# Patient Record
Sex: FEMALE | Race: WHITE | NOT HISPANIC OR LATINO | Employment: FULL TIME | URBAN - METROPOLITAN AREA
[De-identification: names, ages, dates, MRNs, and addresses within clinical notes are randomized per-mention and may not be internally consistent; named-entity substitution may affect disease eponyms.]

---

## 2020-11-03 ASSESSMENT — ENCOUNTER SYMPTOMS
ARTHRALGIAS: 0
PARESTHESIAS: 0
HEARTBURN: 0
CONSTIPATION: 0
JOINT SWELLING: 0
COUGH: 0
SORE THROAT: 0
NAUSEA: 0
SHORTNESS OF BREATH: 0
PALPITATIONS: 0
DIARRHEA: 0
HEMATOCHEZIA: 0
WEAKNESS: 0
BREAST MASS: 0
NERVOUS/ANXIOUS: 1
DYSURIA: 0
HEADACHES: 1
DIZZINESS: 0
HEMATURIA: 0
CHILLS: 0
MYALGIAS: 0
FEVER: 0
ABDOMINAL PAIN: 0
EYE PAIN: 0
FREQUENCY: 0

## 2020-11-03 NOTE — PATIENT INSTRUCTIONS
Labs today  Follow up with dermatology   Call OB-GYN to schedule   Follow up with GI    6 weeks of Whole 30  DGL twice daily  Aloe vera once daily  Add back gluten first and see if you have symptoms  If you do, I would advise strictly staying away from it  If confusing, see GI.  Return to clinic for any new or worsening symptoms or go to ER Urgent care in off hours    Preventive Health Recommendations  Female Ages 21 to 25     Yearly exam:     See your health care provider every year in order to  o Review health changes.   o Discuss preventive care.    o Review your medicines if your doctor has prescribed any.      You should be tested each year for STDs (sexually transmitted diseases).       Talk to your provider about how often you should have cholesterol testing.      Get a Pap test every three years. If you have an abnormal result, your doctor may have you test more often.      If you are at risk for diabetes, you should have a diabetes test (fasting glucose).     Shots:     Get a flu shot each year.     Get a tetanus shot every 10 years.     Consider getting the shot (vaccine) that prevents cervical cancer (Gardasil).    Nutrition:     Eat at least 5 servings of fruits and vegetables each day.    Eat whole-grain bread, whole-wheat pasta and brown rice instead of white grains and rice.    Get adequate Calcium and Vitamin D.     Lifestyle    Exercise at least 150 minutes a week each week (30 minutes a day, 5 days a week). This will help you control your weight and prevent disease.    Limit alcohol to one drink per day.    No smoking.     Wear sunscreen to prevent skin cancer.    See your dentist every six months for an exam and cleaning.

## 2020-11-03 NOTE — PROGRESS NOTES
"   SUBJECTIVE:   CC: Litzy Hatch is an 20 year old woman who presents for preventive health visit.       Patient has been advised of split billing requirements and indicates understanding: Yes  Healthy Habits:     Getting at least 3 servings of Calcium per day:  Yes    Bi-annual eye exam:  Yes    Dental care twice a year:  Yes    Sleep apnea or symptoms of sleep apnea:  None    Diet:  Gluten-free/reduced    Frequency of exercise:  2-3 days/week    Duration of exercise:  15-30 minutes    Taking medications regularly:  Yes    Medication side effects:  None    PHQ-2 Total Score: 0    Additional concerns today:  Yes    Needs referral to derm for acne  Currently on tretinoin and clindamycin cream    Also wonders about if she has a septate hymen   When she got her period, she realized she had 2 holes    Wondering about gluten intolerance  All of her siblings have had allergy testing lately, and they have come back with some \"Weird\" allergies  She lived in [place] 6 months and she had some difficulty adjusting back to medications. She eventually was able to get back on dairy but gluten has been causing rashes.   Has had some unintentional weight loss up to 10 pounds intermittently    Taking a high dose iron supplement, ferix 120 every other day      Today's PHQ-2 Score:   PHQ-2 ( 1999 Pfizer) 11/4/2020   Q1: Little interest or pleasure in doing things 0   Q2: Feeling down, depressed or hopeless 0   PHQ-2 Score 0   Q1: Little interest or pleasure in doing things -   Q2: Feeling down, depressed or hopeless -   PHQ-2 Score -       Abuse: Current or Past (Physical, Sexual or Emotional) - No  Do you feel safe in your environment? Yes        Social History     Tobacco Use     Smoking status: Never Smoker     Smokeless tobacco: Never Used   Substance Use Topics     Alcohol use: Yes     Comment: occassionally      If you drink alcohol do you typically have >3 drinks per day or >7 drinks per week? No    No " flowsheet data found.    Reviewed orders with patient.  Reviewed health maintenance and updated orders accordingly - Yes  Lab work is in process  Labs reviewed in EPIC  BP Readings from Last 3 Encounters:   11/04/20 116/58    Wt Readings from Last 3 Encounters:   11/04/20 60.1 kg (132 lb 8 oz)                  There is no problem list on file for this patient.    History reviewed. No pertinent surgical history.    Social History     Tobacco Use     Smoking status: Never Smoker     Smokeless tobacco: Never Used   Substance Use Topics     Alcohol use: Yes     Comment: occassionally      History reviewed. No pertinent family history.      No current outpatient medications on file.       Mammogram not appropriate for this patient based on age.    Pertinent mammograms are reviewed under the imaging tab.  History of abnormal Pap smear: NO - age 21-29 PAP every 3 years recommended     Reviewed and updated as needed this visit by clinical staff  Tobacco  Allergies  Meds   Med Hx  Surg Hx  Fam Hx  Soc Hx        Reviewed and updated as needed this visit by Provider                    Review of Systems   Constitutional: Negative for chills and fever.   HENT: Negative for congestion, ear pain, hearing loss and sore throat.    Eyes: Negative for pain and visual disturbance.   Respiratory: Negative for cough and shortness of breath.    Cardiovascular: Negative for chest pain, palpitations and peripheral edema.   Gastrointestinal: Negative for abdominal pain, constipation, diarrhea, heartburn, hematochezia and nausea.   Breasts:  Negative for tenderness, breast mass and discharge.   Genitourinary: Negative for dysuria, frequency, genital sores, hematuria, pelvic pain, urgency, vaginal bleeding and vaginal discharge.   Musculoskeletal: Negative for arthralgias, joint swelling and myalgias.   Skin: Negative for rash.   Neurological: Positive for headaches. Negative for dizziness, weakness and paresthesias.  "  Psychiatric/Behavioral: Negative for mood changes. The patient is nervous/anxious.           OBJECTIVE:   /58   Pulse 100   Temp 98.1  F (36.7  C) (Temporal)   Ht 1.765 m (5' 9.49\")   Wt 60.1 kg (132 lb 8 oz)   LMP 10/23/2020 (Exact Date)   SpO2 99%   BMI 19.29 kg/m    Physical Exam  GENERAL: healthy, alert and no distress  EYES: Eyes grossly normal to inspection, PERRL and conjunctivae and sclerae normal  HENT: ear canals and TM's normal, nose and mouth without ulcers or lesions  NECK: no adenopathy, no asymmetry, masses, or scars and thyroid normal to palpation  RESP: lungs clear to auscultation - no rales, rhonchi or wheezes  BREAST: normal without masses, tenderness or nipple discharge and no palpable axillary masses or adenopathy  CV: regular rate and rhythm, normal S1 S2, no S3 or S4, no murmur, click or rub, no peripheral edema and peripheral pulses strong  ABDOMEN: soft, nontender, no hepatosplenomegaly, no masses and bowel sounds normal   (female): an approximately 1 cm x 0.5 cm fibrous tissue at entrance of introitus to the left. otherwise normal female external genitalia, normal urethral meatus, vaginal mucosa pink, moist, well rugated, and normal cervix/adnexa/uterus without masses or discharge  MS: no gross musculoskeletal defects noted, no edema  SKIN: no suspicious lesions or rashes  NEURO: Normal strength and tone, mentation intact and speech normal  PSYCH: mentation appears normal, affect normal/bright    Diagnostic Test Results:  Labs reviewed in Epic    ASSESSMENT/PLAN:       ICD-10-CM    1. Routine general medical examination at a health care facility  Z00.00    2. Acne, unspecified acne type  L70.9 DERMATOLOGY ADULT REFERRAL   3. Screening for malignant neoplasm of cervix  Z12.4 PAP imaged thin layer screen only - recommended age 21 - 24 years   4. Screen for STD (sexually transmitted disease)  Z11.3    5. Abdominal pain, generalized  R10.84 GASTROENTEROLOGY ADULT REF CONSULT " ONLY     TSH with free T4 reflex     Tissue transglutaminase fermín IgA and IgG     Iron and iron binding capacity     CBC with platelets and differential   6. 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  Return to clinic for any new or worsening symptoms or go to ER Urgent care in off hours    Preventive Health Recommendations  Female Ages 21 to 25     Yearly exam:     See your health care provider every year in order to  o Review health changes.   o Discuss preventive care.    o Review your medicines if your doctor has prescribed any.      You should be tested each year for STDs (sexually transmitted diseases).       Talk to your provider about how often you should have cholesterol testing.      Get a Pap test every three years. If you have an abnormal result, your doctor may have you test more often.      If you are at risk for diabetes, you should have a diabetes test (fasting glucose).     Shots:     Get a flu shot each year.     Get a tetanus shot every 10 years.     Consider getting the shot (vaccine) that prevents cervical cancer (Gardasil).    Nutrition:     Eat at least 5 servings of fruits and vegetables each day.    Eat whole-grain bread, whole-wheat pasta and brown rice instead of white grains and rice.    Get adequate Calcium and Vitamin D.     Lifestyle    Exercise at least 150 minutes a week each week (30 minutes a day, 5 days a week). This will help you control your weight and prevent disease.    Limit alcohol to one drink per day.    No smoking.     Wear sunscreen to prevent skin cancer.    See your dentist every six months for an exam and  "cleaning.        Patient has been advised of split billing requirements and indicates understanding: Yes  COUNSELING:  Reviewed preventive health counseling, as reflected in patient instructions    Estimated body mass index is 19.29 kg/m  as calculated from the following:    Height as of this encounter: 1.765 m (5' 9.49\").    Weight as of this encounter: 60.1 kg (132 lb 8 oz).        She reports that she has never smoked. She has never used smokeless tobacco.      Counseling Resources:  ATP IV Guidelines  Pooled Cohorts Equation Calculator  Breast Cancer Risk Calculator  BRCA-Related Cancer Risk Assessment: FHS-7 Tool  FRAX Risk Assessment  ICSI Preventive Guidelines  Dietary Guidelines for Americans, 2010  USDA's MyPlate  ASA Prophylaxis  Lung CA Screening    Evelina Thacker PA-C  [facility]  "

## 2020-11-04 ENCOUNTER — OFFICE VISIT (OUTPATIENT)
Dept: FAMILY MEDICINE | Facility: CLINIC | Age: 20
End: 2020-11-04
Payer: COMMERCIAL

## 2020-11-04 VITALS
WEIGHT: 132.5 LBS | OXYGEN SATURATION: 99 % | TEMPERATURE: 98.1 F | BODY MASS INDEX: 19.62 KG/M2 | HEIGHT: 69 IN | SYSTOLIC BLOOD PRESSURE: 116 MMHG | DIASTOLIC BLOOD PRESSURE: 58 MMHG | HEART RATE: 100 BPM

## 2020-11-04 DIAGNOSIS — Z00.00 ROUTINE GENERAL MEDICAL EXAMINATION AT A HEALTH CARE FACILITY: Primary | ICD-10-CM

## 2020-11-04 DIAGNOSIS — L50.9 URTICARIA: ICD-10-CM

## 2020-11-04 DIAGNOSIS — R79.89 ELEVATED TSH: ICD-10-CM

## 2020-11-04 DIAGNOSIS — L70.9 ACNE, UNSPECIFIED ACNE TYPE: ICD-10-CM

## 2020-11-04 DIAGNOSIS — Q52.4 SEPTATE HYMEN: ICD-10-CM

## 2020-11-04 DIAGNOSIS — R10.84 ABDOMINAL PAIN, GENERALIZED: ICD-10-CM

## 2020-11-04 DIAGNOSIS — Z12.4 SCREENING FOR MALIGNANT NEOPLASM OF CERVIX: ICD-10-CM

## 2020-11-04 DIAGNOSIS — Z11.3 SCREEN FOR STD (SEXUALLY TRANSMITTED DISEASE): ICD-10-CM

## 2020-11-04 LAB
BASOPHILS # BLD AUTO: 0 10E9/L (ref 0–0.2)
BASOPHILS NFR BLD AUTO: 0.6 %
DIFFERENTIAL METHOD BLD: NORMAL
EOSINOPHIL # BLD AUTO: 0.1 10E9/L (ref 0–0.7)
EOSINOPHIL NFR BLD AUTO: 2.6 %
ERYTHROCYTE [DISTWIDTH] IN BLOOD BY AUTOMATED COUNT: 13.3 % (ref 10–15)
HCT VFR BLD AUTO: 38.8 % (ref 35–47)
HGB BLD-MCNC: 12.7 G/DL (ref 11.7–15.7)
IRON SATN MFR SERPL: 30 % (ref 15–46)
IRON SERPL-MCNC: 99 UG/DL (ref 35–180)
LYMPHOCYTES # BLD AUTO: 2 10E9/L (ref 0.8–5.3)
LYMPHOCYTES NFR BLD AUTO: 43.3 %
MCH RBC QN AUTO: 29.6 PG (ref 26.5–33)
MCHC RBC AUTO-ENTMCNC: 32.7 G/DL (ref 31.5–36.5)
MCV RBC AUTO: 90 FL (ref 78–100)
MONOCYTES # BLD AUTO: 0.4 10E9/L (ref 0–1.3)
MONOCYTES NFR BLD AUTO: 8.8 %
NEUTROPHILS # BLD AUTO: 2.1 10E9/L (ref 1.6–8.3)
NEUTROPHILS NFR BLD AUTO: 44.7 %
PLATELET # BLD AUTO: 340 10E9/L (ref 150–450)
RBC # BLD AUTO: 4.29 10E12/L (ref 3.8–5.2)
TIBC SERPL-MCNC: 329 UG/DL (ref 240–430)
WBC # BLD AUTO: 4.7 10E9/L (ref 4–11)

## 2020-11-04 PROCEDURE — 83540 ASSAY OF IRON: CPT | Performed by: PHYSICIAN ASSISTANT

## 2020-11-04 PROCEDURE — 84443 ASSAY THYROID STIM HORMONE: CPT | Performed by: PHYSICIAN ASSISTANT

## 2020-11-04 PROCEDURE — G0145 SCR C/V CYTO,THINLAYER,RESCR: HCPCS | Performed by: PHYSICIAN ASSISTANT

## 2020-11-04 PROCEDURE — 84439 ASSAY OF FREE THYROXINE: CPT | Performed by: PHYSICIAN ASSISTANT

## 2020-11-04 PROCEDURE — 85025 COMPLETE CBC W/AUTO DIFF WBC: CPT | Performed by: PHYSICIAN ASSISTANT

## 2020-11-04 PROCEDURE — 36415 COLL VENOUS BLD VENIPUNCTURE: CPT | Performed by: PHYSICIAN ASSISTANT

## 2020-11-04 PROCEDURE — 83516 IMMUNOASSAY NONANTIBODY: CPT | Performed by: PHYSICIAN ASSISTANT

## 2020-11-04 PROCEDURE — 99385 PREV VISIT NEW AGE 18-39: CPT | Performed by: PHYSICIAN ASSISTANT

## 2020-11-04 PROCEDURE — 99214 OFFICE O/P EST MOD 30 MIN: CPT | Mod: 25 | Performed by: PHYSICIAN ASSISTANT

## 2020-11-04 PROCEDURE — 83550 IRON BINDING TEST: CPT | Performed by: PHYSICIAN ASSISTANT

## 2020-11-04 ASSESSMENT — ENCOUNTER SYMPTOMS
FEVER: 0
DIZZINESS: 0
FREQUENCY: 0
PARESTHESIAS: 0
SHORTNESS OF BREATH: 0
ARTHRALGIAS: 0
EYE PAIN: 0
ABDOMINAL PAIN: 0
MYALGIAS: 0
PALPITATIONS: 0
CONSTIPATION: 0
HEADACHES: 1
JOINT SWELLING: 0
HEARTBURN: 0
CHILLS: 0
NERVOUS/ANXIOUS: 1
COUGH: 0
SORE THROAT: 0
HEMATOCHEZIA: 0
WEAKNESS: 0
NAUSEA: 0
DYSURIA: 0
HEMATURIA: 0
BREAST MASS: 0
DIARRHEA: 0

## 2020-11-04 ASSESSMENT — MIFFLIN-ST. JEOR: SCORE: 1433.14

## 2020-11-05 LAB
T4 FREE SERPL-MCNC: 0.94 NG/DL (ref 0.76–1.46)
TSH SERPL DL<=0.005 MIU/L-ACNC: 5.4 MU/L (ref 0.4–4)
TTG IGA SER-ACNC: <1 U/ML
TTG IGG SER-ACNC: <1 U/ML

## 2020-11-05 NOTE — TELEPHONE ENCOUNTER
REFERRAL INFORMATION:    Referring Provider:  Dr. Thacker    Referring Clinic:  [facility]     Reason for Visit/Diagnosis: Abdominal Pain      FUTURE VISIT INFORMATION:    Appointment Date: 12/9/2020    Appointment Time: 2 PM      NOTES STATUS DETAILS   OFFICE NOTE from Referring Provider Internal 11/4/2020 Office visit with Dr. Thacker    OFFICE NOTE from Other Specialist N/A    HOSPITAL DISCHARGE SUMMARY/  ED VISITS N/A    OPERATIVE REPORT N/A    MEDICATION LIST N/A         ENDOSCOPY  N/A    COLONOSCOPY N/A    ERCP N/A    EUS N/A    STOOL TESTING N/A    PERTINENT LABS Internal     PATHOLOGY REPORTS (RELATED) N/A    IMAGING (CT, MRI, EGD, MRCP, Small Bowel Follow Through/SBT, MR/CT Enterography) N/A

## 2020-11-10 LAB
COPATH REPORT: NORMAL
PAP: NORMAL

## 2020-11-27 ENCOUNTER — MYC MEDICAL ADVICE (OUTPATIENT)
Dept: FAMILY MEDICINE | Facility: CLINIC | Age: 20
End: 2020-11-27

## 2020-11-27 DIAGNOSIS — F41.9 ANXIETY: Primary | ICD-10-CM

## 2020-11-27 NOTE — TELEPHONE ENCOUNTER
Beverly,    See patients my chart message. Please advise    Pharm cued. zofran cued for 8 mg #30 with 1 refill  Ativan cued for 1 mg #15 with 1 refill  Thanks  Viola Cornell RN   [facility]

## 2020-12-01 RX ORDER — LORAZEPAM 1 MG/1
1 TABLET ORAL EVERY 6 HOURS PRN
Qty: 10 TABLET | Refills: 0 | Status: SHIPPED | OUTPATIENT
Start: 2020-12-01 | End: 2021-06-01

## 2020-12-01 RX ORDER — ONDANSETRON 8 MG/1
8 TABLET, FILM COATED ORAL EVERY 8 HOURS PRN
Qty: 10 TABLET | Refills: 0 | Status: SHIPPED | OUTPATIENT
Start: 2020-12-01 | End: 2021-06-01

## 2020-12-09 ENCOUNTER — VIRTUAL VISIT (OUTPATIENT)
Dept: GASTROENTEROLOGY | Facility: CLINIC | Age: 20
End: 2020-12-09
Payer: COMMERCIAL

## 2020-12-09 ENCOUNTER — PRE VISIT (OUTPATIENT)
Dept: GASTROENTEROLOGY | Facility: CLINIC | Age: 20
End: 2020-12-09

## 2020-12-09 VITALS — WEIGHT: 135 LBS | HEIGHT: 69 IN | BODY MASS INDEX: 19.99 KG/M2

## 2020-12-09 DIAGNOSIS — R10.84 ABDOMINAL PAIN, GENERALIZED: ICD-10-CM

## 2020-12-09 DIAGNOSIS — R11.0 NAUSEA: Primary | ICD-10-CM

## 2020-12-09 DIAGNOSIS — L50.9 URTICARIA: ICD-10-CM

## 2020-12-09 PROCEDURE — 99204 OFFICE O/P NEW MOD 45 MIN: CPT | Mod: 95 | Performed by: INTERNAL MEDICINE

## 2020-12-09 ASSESSMENT — MIFFLIN-ST. JEOR: SCORE: 1444.51

## 2020-12-09 NOTE — LETTER
"  12/9/2020       RE: Litzy Hatch  [address]  [place] [address]      Dear Colleague,    Thank you for referring your patient, Litzy Hatch, to the [facility] [place]. Please see a copy of my visit note below.    Litzy Hatch is a 20 year old female who is being evaluated via a billable video visit.      The patient has been notified of following:     \"This video visit will be conducted via a call between you and your physician/provider. We have found that certain health care needs can be provided without the need for an in-person physical exam.  This service lets us provide the care you need with a video conversation.  If a prescription is necessary we can send it directly to your pharmacy.  If lab work is needed we can place an order for that and you can then stop by our lab to have the test done at a later time.    Video visits are billed at different rates depending on your insurance coverage.  Please reach out to your insurance provider with any questions.    If during the course of the call the physician/provider feels a video visit is not appropriate, you will not be charged for this service.\"    Patient has given verbal consent for Video visit? Yes  How would you like to obtain your AVS? MyChart  If you are dropped from the video visit, the video invite should be resent to: Text to cell phone: [phone]  Will anyone else be joining your video visit? No      Video-Visit Details    Type of service:  Video Visit    Video Start Time: 2:03  Video End Time: 2:49    Originating Location (pt. Location): Home    Distant Location (provider location):  [facility] [place]     Platform used for Video Visit: Connectipity      GASTROENTEROLOGY NEW PATIENT VIDEO VISIT    CC/REFERRING MD:    No primary care provider on file.  Evelina Rodriguez    REASON FOR CONSULTATION:   Evelina Rodriguez for   Chief Complaint "   Patient presents with     New Patient     new consult abdominal pain       HISTORY OF PRESENT ILLNESS:    Litzy Hatch is a 20 year old female who is being evaluated via a billable video visit.       Feels she has gluten intolerance vs systemic inflammation.  Has break outs on the backs of her hands.  Feels she has had GI inflammation 3-4 years.  Sometimes gets sharp pains before a BM.  Since age 13.  Worse around time of menses.  Gets IBS related to anxiety.    No blood in stool.  Used to take Fe supps.  States Hb used to be 9 prior to Fe supps.  Main symptom: nausea when she eats.  Depressed appetite.  Has vomited, felt it was anxiety related.    ++bloating 6 months ago and BMs q 3-4 days.   Now has a BM qdaily or every other day.  Not hard, not diarrhea.  Now schedules no more than 3 shifts in a row to help with this.    Meds/  Has been on Fe x 3 yrs.    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  Got  8/2020    Fam hx/  No colon polyps, no CRC  No IBD  No celiac  +hypothyroidism runs in her family    I have reviewed and updated the patient's Past Medical History, Social History, Family History and Medication List.    PERTINENT PAST MEDICAL HISTORY:  (I personally reviewed this history with the patient at today's visit)   No past medical history on file.      PREVIOUS SURGERIES: (I personally reviewed this history with the patient at today's visit)   No past surgical history on file.    ALLERGIES:   No Known Allergies    PERTINENT MEDICATIONS:    Current Outpatient Medications:      LORazepam (ATIVAN) 1 MG tablet, Take 1 tablet (1 mg) by mouth every 6 hours as needed for anxiety, Disp: 10 tablet, Rfl: 0     ondansetron (ZOFRAN) 8 MG tablet, Take 1 tablet (8 mg) by mouth every 8 hours as needed for nausea, Disp: 10 tablet, Rfl: 0     levothyroxine  (SYNTHROID/LEVOTHROID) 50 MCG tablet, Take 1 tablet (50 mcg) by mouth daily, Disp: 30 tablet, Rfl: 1    SOCIAL HISTORY:  Social History     Socioeconomic History     Marital status:      Spouse name: Not on file     Number of children: Not on file     Years of education: Not on file     Highest education level: Not on file   Occupational History     Not on file   Social Needs     Financial resource strain: Not on file     Food insecurity     Worry: Not on file     Inability: Not on file     Transportation needs     Medical: Not on file     Non-medical: Not on file   Tobacco Use     Smoking status: Never Smoker     Smokeless tobacco: Never Used   Substance and Sexual Activity     Alcohol use: Yes     Frequency: Monthly or less     Drinks per session: 1 or 2     Binge frequency: Never     Comment: occassionally      Drug use: Never     Sexual activity: Yes     Partners: Male   Lifestyle     Physical activity     Days per week: Not on file     Minutes per session: Not on file     Stress: Not on file   Relationships     Social connections     Talks on phone: Not on file     Gets together: Not on file     Attends Holiness service: Not on file     Active member of club or organization: Not on file     Attends meetings of clubs or organizations: Not on file     Relationship status: Not on file     Intimate partner violence     Fear of current or ex partner: Not on file     Emotionally abused: Not on file     Physically abused: Not on file     Forced sexual activity: Not on file   Other Topics Concern     Not on file   Social History Narrative     Not on file       FAMILY HISTORY:   Family History   Problem Relation Age of Onset     Diabetes Maternal Grandmother      Hypoparathyroidism Maternal Grandmother      Diabetes Maternal Grandfather      Hypoparathyroidism Maternal Grandfather      Breast Cancer Paternal Grandmother      Breast Cancer Maternal Great-Grandmother         Review of Systems  Per HPI    Exam:     General appearance:  Healthy appearing adult, in no acute distress  Ears, nose, mouth and throat: Hearing intact  Neck:  Symmetric  Respiratory:  Normal respiration, no use of accessory muscles   Psychiatric:  Oriented to person, place and time, Appropriate mood and affect.   Neurologic:  Peripheral muscle function and dexterity appear to be intact    PERTINENT STUDIES have been reviewed.  Labs  11/4 negative celiac serologies. Note the patient had been gluten free x 1 year.  TSH elevated - there is a plan to recheck    11/4 PCP appt: referred to derm for cystic acne around ears.  Recommended whole30, DGL, aloe vera.    Impression/Recommendations:  Litzy Hatch is a 20 year old female who presents for evaluation of nausea, abdominal pains, bloating. No alarm symptoms.  ddx includes PUD vs celiac disease vs SIBO vs IBS vs other.   She has had a number of major life events over the last year.   -- she is concerned regarding systemic inflammation.  Check ESR.  -- hx anemia: check iron studies.  TSH is to be rechecked per patient  -- gluten challenge then EGD, repeat celiac serologies  -- consider PPI or H2B  -- breath test  RTC after above    Luh John MD    Thank you for this consultation.  It was a pleasure to participate in the care of this patient; please contact us with any further questions.      Time spent in appointment today was 46 minutes.

## 2020-12-09 NOTE — PROGRESS NOTES
"Litzy Hatch is a 20 year old female who is being evaluated via a billable video visit.      The patient has been notified of following:     \"This video visit will be conducted via a call between you and your physician/provider. We have found that certain health care needs can be provided without the need for an in-person physical exam.  This service lets us provide the care you need with a video conversation.  If a prescription is necessary we can send it directly to your pharmacy.  If lab work is needed we can place an order for that and you can then stop by our lab to have the test done at a later time.    Video visits are billed at different rates depending on your insurance coverage.  Please reach out to your insurance provider with any questions.    If during the course of the call the physician/provider feels a video visit is not appropriate, you will not be charged for this service.\"    Patient has given verbal consent for Video visit? Yes  How would you like to obtain your AVS? MyChart  If you are dropped from the video visit, the video invite should be resent to: Text to cell phone: [phone]  Will anyone else be joining your video visit? No      Video-Visit Details    Type of service:  Video Visit    Video Start Time: 2:03  Video End Time: 2:49    Originating Location (pt. Location): Home    Distant Location (provider location):  [facility] [place]     Platform used for Video Visit: [facility]      GASTROENTEROLOGY NEW PATIENT VIDEO VISIT    CC/REFERRING MD:    No primary care provider on file.  Evelina Rodriguez    REASON FOR CONSULTATION:   Evelina Rodriguez for   Chief Complaint   Patient presents with     New Patient     new consult abdominal pain       HISTORY OF PRESENT ILLNESS:    Litzy Hatch is a 20 year old female who is being evaluated via a billable video visit.       Feels she has gluten intolerance vs systemic inflammation.  Has break " outs on the backs of her hands.  Feels she has had GI inflammation 3-4 years.  Sometimes gets sharp pains before a BM.  Since age 13.  Worse around time of menses.  Gets IBS related to anxiety.    No blood in stool.  Used to take Fe supps.  States Hb used to be 9 prior to Fe supps.  Main symptom: nausea when she eats.  Depressed appetite.  Has vomited, felt it was anxiety related.    ++bloating 6 months ago and BMs q 3-4 days.   Now has a BM qdaily or every other day.  Not hard, not diarrhea.  Now schedules no more than 3 shifts in a row to help with this.    Meds/  Has been on Fe x 3 yrs.    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  Got  8/2020    Fam hx/  No colon polyps, no CRC  No IBD  No celiac  +hypothyroidism runs in her family      I have reviewed and updated the patient's Past Medical History, Social History, Family History and Medication List.    PERTINENT PAST MEDICAL HISTORY:  (I personally reviewed this history with the patient at today's visit)   No past medical history on file.      PREVIOUS SURGERIES: (I personally reviewed this history with the patient at today's visit)   No past surgical history on file.    ALLERGIES:   No Known Allergies    PERTINENT MEDICATIONS:    Current Outpatient Medications:      LORazepam (ATIVAN) 1 MG tablet, Take 1 tablet (1 mg) by mouth every 6 hours as needed for anxiety, Disp: 10 tablet, Rfl: 0     ondansetron (ZOFRAN) 8 MG tablet, Take 1 tablet (8 mg) by mouth every 8 hours as needed for nausea, Disp: 10 tablet, Rfl: 0     levothyroxine (SYNTHROID/LEVOTHROID) 50 MCG tablet, Take 1 tablet (50 mcg) by mouth daily, Disp: 30 tablet, Rfl: 1    SOCIAL HISTORY:  Social History     Socioeconomic History     Marital status:      Spouse name: Not on file     Number of children: Not on file     Years of education: Not on file      Highest education level: Not on file   Occupational History     Not on file   Social Needs     Financial resource strain: Not on file     Food insecurity     Worry: Not on file     Inability: Not on file     Transportation needs     Medical: Not on file     Non-medical: Not on file   Tobacco Use     Smoking status: Never Smoker     Smokeless tobacco: Never Used   Substance and Sexual Activity     Alcohol use: Yes     Frequency: Monthly or less     Drinks per session: 1 or 2     Binge frequency: Never     Comment: occassionally      Drug use: Never     Sexual activity: Yes     Partners: Male   Lifestyle     Physical activity     Days per week: Not on file     Minutes per session: Not on file     Stress: Not on file   Relationships     Social connections     Talks on phone: Not on file     Gets together: Not on file     Attends Latter day service: Not on file     Active member of club or organization: Not on file     Attends meetings of clubs or organizations: Not on file     Relationship status: Not on file     Intimate partner violence     Fear of current or ex partner: Not on file     Emotionally abused: Not on file     Physically abused: Not on file     Forced sexual activity: Not on file   Other Topics Concern     Not on file   Social History Narrative     Not on file       FAMILY HISTORY:   Family History   Problem Relation Age of Onset     Diabetes Maternal Grandmother      Hypoparathyroidism Maternal Grandmother      Diabetes Maternal Grandfather      Hypoparathyroidism Maternal Grandfather      Breast Cancer Paternal Grandmother      Breast Cancer Maternal Great-Grandmother         Review of Systems  Per HPI    Exam:    General appearance:  Healthy appearing adult, in no acute distress  Ears, nose, mouth and throat: Hearing intact  Neck:  Symmetric  Respiratory:  Normal respiration, no use of accessory muscles   Psychiatric:  Oriented to person, place and time, Appropriate mood and affect.   Neurologic:   Peripheral muscle function and dexterity appear to be intact    PERTINENT STUDIES have been reviewed.  Labs  11/4 negative celiac serologies. Note the patient had been gluten free x 1 year.  TSH elevated - there is a plan to recheck    11/4 PCP appt: referred to derm for cystic acne around ears.  Recommended whole30, DGL, aloe vera.    Impression/Recommendations:  Litzy Hatch is a 20 year old female who presents for evaluation of nausea, abdominal pains, bloating. No alarm symptoms.  ddx includes PUD vs celiac disease vs SIBO vs IBS vs other.   She has had a number of major life events over the last year.   -- she is concerned regarding systemic inflammation.  Check ESR.  -- hx anemia: check iron studies.  TSH is to be rechecked per patient  -- gluten challenge then EGD, repeat celiac serologies  -- consider PPI or H2B  -- breath test  RTC after above    Luh John MD      Thank you for this consultation.  It was a pleasure to participate in the care of this patient; please contact us with any further questions.      Time spent in appointment today was 46 minutes.

## 2020-12-09 NOTE — NURSING NOTE
"Chief Complaint   Patient presents with     New Patient     new consult abdominal pain       Vitals:    12/09/20 1333   Weight: 61.2 kg (135 lb)   Height: 1.765 m (5' 9.49\")       Body mass index is 19.66 kg/m .    Michelle Gonzalez CMA    "

## 2020-12-11 ENCOUNTER — COMMUNICATION - HEALTHEAST (OUTPATIENT)
Dept: FAMILY MEDICINE | Facility: CLINIC | Age: 20
End: 2020-12-11

## 2020-12-16 ENCOUNTER — OFFICE VISIT (OUTPATIENT)
Dept: OBGYN | Facility: CLINIC | Age: 20
End: 2020-12-16
Payer: COMMERCIAL

## 2020-12-16 VITALS
DIASTOLIC BLOOD PRESSURE: 70 MMHG | SYSTOLIC BLOOD PRESSURE: 100 MMHG | BODY MASS INDEX: 19.99 KG/M2 | HEIGHT: 69 IN | OXYGEN SATURATION: 98 % | WEIGHT: 135 LBS | HEART RATE: 80 BPM

## 2020-12-16 DIAGNOSIS — R11.0 NAUSEA: ICD-10-CM

## 2020-12-16 DIAGNOSIS — R79.89 ELEVATED TSH: ICD-10-CM

## 2020-12-16 DIAGNOSIS — R10.84 ABDOMINAL PAIN, GENERALIZED: ICD-10-CM

## 2020-12-16 DIAGNOSIS — Q52.4 SEPTATE HYMEN: Primary | ICD-10-CM

## 2020-12-16 LAB
CRP SERPL-MCNC: <2.9 MG/L (ref 0–8)
ERYTHROCYTE [SEDIMENTATION RATE] IN BLOOD BY WESTERGREN METHOD: 7 MM/H (ref 0–20)
T4 FREE SERPL-MCNC: 0.91 NG/DL (ref 0.76–1.46)
TSH SERPL DL<=0.005 MIU/L-ACNC: 5.66 MU/L (ref 0.4–4)

## 2020-12-16 PROCEDURE — 84443 ASSAY THYROID STIM HORMONE: CPT | Performed by: PHYSICIAN ASSISTANT

## 2020-12-16 PROCEDURE — 86376 MICROSOMAL ANTIBODY EACH: CPT | Performed by: PHYSICIAN ASSISTANT

## 2020-12-16 PROCEDURE — 85652 RBC SED RATE AUTOMATED: CPT | Performed by: PHYSICIAN ASSISTANT

## 2020-12-16 PROCEDURE — 86140 C-REACTIVE PROTEIN: CPT | Performed by: PHYSICIAN ASSISTANT

## 2020-12-16 PROCEDURE — 36415 COLL VENOUS BLD VENIPUNCTURE: CPT | Performed by: PHYSICIAN ASSISTANT

## 2020-12-16 PROCEDURE — 86800 THYROGLOBULIN ANTIBODY: CPT | Performed by: PHYSICIAN ASSISTANT

## 2020-12-16 PROCEDURE — 99202 OFFICE O/P NEW SF 15 MIN: CPT | Performed by: OBSTETRICS & GYNECOLOGY

## 2020-12-16 PROCEDURE — 84439 ASSAY OF FREE THYROXINE: CPT | Performed by: PHYSICIAN ASSISTANT

## 2020-12-16 SDOH — HEALTH STABILITY: MENTAL HEALTH: HOW OFTEN DO YOU HAVE 6 OR MORE DRINKS ON ONE OCCASION?: NEVER

## 2020-12-16 SDOH — HEALTH STABILITY: MENTAL HEALTH: HOW MANY STANDARD DRINKS CONTAINING ALCOHOL DO YOU HAVE ON A TYPICAL DAY?: 1 OR 2

## 2020-12-16 SDOH — HEALTH STABILITY: MENTAL HEALTH: HOW OFTEN DO YOU HAVE A DRINK CONTAINING ALCOHOL?: MONTHLY OR LESS

## 2020-12-16 ASSESSMENT — MIFFLIN-ST. JEOR: SCORE: 1444.51

## 2020-12-16 NOTE — PROGRESS NOTES
"CC:  ?septate hymen  HPI:  Litzy Hatch is a 20 year old female Patient's last menstrual period was 12/15/2020.  Got her menses in seventh grade and realized at that time that she had 2 openings to the vagina.  Tried to tell her mother this and she did not believe her.  Over time has been able to use tampons but only uses the left side.  Has been  now for 3 months and they are not having any issues with intercourse.    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    Allergies: Patient has no known allergies.    The patient's past medical history, social history and family history is reviewed at our visit and updated in EPIC.    EXAM:  Blood pressure 100/70, pulse 80, height 1.765 m (5' 9.49\"), weight 61.2 kg (135 lb), last menstrual period 12/15/2020, SpO2 98 %.  General - pleasant female in no acute distress.  Abdomen - soft, nontender, nondistended, no hepatosplenomegaly.  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  Would inject lidocaine and tie Vicryl stitch on both ends of thick band removing portion in the middle.  Also discussed since this is not bothering her at the time, could also be removed at time of delivery prior to baby .  She was happy to hear we do not just let it tear.    Will contact me if desires removal.      Deepa Franco MD      "

## 2020-12-17 ENCOUNTER — TELEPHONE (OUTPATIENT)
Dept: GASTROENTEROLOGY | Facility: OUTPATIENT CENTER | Age: 20
End: 2020-12-17

## 2020-12-17 DIAGNOSIS — E03.9 HYPOTHYROIDISM, UNSPECIFIED TYPE: Primary | ICD-10-CM

## 2020-12-17 LAB
THYROGLOB AB SERPL IA-ACNC: <20 IU/ML (ref 0–40)
THYROPEROXIDASE AB SERPL-ACNC: 192 IU/ML

## 2020-12-17 RX ORDER — LEVOTHYROXINE SODIUM 50 UG/1
50 TABLET ORAL DAILY
Qty: 30 TABLET | Refills: 1 | Status: SHIPPED | OUTPATIENT
Start: 2020-12-17 | End: 2021-04-13

## 2020-12-17 NOTE — TELEPHONE ENCOUNTER
Patient is scheduled for EGD with Dr. Luh John    Spoke with: Pt    Date of Procedure: 2/18/21    Location: [facility]    Sedation Type Conscious    Pre-op for Unit J MAC NA    (if yes advise patient they will need a pre-op prior to procedure)      Is patient on blood thinners? -no (If yes- inform patient to follow up with PCP or provider for follow up instructions)     Informed patient they will need an adult  Yes    Informed Patient of COVID Test Requirement scheduled 2/15/21    Preferred Pharmacy for Pre Prescription no    Confirmed Nurse will call to complete assessment no    Additional comments: none

## 2020-12-19 DIAGNOSIS — R14.0 BLOATING: ICD-10-CM

## 2020-12-19 DIAGNOSIS — R11.0 NAUSEA: ICD-10-CM

## 2020-12-19 DIAGNOSIS — R10.84 ABDOMINAL PAIN, GENERALIZED: Primary | ICD-10-CM

## 2020-12-24 NOTE — RESULT ENCOUNTER NOTE
Inflammatory markers are not elevated.  I see you have started thyroid medication.  Hope this message finds you well.  Let us know if you have any questions. TESHA

## 2020-12-29 ENCOUNTER — PATIENT OUTREACH (OUTPATIENT)
Dept: GASTROENTEROLOGY | Facility: CLINIC | Age: 20
End: 2020-12-29

## 2020-12-29 ENCOUNTER — OFFICE VISIT - HEALTHEAST (OUTPATIENT)
Dept: FAMILY MEDICINE | Facility: CLINIC | Age: 20
End: 2020-12-29

## 2020-12-29 ENCOUNTER — COMMUNICATION - HEALTHEAST (OUTPATIENT)
Dept: FAMILY MEDICINE | Facility: CLINIC | Age: 20
End: 2020-12-29

## 2020-12-29 DIAGNOSIS — K92.9 DIGESTIVE DISORDER: ICD-10-CM

## 2020-12-29 DIAGNOSIS — E06.3 HASHIMOTO'S DISEASE: ICD-10-CM

## 2020-12-29 DIAGNOSIS — R14.0 BLOATING: ICD-10-CM

## 2020-12-29 DIAGNOSIS — K63.8219 SMALL INTESTINAL BACTERIAL OVERGROWTH: Primary | ICD-10-CM

## 2020-12-29 NOTE — TELEPHONE ENCOUNTER
Central Prior Authorization Team   [phone]    PA Initiation    Medication:   Insurance Company: [employer] - Phone [phone] Fax [phone]  Pharmacy Filling the Rx: [place], [place] - [address] [place] [address]  Filling Pharmacy Phone: [phone]  Filling Pharmacy Fax: [phone]  Start Date: 12/29/2020

## 2020-12-29 NOTE — PROGRESS NOTES
Prior Authorization Retail Medication Request    Medication/Dose:   rifaximin (XIFAXAN) 200 MG tablet 60 tablet 0 12/29/2020 1/8/2021 --   Sig - Route: Take 2 tablets (400 mg) by mouth 3 times daily for 10 days - Oral   Sent to pharmacy as: rifAXIMin 200 MG Oral Tablet (XIFAXAN)   Class: E-Prescribe       ICD code (if different than what is on RX):    Previously Tried and Failed:    Rationale:      Insurance Name:    Insurance ID:        Pharmacy Information (if different than what is on RX)  Name:    Phone:

## 2021-01-05 ENCOUNTER — TELEPHONE (OUTPATIENT)
Dept: GASTROENTEROLOGY | Facility: CLINIC | Age: 21
End: 2021-01-05

## 2021-01-05 NOTE — TELEPHONE ENCOUNTER
PA Pending. Insurance requesting additional information as noted below. Please provide rational for using 400 mg  Three times daily rather than 550 mg three times daily.  Please provide additional information/documentation why non formulary is preferred.

## 2021-01-05 NOTE — TELEPHONE ENCOUNTER
[facility] Call Center    Phone Message    May a detailed message be left on voicemail: yes     Reason for Call: Other:     Rafaela is calling in asking to speak with someone on Dr John's team to discuss the medication PA request that we sent.   More information is needed.       Action Taken: Message routed to:  [facility] Gastro    Travel Screening: Not Applicable

## 2021-01-07 ENCOUNTER — TELEPHONE (OUTPATIENT)
Dept: GASTROENTEROLOGY | Facility: CLINIC | Age: 21
End: 2021-01-07

## 2021-01-07 NOTE — TELEPHONE ENCOUNTER
[facility] Call Center    Phone Message    May a detailed message be left on voicemail: yes     Reason for Call: Medication Question or concern regarding medication   Prescription Clarification  Name of Medication: rifaximin (XIFAXAN) 200 MG tablet  Prescribing Provider: Dr. John   Pharmacy: [facility]   What on the order needs clarification? Pharmacy is needing the new Rx for 550mg. Please advise. Thank you!          Action Taken: Message routed to:  [facility] GI    Travel Screening: Not Applicable

## 2021-01-07 NOTE — TELEPHONE ENCOUNTER
Prior Authorization Approval    Authorization Effective Date: 1/6/2021  Authorization Expiration Date: 2/5/2021  Medication: XIFAXAN-PA APPROVED  MG   Approved Dose/Quantity:   Reference #:     Insurance Company: [employer] - Phone [phone] Fax [phone]  Expected CoPay:       CoPay Card Available:      Foundation Assistance Needed:    Which Pharmacy is filling the prescription (Not needed for infusion/clinic administered): [facility] [place], [place] - [address]  Pharmacy Notified: Yes  Patient Notified: Yes

## 2021-01-21 ENCOUNTER — RECORDS - HEALTHEAST (OUTPATIENT)
Dept: ADMINISTRATIVE | Facility: OTHER | Age: 21
End: 2021-01-21

## 2021-01-24 ENCOUNTER — RECORDS - HEALTHEAST (OUTPATIENT)
Dept: ADMINISTRATIVE | Facility: OTHER | Age: 21
End: 2021-01-24

## 2021-01-24 DIAGNOSIS — Z11.59 ENCOUNTER FOR SCREENING FOR OTHER VIRAL DISEASES: ICD-10-CM

## 2021-02-05 ENCOUNTER — MYC MEDICAL ADVICE (OUTPATIENT)
Dept: FAMILY MEDICINE | Facility: CLINIC | Age: 21
End: 2021-02-05

## 2021-02-05 ENCOUNTER — COMMUNICATION - HEALTHEAST (OUTPATIENT)
Dept: FAMILY MEDICINE | Facility: CLINIC | Age: 21
End: 2021-02-05

## 2021-02-08 ENCOUNTER — TELEPHONE (OUTPATIENT)
Dept: GASTROENTEROLOGY | Facility: CLINIC | Age: 21
End: 2021-02-08

## 2021-02-08 NOTE — TELEPHONE ENCOUNTER
Patient called to cancel EGD procedure and follow-up with Dr. Luh John.  Will call back to reschedule if/when ready.    Procedure: Upper Endoscopy     Breath Testing    Upper Endoscopy Type: EGD    Upper Endoscopy Sedation: Conscious/Moderate    Upper Endoscopy Reason for Procedure: nausea. eval for PUD vs gastritis vs celiac vs other    Breath Testing: Lactulose for SIBO    HBT Reason for Referral: nausea eval for SIBO    Preferred Location: [facility]    Scheduling Instructions: If you have not heard from the scheduling office within 2 business days, please call [phone].           Associated Diagnoses    Nausea [R11.0]  - Primary

## 2021-02-15 ENCOUNTER — RECORDS - HEALTHEAST (OUTPATIENT)
Dept: ADMINISTRATIVE | Facility: OTHER | Age: 21
End: 2021-02-15

## 2021-02-15 ENCOUNTER — OFFICE VISIT - HEALTHEAST (OUTPATIENT)
Dept: FAMILY MEDICINE | Facility: CLINIC | Age: 21
End: 2021-02-15

## 2021-02-15 DIAGNOSIS — E06.3 HASHIMOTO'S THYROIDITIS: ICD-10-CM

## 2021-02-15 DIAGNOSIS — K63.8219 SMALL INTESTINAL BACTERIAL OVERGROWTH: ICD-10-CM

## 2021-02-16 ENCOUNTER — COMMUNICATION - HEALTHEAST (OUTPATIENT)
Dept: FAMILY MEDICINE | Facility: CLINIC | Age: 21
End: 2021-02-16

## 2021-03-11 ENCOUNTER — VIRTUAL VISIT (OUTPATIENT)
Dept: FAMILY MEDICINE | Facility: CLINIC | Age: 21
End: 2021-03-11
Payer: COMMERCIAL

## 2021-03-11 DIAGNOSIS — K63.8219 SMALL INTESTINAL BACTERIAL OVERGROWTH: Primary | ICD-10-CM

## 2021-03-11 DIAGNOSIS — E06.3 HASHIMOTO'S THYROIDITIS: ICD-10-CM

## 2021-03-11 DIAGNOSIS — N94.3 PMS (PREMENSTRUAL SYNDROME): ICD-10-CM

## 2021-03-11 DIAGNOSIS — L70.0 ACNE VULGARIS: ICD-10-CM

## 2021-03-11 PROCEDURE — 99214 OFFICE O/P EST MOD 30 MIN: CPT | Mod: 95 | Performed by: PHYSICIAN ASSISTANT

## 2021-03-11 NOTE — PROGRESS NOTES
"Litzy is a 20 year old who is being evaluated via a billable video visit.      How would you like to obtain your AVS? MyChart  If the video visit is dropped, the invitation should be resent by: Text to cell phone: see demographic  Will anyone else be joining your video visit? No    Video Start Time: 9:10    Assessment & Plan       ICD-10-CM    1. Small intestinal bacterial overgrowth  K63.89    2. Hashimoto's thyroiditis  E06.3    3. Acne vulgaris  L70.0    4. PMS (premenstrual syndrome)  N94.3               Patient Instructions   Vitex or Chasteberry 40 mg daily for 3 months to help with PMS symptoms and acne  Continue low FODMAP diet for 6 weeks to about 3 months  Get labs done in 2 weeks for thyroid.   Follow up 7-8 weeks to discuss how gut stuff is going.   Stop spironolactone  Return to clinic for any new or worsening symptoms or go to ER Urgent care in off hours        No follow-ups on file.    Evelina Rodriguez PA-C  [facility]    Subjective   Litzy is a 20 year old who presents for the following health issues     HPI           Per last message, \"Hi Beverly,      I just scheduled a follow-up appointment in a couple regarding my thyroid labs and new diagnoses. I have also been meeting with a functional medicine doctor from [place], Dr. Ashley Chin, and she has discovered I also have SIBO. (Thank you for encouraging me to pursue answers to my GI issues when we met back in October!) Anyways, I haven't started the levothyroxine because I have been waiting for other tests to come back from Dr. Chin, but I wanted to touch base with you on these things in person as we begin treatment for Hashimoto's and SIBO. Thanks again!\"    She met with the GI doc and then a functional medicine nutritionist  Breath test confirmed SIBO  Just finished xifaxan. On a low FODMAP diet for the last week.  Has been taking thyroid medication for about a month  Definitely having less gut " Ok, should have lipid, AST, BMP.      Left message for patient to return call.    issues  Anxiety is a lot better  Overall feeling a lot better  Still dealing with hormonal acne  Met with dermatology and didn't really like the possible treatment options. Was given spironolactone               Review of Systems   CONSTITUTIONAL: NEGATIVE for fever, chills, change in weight  ENT/MOUTH: NEGATIVE for ear, mouth and throat problems  RESP: NEGATIVE for significant cough or SOB  CV: NEGATIVE for chest pain, palpitations or peripheral edema  PSYCHIATRIC: NEGATIVE for changes in mood or affect      Objective           Vitals:  No vitals were obtained today due to virtual visit.    Physical Exam   GENERAL: Healthy, alert and no distress  EYES: Eyes grossly normal to inspection.  No discharge or erythema, or obvious scleral/conjunctival abnormalities.  RESP: No audible wheeze, cough, or visible cyanosis.  No visible retractions or increased work of breathing.    SKIN: Visible skin clear. No significant rash, abnormal pigmentation or lesions.  NEURO: Cranial nerves grossly intact.  Mentation and speech appropriate for age.  PSYCH: Mentation appears normal, affect normal/bright, judgement and insight intact, normal speech and appearance well-groomed.    Orders Only on 12/16/2020   Component Date Value Ref Range Status     Thyroid Peroxidase Antibody 12/16/2020 192* <35 IU/mL Final     Thyroglobulin Antibody 12/16/2020 <20  <40 IU/mL Final     TSH 12/16/2020 5.66* 0.40 - 4.00 mU/L Final     CRP Inflammation 12/16/2020 <2.9  0.0 - 8.0 mg/L Final     Sed Rate 12/16/2020 7  0 - 20 mm/h Final     T4 Free 12/16/2020 0.91  0.76 - 1.46 ng/dL Final               Video-Visit Details    Type of service:  Video Visit    Video End Time:9:30 AM    Originating Location (pt. Location): Home    Distant Location (provider location):  [facility]     Platform used for Video Visit: [employer]

## 2021-03-11 NOTE — PATIENT INSTRUCTIONS
Vitex or Chasteberry 40 mg daily for 3 months to help with PMS symptoms and acne  Continue low FODMAP diet for 6 weeks  Get labs done in 2 weeks for thyroid.   Follow up 7-8 weeks to discuss how gut stuff is going.   Stop spironolactone  Return to clinic for any new or worsening symptoms or go to ER Urgent care in off hours

## 2021-03-12 ENCOUNTER — MYC MEDICAL ADVICE (OUTPATIENT)
Dept: FAMILY MEDICINE | Facility: CLINIC | Age: 21
End: 2021-03-12

## 2021-03-12 DIAGNOSIS — E06.3 HASHIMOTO'S THYROIDITIS: Primary | ICD-10-CM

## 2021-03-12 DIAGNOSIS — E55.9 VITAMIN D DEFICIENCY: ICD-10-CM

## 2021-03-12 DIAGNOSIS — E03.9 HYPOTHYROIDISM, UNSPECIFIED TYPE: ICD-10-CM

## 2021-03-15 NOTE — TELEPHONE ENCOUNTER
Beverly,    See patients my chart message. Cued up requested labs. You already ordered TSH with T4 reflex. Please advise    Thanks  Viola Cornell RN   [facility]

## 2021-03-26 ENCOUNTER — COMMUNICATION - HEALTHEAST (OUTPATIENT)
Dept: FAMILY MEDICINE | Facility: CLINIC | Age: 21
End: 2021-03-26

## 2021-03-29 ENCOUNTER — OFFICE VISIT - HEALTHEAST (OUTPATIENT)
Dept: FAMILY MEDICINE | Facility: CLINIC | Age: 21
End: 2021-03-29

## 2021-03-29 ENCOUNTER — COMMUNICATION - HEALTHEAST (OUTPATIENT)
Dept: FAMILY MEDICINE | Facility: CLINIC | Age: 21
End: 2021-03-29

## 2021-03-29 DIAGNOSIS — K92.9 DIGESTIVE DISORDER: ICD-10-CM

## 2021-03-29 DIAGNOSIS — E06.3 HASHIMOTO'S THYROIDITIS: ICD-10-CM

## 2021-03-29 DIAGNOSIS — K63.8219 SMALL INTESTINAL BACTERIAL OVERGROWTH: ICD-10-CM

## 2021-04-02 DIAGNOSIS — E55.9 VITAMIN D DEFICIENCY: ICD-10-CM

## 2021-04-02 DIAGNOSIS — R14.0 BLOATING: ICD-10-CM

## 2021-04-02 DIAGNOSIS — R11.0 NAUSEA: ICD-10-CM

## 2021-04-02 DIAGNOSIS — E06.3 HASHIMOTO'S THYROIDITIS: ICD-10-CM

## 2021-04-02 DIAGNOSIS — R10.84 ABDOMINAL PAIN, GENERALIZED: ICD-10-CM

## 2021-04-02 DIAGNOSIS — E03.9 HYPOTHYROIDISM, UNSPECIFIED TYPE: ICD-10-CM

## 2021-04-02 LAB
T3FREE SERPL-MCNC: 2.5 PG/ML (ref 2.3–4.2)
T4 SERPL-MCNC: 11.5 UG/DL (ref 4.5–13.9)

## 2021-04-02 PROCEDURE — 84480 ASSAY TRIIODOTHYRONINE (T3): CPT | Performed by: PHYSICIAN ASSISTANT

## 2021-04-02 PROCEDURE — 84482 T3 REVERSE: CPT | Mod: 90 | Performed by: PHYSICIAN ASSISTANT

## 2021-04-02 PROCEDURE — 83516 IMMUNOASSAY NONANTIBODY: CPT | Performed by: PHYSICIAN ASSISTANT

## 2021-04-02 PROCEDURE — 84479 ASSAY OF THYROID (T3 OR T4): CPT | Mod: 90 | Performed by: PHYSICIAN ASSISTANT

## 2021-04-02 PROCEDURE — 82784 ASSAY IGA/IGD/IGG/IGM EACH: CPT | Performed by: PHYSICIAN ASSISTANT

## 2021-04-02 PROCEDURE — 86376 MICROSOMAL ANTIBODY EACH: CPT | Performed by: PHYSICIAN ASSISTANT

## 2021-04-02 PROCEDURE — 99000 SPECIMEN HANDLING OFFICE-LAB: CPT | Performed by: PHYSICIAN ASSISTANT

## 2021-04-02 PROCEDURE — 84481 FREE ASSAY (FT-3): CPT | Performed by: PHYSICIAN ASSISTANT

## 2021-04-02 PROCEDURE — 84436 ASSAY OF TOTAL THYROXINE: CPT | Performed by: PHYSICIAN ASSISTANT

## 2021-04-02 PROCEDURE — 82306 VITAMIN D 25 HYDROXY: CPT | Performed by: PHYSICIAN ASSISTANT

## 2021-04-02 PROCEDURE — 36415 COLL VENOUS BLD VENIPUNCTURE: CPT | Performed by: PHYSICIAN ASSISTANT

## 2021-04-03 LAB
DEPRECATED CALCIDIOL+CALCIFEROL SERPL-MC: 78 UG/L (ref 20–75)
T3 SERPL-MCNC: 133 NG/DL (ref 60–181)

## 2021-04-05 LAB — IGA SERPL-MCNC: <2 MG/DL (ref 84–499)

## 2021-04-06 ENCOUNTER — MYC MEDICAL ADVICE (OUTPATIENT)
Dept: FAMILY MEDICINE | Facility: CLINIC | Age: 21
End: 2021-04-06

## 2021-04-06 DIAGNOSIS — E06.3 HASHIMOTO'S THYROIDITIS: Primary | ICD-10-CM

## 2021-04-06 LAB
T3RU NFR SERPL: 31 % (ref 28–41)
THYROPEROXIDASE AB SERPL-ACNC: 134 IU/ML
TTG IGA SER-ACNC: <1 U/ML
TTG IGG SER-ACNC: <1 U/ML

## 2021-04-07 LAB — T3REVERSE SERPL-MCNC: 12 NG/DL (ref 9–27)

## 2021-04-07 NOTE — TELEPHONE ENCOUNTER
Beverly,    See patients my chart message. Looks like only one lab is pending.    Thanks  Viola Cornell RN   [facility]

## 2021-04-12 ENCOUNTER — MYC MEDICAL ADVICE (OUTPATIENT)
Dept: FAMILY MEDICINE | Facility: CLINIC | Age: 21
End: 2021-04-12

## 2021-04-12 DIAGNOSIS — E03.9 HYPOTHYROIDISM, UNSPECIFIED TYPE: ICD-10-CM

## 2021-04-12 DIAGNOSIS — D80.2 IGA DEFICIENCY (H): Primary | ICD-10-CM

## 2021-04-12 NOTE — RESULT ENCOUNTER NOTE
Low IgA.  See AlaMarkat message to patient.  Next step: EGD after gluten challenge, further immunoglobulin testing, refer to Immunology.

## 2021-04-13 NOTE — TELEPHONE ENCOUNTER
Beverly,    See patients my chart message. Endocrinology referral cued. Any changes to thyroid medication? Otherwise current Rx cued for levothyroxine 50 mcg.    Thanks  Viola Cornell RN   [facility]

## 2021-04-14 DIAGNOSIS — E03.9 HYPOTHYROIDISM, UNSPECIFIED TYPE: ICD-10-CM

## 2021-04-14 RX ORDER — LEVOTHYROXINE SODIUM 50 UG/1
TABLET ORAL
Qty: 30 TABLET | Refills: 1 | OUTPATIENT
Start: 2021-04-14

## 2021-04-14 RX ORDER — LEVOTHYROXINE SODIUM 50 UG/1
50 TABLET ORAL DAILY
Qty: 30 TABLET | Refills: 3 | Status: SHIPPED | OUTPATIENT
Start: 2021-04-14 | End: 2021-05-07 | Stop reason: DRUGHIGH

## 2021-04-14 NOTE — TELEPHONE ENCOUNTER
Route to POD    Pt requesting refill and she is at 2 tabs left    Ana Lu RN   [facility]

## 2021-04-14 NOTE — TELEPHONE ENCOUNTER
Refused; duplicate order. Called [facility] pharmacy and spoke to pharmacist, medication available for today.     Gabriela Mcguire RN

## 2021-04-16 NOTE — TELEPHONE ENCOUNTER
APPT NOTES: Mychart Video Visit. Hashimoto's thyroiditis, per pt. Referred by Dr. Rodriguez- referral attached.   APPT DATE: 5.3.21   NOTES (FOR ALL VISITS) STATUS DETAILS   OFFICE NOTES from referring provider internal  Dr. Rodriguez   OFFICE NOTES from other specialist internal  11.4.20 [facility]      ED NOTES na    OPERATIVE REPORT  (thyroid, pituitary, adrenal, parathyroid)  (All op notes related to diagnoses) na    MEDICATION LIST internal     IMAGING      DEXASCAN    (ALL) na    MRI (BRAIN)    (ALL) na    XR (Chest)    (ALL) na    CT (HEAD/NECK/CHEST/ABDOMEN)    (ALL) na    NUCLEAR    (ALL)  na    ULTRASOUND (HEAD/NECK) FNA BIOPSIES    (ALL) These images have pathology reports that need to be collected na    ULTRASOUND (HEAD/NECK)    (ALL) na    LABS     DIABETES: HBGA1C, CREATININE, FASTING LIPIDS, MICROALBUMIN URINE, POTASSIUM, TSH, T4    THYROID: TSH, T4, CBC, THYRODLONULIN, TOTAL T3, FREE T4, CALCITONIN, CEA Internal/ce CBC- 11.4.20   T3- 4.2.21  Thyroid peroxidase antibody 4.2.21  TSH, T4- 12.16.20   Vitamin D Deficiency 4.2.21   Thyroxine total 4.2.21     PATHOLOGY REPORTS WITH CASE NUMBER  *All pathology reports, list case number related to DX  *Just report, no path slides  *Surgical path reports for endocrine organs (ovaries, testes, pancreas, etc)  NA

## 2021-04-19 ENCOUNTER — OFFICE VISIT - HEALTHEAST (OUTPATIENT)
Dept: FAMILY MEDICINE | Facility: CLINIC | Age: 21
End: 2021-04-19

## 2021-04-19 ENCOUNTER — COMMUNICATION - HEALTHEAST (OUTPATIENT)
Dept: FAMILY MEDICINE | Facility: CLINIC | Age: 21
End: 2021-04-19

## 2021-04-19 DIAGNOSIS — E06.3 HASHIMOTO'S THYROIDITIS: ICD-10-CM

## 2021-04-19 DIAGNOSIS — D80.2 IGA DEFICIENCY (H): Primary | ICD-10-CM

## 2021-04-19 DIAGNOSIS — K63.8219 SMALL INTESTINAL BACTERIAL OVERGROWTH: ICD-10-CM

## 2021-04-19 DIAGNOSIS — D80.2 IGA DEFICIENCY (H): ICD-10-CM

## 2021-04-29 RX ORDER — CLINDAMYCIN PHOSPHATE AND BENZOYL PEROXIDE 10; 50 MG/G; MG/G
GEL TOPICAL
COMMUNITY
Start: 2020-03-01 | End: 2021-06-01

## 2021-04-29 RX ORDER — CHOLECALCIFEROL (VITAMIN D3) 10(400)/ML
DROPS ORAL
COMMUNITY
Start: 2018-09-01 | End: 2021-06-21

## 2021-04-29 RX ORDER — TRETINOIN 0.5 MG/G
CREAM TOPICAL
COMMUNITY
Start: 2020-03-01 | End: 2021-07-08

## 2021-04-30 NOTE — PROGRESS NOTES
Litzy is a 21 year old who is being evaluated via a billable video visit.      How would you like to obtain your AVS? MyChart    Will anyone else be joining your video visit? Abbey ARSHAD MA

## 2021-05-03 ENCOUNTER — VIRTUAL VISIT (OUTPATIENT)
Dept: ENDOCRINOLOGY | Facility: CLINIC | Age: 21
End: 2021-05-03
Attending: PHYSICIAN ASSISTANT
Payer: COMMERCIAL

## 2021-05-03 ENCOUNTER — PRE VISIT (OUTPATIENT)
Dept: ENDOCRINOLOGY | Facility: CLINIC | Age: 21
End: 2021-05-03

## 2021-05-03 ENCOUNTER — APPOINTMENT (OUTPATIENT)
Dept: URBAN - METROPOLITAN AREA CLINIC 260 | Age: 21
Setting detail: DERMATOLOGY
End: 2021-05-04

## 2021-05-03 VITALS — WEIGHT: 135 LBS | HEIGHT: 69 IN

## 2021-05-03 DIAGNOSIS — L70.0 ACNE VULGARIS: ICD-10-CM

## 2021-05-03 DIAGNOSIS — E03.8 SUBCLINICAL HYPOTHYROIDISM: Primary | ICD-10-CM

## 2021-05-03 DIAGNOSIS — L30.8 OTHER SPECIFIED DERMATITIS: ICD-10-CM

## 2021-05-03 DIAGNOSIS — R76.8 ANTI-TPO ANTIBODIES PRESENT: ICD-10-CM

## 2021-05-03 PROCEDURE — 99204 OFFICE O/P NEW MOD 45 MIN: CPT | Mod: 95

## 2021-05-03 PROCEDURE — 99203 OFFICE O/P NEW LOW 30 MIN: CPT

## 2021-05-03 PROCEDURE — OTHER COUNSELING: OTHER

## 2021-05-03 PROCEDURE — OTHER EDUCATIONAL RESOURCES PROVIDED: OTHER

## 2021-05-03 PROCEDURE — OTHER ADDITIONAL NOTES: OTHER

## 2021-05-03 PROCEDURE — OTHER PRESCRIPTION MEDICATION MANAGEMENT: OTHER

## 2021-05-03 PROCEDURE — OTHER PRESCRIPTION: OTHER

## 2021-05-03 RX ORDER — AZELAIC ACID 0.15 G/G
15% GEL TOPICAL BID
Qty: 1 | Refills: 5 | Status: ERX | COMMUNITY
Start: 2021-05-03

## 2021-05-03 RX ORDER — TRIAMCINOLONE ACETONIDE 1 MG/G
0.1% CREAM TOPICAL BID
Qty: 1 | Refills: 1 | Status: ERX | COMMUNITY
Start: 2021-05-03

## 2021-05-03 ASSESSMENT — LOCATION DETAILED DESCRIPTION DERM
LOCATION DETAILED: LEFT ULNAR DORSAL HAND
LOCATION DETAILED: RIGHT INFERIOR CENTRAL MALAR CHEEK
LOCATION DETAILED: LEFT INFERIOR CENTRAL MALAR CHEEK
LOCATION DETAILED: RIGHT RADIAL DORSAL HAND

## 2021-05-03 ASSESSMENT — SEVERITY ASSESSMENT: SEVERITY: MILD TO MODERATE

## 2021-05-03 ASSESSMENT — LOCATION SIMPLE DESCRIPTION DERM
LOCATION SIMPLE: RIGHT CHEEK
LOCATION SIMPLE: LEFT HAND
LOCATION SIMPLE: RIGHT HAND
LOCATION SIMPLE: LEFT CHEEK

## 2021-05-03 ASSESSMENT — LOCATION ZONE DERM
LOCATION ZONE: HAND
LOCATION ZONE: FACE

## 2021-05-03 NOTE — PROCEDURE: PRESCRIPTION MEDICATION MANAGEMENT
Plan: If patient is wanting testing completed we would recommend [facility]. Plan: If patient is wanting testing completed we would recommend [facility].

## 2021-05-03 NOTE — PROCEDURE: COUNSELING
Bactrim Counseling:  I discussed with the patient the risks of sulfa antibiotics including but not limited to GI upset, allergic reaction, drug rash, diarrhea, dizziness, photosensitivity, and yeast infections.  Rarely, more serious reactions can occur including but not limited to aplastic anemia, agranulocytosis, methemoglobinemia, blood dyscrasias, liver or kidney failure, lung infiltrates or desquamative/blistering drug rashes.
Sarecycline Counseling: Patient advised regarding possible photosensitivity and discoloration of the teeth, skin, lips, tongue and gums.  Patient instructed to avoid sunlight, if possible.  When exposed to sunlight, patients should wear protective clothing, sunglasses, and sunscreen.  The patient was instructed to call the office immediately if the following severe adverse effects occur:  hearing changes, easy bruising/bleeding, severe headache, or vision changes.  The patient verbalized understanding of the proper use and possible adverse effects of sarecycline.  All of the patient's questions and concerns were addressed.
Isotretinoin Counseling: Patient should get monthly blood tests, not donate blood, not drive at night if vision affected, not share medication, and not undergo elective surgery for 6 months after tx completed. Side effects reviewed, pt to contact office should one occur.
Isotretinoin Pregnancy And Lactation Text: This medication is Pregnancy Category X and is considered extremely dangerous during pregnancy. It is unknown if it is excreted in breast milk.
Azithromycin Pregnancy And Lactation Text: This medication is considered safe during pregnancy and is also secreted in breast milk.
High Dose Vitamin A Counseling: Side effects reviewed, pt to contact office should one occur.
High Dose Vitamin A Pregnancy And Lactation Text: High dose vitamin A therapy is contraindicated during pregnancy and breast feeding.
Birth Control Pills Counseling: Birth Control Pill Counseling: I discussed with the patient the potential side effects of OCPs including but not limited to increased risk of stroke, heart attack, thrombophlebitis, deep venous thrombosis, hepatic adenomas, breast changes, GI upset, headaches, and depression.  The patient verbalized understanding of the proper use and possible adverse effects of OCPs. All of the patient's questions and concerns were addressed.
Dapsone Pregnancy And Lactation Text: This medication is Pregnancy Category C and is not considered safe during pregnancy or breast feeding.
Topical Retinoid counseling:  Patient advised to apply a pea-sized amount only at bedtime and wait 30 minutes after washing their face before applying.  If too drying, patient may add a non-comedogenic moisturizer. The patient verbalized understanding of the proper use and possible adverse effects of retinoids.  All of the patient's questions and concerns were addressed.
Tetracycline Pregnancy And Lactation Text: This medication is Pregnancy Category D and not consider safe during pregnancy. It is also excreted in breast milk.
Benzoyl Peroxide Pregnancy And Lactation Text: This medication is Pregnancy Category C. It is unknown if benzoyl peroxide is excreted in breast milk.
Doxycycline Pregnancy And Lactation Text: This medication is Pregnancy Category D and not consider safe during pregnancy. It is also excreted in breast milk but is considered safe for shorter treatment courses.
Dapsone Counseling: I discussed with the patient the risks of dapsone including but not limited to hemolytic anemia, agranulocytosis, rashes, methemoglobinemia, kidney failure, peripheral neuropathy, headaches, GI upset, and liver toxicity.  Patients who start dapsone require monitoring including baseline LFTs and weekly CBCs for the first month, then every month thereafter.  The patient verbalized understanding of the proper use and possible adverse effects of dapsone.  All of the patient's questions and concerns were addressed.
Topical Clindamycin Counseling: Patient counseled that this medication may cause skin irritation or allergic reactions.  In the event of skin irritation, the patient was advised to reduce the amount of the drug applied or use it less frequently.   The patient verbalized understanding of the proper use and possible adverse effects of clindamycin.  All of the patient's questions and concerns were addressed.
Bactrim Pregnancy And Lactation Text: This medication is Pregnancy Category D and is known to cause fetal risk.  It is also excreted in breast milk.
Include Pregnancy/Lactation Warning?: No
Tetracycline Counseling: Patient counseled regarding possible photosensitivity and increased risk for sunburn.  Patient instructed to avoid sunlight, if possible.  When exposed to sunlight, patients should wear protective clothing, sunglasses, and sunscreen.  The patient was instructed to call the office immediately if the following severe adverse effects occur:  hearing changes, easy bruising/bleeding, severe headache, or vision changes.  The patient verbalized understanding of the proper use and possible adverse effects of tetracycline.  All of the patient's questions and concerns were addressed. Patient understands to avoid pregnancy while on therapy due to potential birth defects.
Detail Level: Zone
Tazorac Counseling:  Patient advised that medication is irritating and drying.  Patient may need to apply sparingly and wash off after an hour before eventually leaving it on overnight.  The patient verbalized understanding of the proper use and possible adverse effects of tazorac.  All of the patient's questions and concerns were addressed.
Spironolactone Pregnancy And Lactation Text: This medication can cause feminization of the male fetus and should be avoided during pregnancy. The active metabolite is also found in breast milk.
Minocycline Counseling: Patient advised regarding possible photosensitivity and discoloration of the teeth, skin, lips, tongue and gums.  Patient instructed to avoid sunlight, if possible.  When exposed to sunlight, patients should wear protective clothing, sunglasses, and sunscreen.  The patient was instructed to call the office immediately if the following severe adverse effects occur:  hearing changes, easy bruising/bleeding, severe headache, or vision changes.  The patient verbalized understanding of the proper use and possible adverse effects of minocycline.  All of the patient's questions and concerns were addressed.
Topical Sulfur Applications Counseling: Topical Sulfur Counseling: Patient counseled that this medication may cause skin irritation or allergic reactions.  In the event of skin irritation, the patient was advised to reduce the amount of the drug applied or use it less frequently.   The patient verbalized understanding of the proper use and possible adverse effects of topical sulfur application.  All of the patient's questions and concerns were addressed.
Spironolactone Counseling: Patient advised regarding risks of diarrhea, abdominal pain, hyperkalemia, birth defects (for female patients), liver toxicity and renal toxicity. The patient may need blood work to monitor liver and kidney function and potassium levels while on therapy. The patient verbalized understanding of the proper use and possible adverse effects of spironolactone.  All of the patient's questions and concerns were addressed.
Topical Retinoid Pregnancy And Lactation Text: This medication is Pregnancy Category C. It is unknown if this medication is excreted in breast milk.
Doxycycline Counseling:  Patient counseled regarding possible photosensitivity and increased risk for sunburn.  Patient instructed to avoid sunlight, if possible.  When exposed to sunlight, patients should wear protective clothing, sunglasses, and sunscreen.  The patient was instructed to call the office immediately if the following severe adverse effects occur:  hearing changes, easy bruising/bleeding, severe headache, or vision changes.  The patient verbalized understanding of the proper use and possible adverse effects of doxycycline.  All of the patient's questions and concerns were addressed.
Erythromycin Pregnancy And Lactation Text: This medication is Pregnancy Category B and is considered safe during pregnancy. It is also excreted in breast milk.
Tazorac Pregnancy And Lactation Text: This medication is not safe during pregnancy. It is unknown if this medication is excreted in breast milk.
Topical Sulfur Applications Pregnancy And Lactation Text: This medication is Pregnancy Category C and has an unknown safety profile during pregnancy. It is unknown if this topical medication is excreted in breast milk.
Birth Control Pills Pregnancy And Lactation Text: This medication should be avoided if pregnant and for the first 30 days post-partum.
Erythromycin Counseling:  I discussed with the patient the risks of erythromycin including but not limited to GI upset, allergic reaction, drug rash, diarrhea, increase in liver enzymes, and yeast infections.
Azithromycin Counseling:  I discussed with the patient the risks of azithromycin including but not limited to GI upset, allergic reaction, drug rash, diarrhea, and yeast infections.
Topical Clindamycin Pregnancy And Lactation Text: This medication is Pregnancy Category B and is considered safe during pregnancy. It is unknown if it is excreted in breast milk.
Benzoyl Peroxide Counseling: Patient counseled that medicine may cause skin irritation and bleach clothing.  In the event of skin irritation, the patient was advised to reduce the amount of the drug applied or use it less frequently.   The patient verbalized understanding of the proper use and possible adverse effects of benzoyl peroxide.  All of the patient's questions and concerns were addressed.

## 2021-05-03 NOTE — PROCEDURE: ADDITIONAL NOTES
Additional Notes: Recommend laser treatments or filler for deeper scar.
Detail Level: Simple
Render Risk Assessment In Note?: no

## 2021-05-03 NOTE — PATIENT INSTRUCTIONS
Repeat thyroid labs.          Let us know immediately if diagnosis of pregnancy    Information for patients on Levo-thyroxine      The thyroid hormone, Levo-thyroxine (L-T4) is one of the main hormones normally produced by the thyroid.  The drug is identical in chemical structure to the natural product.  Levothyroxine is used to treat hypothyroidism (underactive thyroid).  Sometimes it is used even when the thyroid is not underactive, to treat a goiter (enlarged thyroid) or a thyroid nodule.  For patients with a history of thyroid removal, L-T4 is used to replace the deficiency created by the surgery.    The purpose of giving L-T4 to treat hypothyroidism is to make up for the thyroid hormone previously produced by your thyroid before it failed.  Depending on the extent of your thyroid failure, you may require a higher or a lower dose of L-T4.  The goal is to make your blood level of TSH (a hormone made by the master gland, the pituitary, the gland which controls and judges the thyroid) normal.    This drug is usually well-tolerated and safe but it is important to take the proper dose for YOU.  This involves periodic measurements of TSH, usually within 1-2 months of a dose change.  You should be off biotin containing supplements for at least one week prior to thyroid blood tests.    You should alert your doctor if you have signs you are getting too much L-T4.  These include excessive nervousness, heart fluttering or skipping beats, diarrhea, or feeling too hot and/or sweaty.      There are many brand names for Levo-thyroxine (L-T4), including Synthroid, Levoxyl, Levothroid, Unithroid, Tirosint and others.  Changing from one brand name thyroid hormone product to another or to a generic product (all generics are called levothyroxine) can cause changes in your thyroid hormone balance, even if the dose stays the same.  Since generic products can come from many different companies (such as [employer], [employer], [employer] and  others), there may be less consistency among the different generic preparations.  The decision to change brands or to change to a generic product must be made with your physician or other caregiver.  Follow-up testing should be arranged to monitor for any needed adjustments.  Monitoring may need to be more frequent if you always receive a generic thyroid hormone product.    For proper thyroid hormone balance the dose of thyroid hormone in your pills must be precise and consistent.    Be sure to take the medication as prescribed on an empty stomach, and at least 4 hours apart from calcium supplements, iron and soy products.  Store the medication away from severe heat and humidity.    You should be OFF any biotin containing supplements at least 7 days prior to thyroid lab draws.       Many insurance companies and other providers charge higher co-payments for brand name medications.  Since brand name L-T4 is not very expensive, be sure to ask if the actual cost of buying the medication is less than the co-payment.  In some instances the charge for a co-payment may be greater than buying the drug outright, especially if the prescription needs to be refilled every 30 days.
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
## 2021-05-06 ENCOUNTER — TELEPHONE (OUTPATIENT)
Dept: ALLERGY | Facility: CLINIC | Age: 21
End: 2021-05-06
Payer: COMMERCIAL

## 2021-05-06 ENCOUNTER — TELEPHONE (OUTPATIENT)
Dept: INFECTIOUS DISEASES | Facility: CLINIC | Age: 21
End: 2021-05-06

## 2021-05-06 NOTE — TELEPHONE ENCOUNTER
[facility] Call Center    Phone Message    May a detailed message be left on voicemail: yes     Reason for Call: Other: Pt requesting call back to schedule a new pt appt for a DX of IgA deficiency- writer did not see that DX on the protocols. Pt has a referral but it is under an Immunology referral. Pt was instructed to call this clinic to schedule that appt.     Action Taken: Message routed to:  [facility] ID

## 2021-05-06 NOTE — TELEPHONE ENCOUNTER
[facility] Call Center    Phone Message    May a detailed message be left on voicemail: yes     Reason for Call: Appointment Intake    Referring Provider Name:   Luh John MD in [facility]    Diagnosis and/or Symptoms:   IgA deficiency    Action Taken: Message routed to:  [facility]): Allergy    Travel Screening: Not Applicable     Pt is calling regarding her referral for IgA Deficiency. Pt states she called Immunology and that dept said they only work with Pediatric Pts, and directed Pt to Allergy.    Please call Pt to advise if she can be treated for this referral or direct her to different dept for care.    Thank you!

## 2021-05-07 DIAGNOSIS — E03.8 SUBCLINICAL HYPOTHYROIDISM: ICD-10-CM

## 2021-05-07 DIAGNOSIS — D80.2 IGA DEFICIENCY (H): ICD-10-CM

## 2021-05-07 DIAGNOSIS — E03.8 SUBCLINICAL HYPOTHYROIDISM: Primary | ICD-10-CM

## 2021-05-07 LAB
T4 FREE SERPL-MCNC: 1.17 NG/DL (ref 0.76–1.46)
TSH SERPL DL<=0.005 MIU/L-ACNC: 4.77 MU/L (ref 0.4–4)

## 2021-05-07 PROCEDURE — 36415 COLL VENOUS BLD VENIPUNCTURE: CPT

## 2021-05-07 PROCEDURE — 84439 ASSAY OF FREE THYROXINE: CPT

## 2021-05-07 PROCEDURE — 82784 ASSAY IGA/IGD/IGG/IGM EACH: CPT

## 2021-05-07 PROCEDURE — 84443 ASSAY THYROID STIM HORMONE: CPT

## 2021-05-07 RX ORDER — LEVOTHYROXINE SODIUM 75 UG/1
75 TABLET ORAL DAILY
Qty: 90 TABLET | Refills: 4 | Status: SHIPPED | OUTPATIENT
Start: 2021-05-07 | End: 2021-06-01

## 2021-05-10 ENCOUNTER — TELEPHONE (OUTPATIENT)
Dept: DERMATOLOGY | Facility: CLINIC | Age: 21
End: 2021-05-10

## 2021-05-10 NOTE — TELEPHONE ENCOUNTER
Left message on vm per dr. Russell for pt to call [facility] for specialist for her referral     Tila Lock on 5/10/2021 at 10:46 AM

## 2021-05-10 NOTE — TELEPHONE ENCOUNTER
Wes Russell MD  You 3 days ago     Not my specialty. Either hematology or if allergy [place] wants to see it. Dr. Osorio

## 2021-05-11 LAB
IGA SERPL-MCNC: <2 MG/DL (ref 84–499)
IGG SERPL-MCNC: 1568 MG/DL (ref 610–1616)
IGM SERPL-MCNC: 82 MG/DL (ref 35–242)

## 2021-05-12 ENCOUNTER — OFFICE VISIT (OUTPATIENT)
Dept: ALLERGY | Facility: CLINIC | Age: 21
End: 2021-05-12
Payer: COMMERCIAL

## 2021-05-12 VITALS
OXYGEN SATURATION: 96 % | BODY MASS INDEX: 19.9 KG/M2 | SYSTOLIC BLOOD PRESSURE: 98 MMHG | DIASTOLIC BLOOD PRESSURE: 68 MMHG | TEMPERATURE: 98.7 F | HEART RATE: 94 BPM | WEIGHT: 136.69 LBS

## 2021-05-12 DIAGNOSIS — D80.2 IGA DEFICIENCY (H): Primary | ICD-10-CM

## 2021-05-12 LAB — MISCELLANEOUS TEST: NORMAL

## 2021-05-12 PROCEDURE — 86774 TETANUS ANTIBODY: CPT | Performed by: ALLERGY & IMMUNOLOGY

## 2021-05-12 PROCEDURE — 86357 NK CELLS TOTAL COUNT: CPT | Performed by: ALLERGY & IMMUNOLOGY

## 2021-05-12 PROCEDURE — 86317 IMMUNOASSAY INFECTIOUS AGENT: CPT | Mod: 90 | Performed by: ALLERGY & IMMUNOLOGY

## 2021-05-12 PROCEDURE — 86317 IMMUNOASSAY INFECTIOUS AGENT: CPT | Mod: 59 | Performed by: ALLERGY & IMMUNOLOGY

## 2021-05-12 PROCEDURE — 86360 T CELL ABSOLUTE COUNT/RATIO: CPT | Performed by: ALLERGY & IMMUNOLOGY

## 2021-05-12 PROCEDURE — 99204 OFFICE O/P NEW MOD 45 MIN: CPT | Performed by: ALLERGY & IMMUNOLOGY

## 2021-05-12 PROCEDURE — 99000 SPECIMEN HANDLING OFFICE-LAB: CPT | Performed by: ALLERGY & IMMUNOLOGY

## 2021-05-12 PROCEDURE — 83520 IMMUNOASSAY QUANT NOS NONAB: CPT | Performed by: ALLERGY & IMMUNOLOGY

## 2021-05-12 PROCEDURE — 86359 T CELLS TOTAL COUNT: CPT | Performed by: ALLERGY & IMMUNOLOGY

## 2021-05-12 PROCEDURE — 86162 COMPLEMENT TOTAL (CH50): CPT | Performed by: ALLERGY & IMMUNOLOGY

## 2021-05-12 PROCEDURE — 36415 COLL VENOUS BLD VENIPUNCTURE: CPT | Performed by: ALLERGY & IMMUNOLOGY

## 2021-05-12 PROCEDURE — 86355 B CELLS TOTAL COUNT: CPT | Performed by: ALLERGY & IMMUNOLOGY

## 2021-05-12 PROCEDURE — 82787 IGG 1 2 3 OR 4 EACH: CPT | Performed by: ALLERGY & IMMUNOLOGY

## 2021-05-12 PROCEDURE — 82784 ASSAY IGA/IGD/IGG/IGM EACH: CPT | Performed by: ALLERGY & IMMUNOLOGY

## 2021-05-12 RX ORDER — TRIAMCINOLONE ACETONIDE 1 MG/G
CREAM TOPICAL
COMMUNITY
Start: 2021-05-03 | End: 2021-06-21

## 2021-05-12 SDOH — ECONOMIC STABILITY: FOOD INSECURITY: WITHIN THE PAST 12 MONTHS, YOU WORRIED THAT YOUR FOOD WOULD RUN OUT BEFORE YOU GOT MONEY TO BUY MORE.: NOT ASKED

## 2021-05-12 SDOH — ECONOMIC STABILITY: FOOD INSECURITY: WITHIN THE PAST 12 MONTHS, THE FOOD YOU BOUGHT JUST DIDN'T LAST AND YOU DIDN'T HAVE MONEY TO GET MORE.: NOT ASKED

## 2021-05-12 SDOH — ECONOMIC STABILITY: TRANSPORTATION INSECURITY
IN THE PAST 12 MONTHS, HAS THE LACK OF TRANSPORTATION KEPT YOU FROM MEDICAL APPOINTMENTS OR FROM GETTING MEDICATIONS?: NOT ASKED

## 2021-05-12 SDOH — ECONOMIC STABILITY: INCOME INSECURITY: HOW HARD IS IT FOR YOU TO PAY FOR THE VERY BASICS LIKE FOOD, HOUSING, MEDICAL CARE, AND HEATING?: NOT ASKED

## 2021-05-12 SDOH — ECONOMIC STABILITY: TRANSPORTATION INSECURITY
IN THE PAST 12 MONTHS, HAS LACK OF TRANSPORTATION KEPT YOU FROM MEETINGS, WORK, OR FROM GETTING THINGS NEEDED FOR DAILY LIVING?: NOT ASKED

## 2021-05-12 ASSESSMENT — ENCOUNTER SYMPTOMS
EYE DISCHARGE: 0
SHORTNESS OF BREATH: 0
COUGH: 0
NAUSEA: 0
ACTIVITY CHANGE: 0
CHILLS: 0
JOINT SWELLING: 0
HEADACHES: 1
MYALGIAS: 0
EYE REDNESS: 0
CHEST TIGHTNESS: 0
WHEEZING: 0
ARTHRALGIAS: 0
FACIAL SWELLING: 0
ADENOPATHY: 0
EYE ITCHING: 0
DIARRHEA: 0
SINUS PRESSURE: 0
VOMITING: 0
FEVER: 0
RHINORRHEA: 0

## 2021-05-12 NOTE — RESULT ENCOUNTER NOTE
Normal IgG and IgM.  Continued low IgA.  I have discussed the case with Hematology.  See MyChart note to patient for details.  Further labs to follow and plan to refer to Hematology.
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
## 2021-05-13 LAB
C TETANI IGG SER IA-ACNC: NORMAL IU/ML
CD19 CELLS # BLD: 635 CELLS/UL (ref 107–698)
CD19 CELLS NFR BLD: 25 % (ref 6–27)
CD3 CELLS # BLD: 1730 CELLS/UL (ref 603–2990)
CD3 CELLS NFR BLD: 68 % (ref 49–84)
CD3+CD4+ CELLS # BLD: 1240 CELLS/UL (ref 441–2156)
CD3+CD4+ CELLS NFR BLD: 49 % (ref 28–63)
CD3+CD4+ CELLS/CD3+CD8+ CLL BLD: 3.06 % (ref 1.4–2.6)
CD3+CD8+ CELLS # BLD: 410 CELLS/UL (ref 125–1312)
CD3+CD8+ CELLS NFR BLD: 16 % (ref 10–40)
CD3-CD16+CD56+ CELLS # BLD: 145 CELLS/UL (ref 95–640)
CD3-CD16+CD56+ CELLS NFR BLD: 6 % (ref 4–25)
IFC SPECIMEN: ABNORMAL

## 2021-05-14 LAB
RESULT: NORMAL
SEND OUTS MISC TEST CODE: NORMAL
SEND OUTS MISC TEST SPECIMEN: NORMAL
TEST NAME: NORMAL

## 2021-05-15 LAB — CH50 SERPL-ACNC: 57.6 U/ML (ref 38.7–89.9)

## 2021-05-17 LAB
IGG SERPL-MCNC: 1604 MG/DL (ref 610–1616)
IGG1 SER-MCNC: 940 MG/DL (ref 382–929)
IGG2 SER-MCNC: 545 MG/DL (ref 242–700)
IGG3 SER-MCNC: 37 MG/DL (ref 22–176)
IGG4 SER-MCNC: 8 MG/DL (ref 4–86)

## 2021-05-18 LAB
DEPRECATED S PNEUM 1 IGG SER-MCNC: 4.8 MCG/ML
DEPRECATED S PNEUM12 IGG SER-MCNC: <0.4 MCG/ML
DEPRECATED S PNEUM14 IGG SER-MCNC: 0.4 MCG/ML
DEPRECATED S PNEUM17 IGG SER-MCNC: 5 MCG/ML
DEPRECATED S PNEUM19 IGG SER-MCNC: 5.6 MCG/ML
DEPRECATED S PNEUM2 IGG SER-MCNC: <0.4 MCG/ML
DEPRECATED S PNEUM20 IGG SER-MCNC: <0.4 MCG/ML
DEPRECATED S PNEUM22 IGG SER-MCNC: 5.1 MCG/ML
DEPRECATED S PNEUM23 IGG SER-MCNC: 6.7 MCG/ML
DEPRECATED S PNEUM3 IGG SER-MCNC: 21.9 MCG/ML
DEPRECATED S PNEUM34 IGG SER-MCNC: 1.2 MCG/ML
DEPRECATED S PNEUM4 IGG SER-MCNC: 1.2 MCG/ML
DEPRECATED S PNEUM43 IGG SER-MCNC: 0.6 MCG/ML
DEPRECATED S PNEUM5 IGG SER-MCNC: 1.3 MCG/ML
DEPRECATED S PNEUM8 IGG SER-MCNC: 4.3 MCG/ML
DEPRECATED S PNEUM9 IGG SER-MCNC: NORMAL UG/ML
IGG SERPL-MCNC: NORMAL MG/DL (ref 610–1616)
IGG1 SER-MCNC: NORMAL MG/DL (ref 382–929)
IGG2 SER-MCNC: NORMAL MG/DL (ref 242–700)
IGG3 SER-MCNC: NORMAL MG/DL (ref 22–176)
IGG4 SER-MCNC: NORMAL MG/DL (ref 4–86)
S PNEUM DA 15B IGG SER-MCNC: 1 MCG/ML
S PNEUM DA 18C IGG SER-MCNC: 0.4 MCG/ML
S PNEUM DA 19A IGG SER-MCNC: 4.4 MCG/ML
S PNEUM DA 33F IGG SER-MCNC: 0.8 MCG/ML
S PNEUM DA 6B IGG SER-MCNC: 0.8 MCG/ML
S PNEUM DA 7F IGG SER-MCNC: 1.5 MCG/ML
S PNEUM DA 9V IGG SER-MCNC: 2.6 MCG/ML

## 2021-05-25 DIAGNOSIS — D80.2 IGA DEFICIENCY (H): Primary | ICD-10-CM

## 2021-05-25 NOTE — RESULT ENCOUNTER NOTE
"GamePresshart message sent:    T and B cell panel is within normal limits.  Immunoglobulin G and IgG subclasses are within normal limits.  Protective tetanus antibody levels.  10/23 protective pneumococcal antibody levels.  Negative anti-IgA antibodies.    -I recommend to get Pneumovax and then repeating pneumococcal antibody levels 6 weeks later.  I will order it today.     While negative anti-IgA antibodies are reassuring, patients with severe sIgAD, either with or without positive screening tests for anti-IgA antibodies (as patients with negative screens can become sensitized over time), are advised to obtain a medical alert guillermina/andria inscribed with the following information:    \"The patient has selective IgA deficiency. The patient is at risk for an allergic reaction to any injected plasma-containing blood products, and testing \""

## 2021-05-31 RX ORDER — LEVOTHYROXINE SODIUM 50 UG/1
TABLET ORAL
COMMUNITY
Start: 2021-05-07 | End: 2021-06-01

## 2021-06-01 ENCOUNTER — OFFICE VISIT (OUTPATIENT)
Dept: FAMILY MEDICINE | Facility: CLINIC | Age: 21
End: 2021-06-01
Payer: COMMERCIAL

## 2021-06-01 VITALS
TEMPERATURE: 98.1 F | SYSTOLIC BLOOD PRESSURE: 98 MMHG | HEART RATE: 100 BPM | OXYGEN SATURATION: 99 % | DIASTOLIC BLOOD PRESSURE: 66 MMHG

## 2021-06-01 DIAGNOSIS — E03.8 SUBCLINICAL HYPOTHYROIDISM: Primary | ICD-10-CM

## 2021-06-01 DIAGNOSIS — R76.8 ANTI-TPO ANTIBODIES PRESENT: ICD-10-CM

## 2021-06-01 DIAGNOSIS — K63.8219 SMALL INTESTINAL BACTERIAL OVERGROWTH: ICD-10-CM

## 2021-06-01 DIAGNOSIS — L70.0 ACNE VULGARIS: ICD-10-CM

## 2021-06-01 DIAGNOSIS — D80.2 IGA DEFICIENCY (H): ICD-10-CM

## 2021-06-01 PROCEDURE — 99214 OFFICE O/P EST MOD 30 MIN: CPT | Performed by: STUDENT IN AN ORGANIZED HEALTH CARE EDUCATION/TRAINING PROGRAM

## 2021-06-01 RX ORDER — LEVOTHYROXINE SODIUM 75 UG/1
75 TABLET ORAL DAILY
Qty: 60 TABLET | Refills: 0 | Status: SHIPPED | OUTPATIENT
Start: 2021-06-01 | End: 2021-07-08

## 2021-06-01 RX ORDER — LEVOTHYROXINE SODIUM 75 UG/1
75 TABLET ORAL DAILY
COMMUNITY
Start: 2021-06-01 | End: 2021-06-01

## 2021-06-01 RX ORDER — LEVOTHYROXINE SODIUM 100 UG/1
100 TABLET ORAL DAILY
COMMUNITY
Start: 2021-06-01 | End: 2021-06-01

## 2021-06-01 NOTE — PATIENT INSTRUCTIONS
Start a multivitamin and fish oil, Vitaprime twice a day and 1000 to 2000 mg of DHA/EPA.     Otherwise Pure Packs from [employer].     Recheck thyroid studies in 5 to 6 weeks. I sent a new prescription to your pharmacy.     Check with allergy about the pneumonia vaccine.

## 2021-06-01 NOTE — PROGRESS NOTES
Assessment & Plan     Subclinical hypothyroidism  Correct dose of synthroid provided. Recheck labs in about 2 months prior to refills.   - TSH with free T4 reflex  - levothyroxine (SYNTHROID/LEVOTHROID) 75 MCG tablet  Dispense: 60 tablet; Refill: 0    Anti-TPO antibodies present    Small intestinal bacterial overgrowth  Symptoms resolved after gluten elimination, course of rifaximin, probiotics.     Acne vulgaris  Discussed stress management, consideration of dairy elimination.     IgA deficiency (H)  Follow up with allergy as needed. She will contact them to clarify need for pneumonia vaccine.         Paige Mills MD  [facility]    Justin Mcghee is a 21 year old who presents for the following health issues     HPI     Here to establish care.    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 Had been seeing Dr. Chin. She did have gut issues previously but it was getting worse. She had been losing weight and met with a nutritionist at the time. Thyroid labs showed Hashimoto's, anti TPO antibodies.     Gut issues (bloating and nausea) - She had testing that showed SIBO through See as well as See GI profile. She took Rifaximin and noted significant improvement. Tried low FODMAP for 1 month and had improvement. Has been seeing Dr. John at [facility]. Suggested endoscopy to rule in or rule out celiac disease. She went off gluten in 2018 but wasn't fully strict. Cut out gluten completely 6 months ago. Had discussed ALCAT. Has also been taking TherBiotic synbiotic and rhodiola for stress management. Notes basically complete resolution in symptoms.     IgA deficiency - Has met with allergy re this. Recommended  To complete PCV immunization and return for titers. Follow up as needed for symptoms of recurrent infection. No routine follow up required.     Hashimoto's - Met with endocrine and started on Synthroid. Dose was increased from  50 to 75 mcg at the beginning of May but she didn't have the 50 mcg tabs so has been taking 100 mcg daily for past three week. . Notes improvement in fatigue.    Still struggling with some hormonal acnes --before and during menstruation. She is taking Vitex for this and notes improvement in symptoms.     Stress management--switching shifts has been challenging. Saw a therapist for 3 months from last fall through into January. Doing better now.           Objective    BP 98/66   Pulse 100   Temp 98.1  F (36.7  C)   LMP 05/06/2021 (Exact Date)   SpO2 99%   Breastfeeding No   There is no height or weight on file to calculate BMI.  Physical Exam   GENERAL: healthy, alert and no distress  EYES: Eyes grossly normal to inspection, PERRL and conjunctivae and sclerae normal  RESP: lungs clear to auscultation - no rales, rhonchi or wheezes  CV: regular rate and rhythm, normal S1 S2, no S3 or S4, no murmur, click or rub, no peripheral edema and peripheral pulses strong  MS: no gross musculoskeletal defects noted, no edema  PSYCH: mentation appears normal, affect normal/bright

## 2021-06-08 ENCOUNTER — MYC MEDICAL ADVICE (OUTPATIENT)
Dept: OBGYN | Facility: CLINIC | Age: 21
End: 2021-06-08

## 2021-06-08 ENCOUNTER — MYC MEDICAL ADVICE (OUTPATIENT)
Dept: ENDOCRINOLOGY | Facility: CLINIC | Age: 21
End: 2021-06-08

## 2021-06-08 DIAGNOSIS — R76.8 ANTI-TPO ANTIBODIES PRESENT: ICD-10-CM

## 2021-06-08 DIAGNOSIS — E03.8 SUBCLINICAL HYPOTHYROIDISM: Primary | ICD-10-CM

## 2021-06-08 NOTE — TELEPHONE ENCOUNTER
Patient recently found out she was pregnant. LMP 5/6. She notified her endocrinologist as well (has Hashimoto's). She will call to schedule nurse intake and NOB appointments. Do you want her to do anything in the meantime? She is wondering if there is anything she should know regarding her Hashimoto's diagnosis with pregnancy. Please advise. Eugenia Brown RN

## 2021-06-13 NOTE — TELEPHONE ENCOUNTER
New Appointment Needed  What is the reason for the visit:    Functional Medicine - patient was referred to Dr. Nixon by Isela Baez - I was not sure if Dr. Chin is still doing this or not?  Provider Preference: Dr. Chin and if she is not who she would recommend for the patient to see  How soon do you need to be seen?: Next available  Waitlist offered?: No  Okay to leave a detailed message:  Yes

## 2021-06-13 NOTE — TELEPHONE ENCOUNTER
I can see pt for a consult.  Please schedule a 60 minute functional medicine consult - either face to face or virtual visit.

## 2021-06-13 NOTE — TELEPHONE ENCOUNTER
Video visit scheduled for her initial consult with you on 12/29/20 at 4 pm. Please let me know if you want her to do any form before her appointment time. xl

## 2021-06-14 DIAGNOSIS — R76.8 ANTI-TPO ANTIBODIES PRESENT: ICD-10-CM

## 2021-06-14 DIAGNOSIS — E03.8 SUBCLINICAL HYPOTHYROIDISM: ICD-10-CM

## 2021-06-14 LAB — T4 SERPL-MCNC: 9.7 UG/DL (ref 4.5–13.9)

## 2021-06-14 PROCEDURE — 36415 COLL VENOUS BLD VENIPUNCTURE: CPT

## 2021-06-14 PROCEDURE — 84443 ASSAY THYROID STIM HORMONE: CPT

## 2021-06-14 PROCEDURE — 84436 ASSAY OF TOTAL THYROXINE: CPT

## 2021-06-14 NOTE — PATIENT INSTRUCTIONS - HE
Here are two references which provide a functional medicine approach to autoimmune disease:    The Autoimmune Solution by Dr. Madelaine Maria.    The Immune System Recovery Plan by Dr. Patricia Youssef    I think we should start with working on our digestion so we can consider the following 2 tests by [employer]:    Here is payment info about the SIBO test kit:    Here is information about the See 3 hour mannitol breath test that evaluates for small intestinal bacterial overgrowth:    1) The kit will be sent to your home.  I do not need to sign the requisition form.     2) Follow directions carefully to collect the specimens then send the kit back as directed.      3) You do need to include payment and there are 2 options.      If you want to do self - pay the cost is $179 and there will be no further billing charges.  If you want to see if insurance will pay, you can submit initial payment of $109 and [employer] will bill your insurance.  You could be responsible for up to $4244 total if they do not collect enough.  You can go online at [employer]apa[employer] to review these options.    Here is information about the [employer] GI Effects Comprehensive Stool profile:    1) The kit will be sent to your home.  I do not need to sign the requisition form.     2) Follow directions to collect the stool and then send the kit back as directed.      3) You do need to include payment and there are 2 options.      If you want to do self - pay the cost is $379 and there will be no further billing charges.  If you want to see if insurance will pay, you can submit initial payment of $171 and [employer] will bill your insurance.  You could be responsible for up to $400 total if they do not collect enough.  You can go online at [employer] to review these options.

## 2021-06-14 NOTE — PROGRESS NOTES
"Litzy Hatch is a 20 y.o. female who is being evaluated via a billable video visit.      The patient has been notified of following:     \"This video visit will be conducted via a call between you and your physician/provider. We have found that certain health care needs can be provided without the need for an in-person physical exam.  This service lets us provide the care you need with a video conversation.  If a prescription is necessary we can send it directly to your pharmacy.  If lab work is needed we can place an order for that and you can then stop by our lab to have the test done at a later time.    Video visits are billed at different rates depending on your insurance coverage. Please reach out to your insurance provider with any questions.    If during the course of the call the physician/provider feels a video visit is not appropriate, you will not be charged for this service.\"    Patient has given verbal consent to a Video visit? Yes  How would you like to obtain your AVS? AVS Preference: MyChart.  If dropped by the video visit, the video invitation should be sent to: Send to e-mail at: [email or web address]  Will anyone else be joining your video visit? No        Video Start Time: 4:40 PM    Additional provider notes: NO     SUBJECTIVE: Litzy Hatch is a 20 y.o. female who presents for a functional medicine consult with the following concerns:    1) nausea/ bloating / irregular bowel movements - She started to have issues after she returned from [place].  She was studying abroad for 6 months in 2018.  When she returned she had digestive issues.  She had a GI work-up that was negative.  No has bloating off and on.  No vomiting but has some nausea.  She has had some blood in bowel movements at times. She did meet with Isela nutritionist at [facility].  It seems like broccoli and greens can trigger her symptoms.  Going to see GI but would like to avoid scope exams if possible.    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    3) Acne      TIMELINE:    Antecedents ( Preconception / prenatal ): No issues with her mother's pregnancy  Triggers:  Birth - .  Breast fed.  Early childhood- asthma/ febrile seizures/ reoccurring ear infections  Adolescence - no concerns  Adulthood- Recently diagnosed with Hashimoto's disease- slight elevation in her TSH.      Mediators/ Perpetuators:    SH: Household- .  Lives with .  Employment - RN at [employer]  Sleep - 8-9 hours.  No issues.  Movement - Runs 2-3 days/ week  Nutrition - B- cheese stick/ banana / bread. L- chicken/ salad.  D- beef/ turkey / casseroles.  Wine - 1-2x a week.  Has coffee in am.  Stressors - work  Spirituality - Denominational      Past treatments:  Medical- none  Complementary - none      OBJECTIVE: GENERAL: Healthy, alert and no distress  EYES: Eyes grossly normal to inspection. No discharge or erythema, or obvious scleral/conjunctival abnormalities.  RESP: No audible wheeze, cough, or visible cyanosis.  No visible retractions or increased work of breathing.    NEURO: Cranial nerves grossly intact. Mentation and speech appropriate for age.  PSYCH: Mentation appears normal, affect normal/bright, judgement and insight intact, normal speech and appearance well-groomed    Labs:   TSH- 5.66  FT4- 0.91  Thyroid peroxidase antibodies- 192  ESR- 7  CRP- <2.9  TTG IgA and IgG - negative    Litzy was seen today for functional medicine program.    Diagnoses and all orders for this visit:    Bloating - Need to consider SIBO.  I recommend we do a 3 hour breath test.  She could also have food sensitivities / need for digestive enzymes.    Digestive disorder - She could have food sensitivities.  Would like to r/o dysbiosis/ parasitic infection given her time in [facility] so I recommend she consider the See GI Effects test.  Will plan to do a 5 R plan to treat her gut after  test results are back.    Hashimoto's disease - Will work on her gut issues first and make sure she has adequate nutrients for her thyroid gland.       Patient Instructions   Here are two references which provide a functional medicine approach to autoimmune disease:    The Autoimmune Solution by Dr. Madelaine Maria.    The Immune System Recovery Plan by Dr. Patricia Youssef    I think we should start with working on our digestion so we can consider the following 2 tests by [employer]:    Here is payment info about the SIBO test kit:    Here is information about the ipnexus 3 hour mannitol breath test that evaluates for small intestinal bacterial overgrowth:    1) The kit will be sent to your home.  I do not need to sign the requisition form.     2) Follow directions carefully to collect the specimens then send the kit back as directed.      3) You do need to include payment and there are 2 options.      If you want to do self - pay the cost is $179 and there will be no further billing charges.  If you want to see if insurance will pay, you can submit initial payment of $109 and [employer] will bill your insurance.  You could be responsible for up to $4244 total if they do not collect enough.  You can go online at Booklr/[employer] to review these options.    Here is information about the ipnexus GI Effects Comprehensive Stool profile:    1) The kit will be sent to your home.  I do not need to sign the requisition form.     2) Follow directions to collect the stool and then send the kit back as directed.      3) You do need to include payment and there are 2 options.      If you want to do self - pay the cost is $379 and there will be no further billing charges.  If you want to see if insurance will pay, you can submit initial payment of $171 and [employer] will bill your insurance.  You could be responsible for up to $400 total if they do not collect enough.  You can go online at [employer] to review these options.              Video-Visit Details    Type of service:  Video Visit    Video End Time (time video stopped): 5:05  Originating Location (pt. Location): Home    Distant Location (provider location):  [facility]     Platform used for Video Visit: Bryan Chin MD

## 2021-06-15 LAB — TSH SERPL DL<=0.005 MIU/L-ACNC: 3.84 MU/L (ref 0.4–4)

## 2021-06-15 NOTE — PATIENT INSTRUCTIONS - HE
Here are my recommendations to treat SIBO:    1) Take the rifaxamin as directed.    2) At the same time as the rifaxamin, start the Iberogast - work up to 20 drops three times a day.  Can mix in water or herbal tea.  ( botanicals to help with motility in small intestine - can purchase on-line )    3) Make sure you are in a relaxed mood when eating to stimulate the parasympathetic nervous system - practice mindful eating/ take 3 slow breaths before starting to eat / make sure you are not distracted while eating.    4) Try to leave 4 hours in between meals and do not eat 3 hours before bedtime.  OK to drink fluids in between meals.    5 ) After finishing the rifaxamin, you can start on a low FODMAP diet for 6-8 weeks then can try to slowly reintroduce the higher FODMAP foods slowly.  You can use the deo - [employer] FODMAP- to help with the diet.      Follow-up with me in 1 month.  I will let you know the final results of the See stool test when it returns.

## 2021-06-15 NOTE — PROGRESS NOTES
Litzy Hatch is a 20 y.o. female who is being evaluated via a billable video visit.      How would you like to obtain your AVS? MyChart.  If dropped from the video visit, the video invitation should be resent by: Send to e-mail at: [email or web address]  Will anyone else be joining your video visit? No      Video Start Time: 5:21 PM    Assessment & Plan     Litzy was seen today for functional medicine program.    Diagnoses and all orders for this visit:    Small intestinal bacterial overgrowth    Hashimoto's thyroiditis- I recommend she start on thyroid replacement.  This could be weaned off in future if her autoimmune disease improves with functional medicine treatment.    In regards to her acne- I would try eliminating dairy first.      Patient Instructions   Here are my recommendations to treat SIBO:    1) Take the rifaxamin as directed.    2) At the same time as the rifaxamin, start the Iberogast - work up to 20 drops three times a day.  Can mix in water or herbal tea.  ( botanicals to help with motility in small intestine - can purchase on-line )    3) Make sure you are in a relaxed mood when eating to stimulate the parasympathetic nervous system - practice mindful eating/ take 3 slow breaths before starting to eat / make sure you are not distracted while eating.    4) Try to leave 4 hours in between meals and do not eat 3 hours before bedtime.  OK to drink fluids in between meals.    5 ) After finishing the rifaxamin, you can start on a low FODMAP diet for 6-8 weeks then can try to slowly reintroduce the higher FODMAP foods slowly.  You can use the deo - [employer] FODMAP- to help with the diet.      Follow-up with me in 1 month.  I will let you know the final results of the See stool test when it returns.         Review of external notes as documented in note  Review of the result(s) of each unique test - See GI Stool Effects and 3 hour breath test and also her thyroid function testing in  December         Return in about 1 month (around 3/15/2021) for Recheck.    Brittani Chin MD  [facility]    Justin Hatch is 20 y.o. and presents today for the following health issues   HPI She is here to follow-up on See 3 hour breath test and GI Stool Effects.  Her main complaints are bloating and nausea.  Stools can vary from loose on certain days to hard and small on other days.  She did see GI who recommended breath test / gluten challenge followed by EGD / consider PPI.  Pt does not want to do scope exam at this time.  Would rather try functional medicine approach first.     She had slightly elevated TSH and it was repeated in December and still high at 5.66.  TPO elevated.  She is reading The Autoimmune Solution by Madelaine Maria.  Was offered thyroid replacement by her primary and wonders about taking it.    She continues to work on her work stress. Works as RN in ICU.  Has reached out to employer for some support.  Feels that she is doing better with stress at this time.    She has cystic acne that is hormonal and wonders about taking spironolactone which has been offered by dermatology.      Review of Systems  As above      Objective       Vitals:  No vitals were obtained today due to virtual visit.    Physical Exam  GENERAL: Healthy, alert and no distress  EYES: Eyes grossly normal to inspection. No discharge or erythema, or obvious scleral/conjunctival abnormalities.  RESP: No audible wheeze, cough, or visible cyanosis.  No visible retractions or increased work of breathing.    NEURO: Cranial nerves grossly intact. Mentation and speech appropriate for age.  PSYCH: Mentation appears normal, affect normal/bright, judgement and insight intact, normal speech and appearance well-groomed    See 3 hour breath test - H2- 34 ( high ), CH4 - 10 ( high)  See GI Stool Effects - pending            Video-Visit Details    Type of service:  Video  Visit    Video End Time (time video stopped): 5:45 pm  Originating Location (pt. Location): Home    Distant Location (provider location):  [facility]     Platform used for Video Visit: Bryan

## 2021-06-16 NOTE — PATIENT INSTRUCTIONS - HE
Consider trying TherBiotic Synbiotic from [employer] ( pre/ probiotic) and eating pre/ probiotic foods.    Follow-up with immunology and endocrine consults.    I would recommend checking a TSH.    Continue working on work/ life balance/ stress reduction techniques.    F/U 6 months.

## 2021-06-16 NOTE — PROGRESS NOTES
Litzy Hatch is a 20 y.o. female who is being evaluated via a billable video visit.      How would you like to obtain your AVS? MyChart.  If dropped from the video visit, the video invitation should be resent by: Send to e-mail at: [email or web address]  Will anyone else be joining your video visit? No    Video Start Time: 8:04 am    Assessment & Plan     Litzy was seen today for follow-up.    Diagnoses and all orders for this visit:    Hashimoto's thyroiditis    Small intestinal bacterial overgrowth    IgA deficiency (H)      Patient Instructions   Consider trying TherBiotic Synbiotic from [employer] ( pre/ probiotic) and eating pre/ probiotic foods.    Follow-up with immunology and endocrine consults.    I would recommend checking a TSH.    Continue working on work/ life balance/ stress reduction techniques.    F/U 6 months.     Review of external notes as documented in note  30 minutes spent on the date of the encounter doing chart review, history and exam, documentation and further activities per the note       Return in about 6 months (around 10/19/2021) for Recheck.    Brittani Chin MD  [facility]   Litzy Hatch is 20 y.o. and presents today for the following health issues   HPI   1) SIBO- She is taking Iberogast two times a day.  Has not started probiotic.  Wonders about kombucha. Her digestion has been good with no symptoms.  Ate gluten 2 weeks ago and got flushing in her face.    2) Stress- Doing better.  Eating lunch in a new place at work and that is more calming. She did work long hours last week but still feels well.    3) She has low IgA so is getting referred to immunologist.  GI thinking about scope exam but pt would like to hold off.    4) Hypothyroidism- Had recent thyroid labs checked but TSH not included.  Continues on levothyroxine.  Feels well but wonders about pork thyroid replacement.  Going to see Dr. Kahn at  the [facility] for endocrinology.        Review of Systems  As above      Objective       Vitals:  No vitals were obtained today due to virtual visit.    Physical Exam  GENERAL: Healthy, alert and no distress  EYES: Eyes grossly normal to inspection. No discharge or erythema, or obvious scleral/conjunctival abnormalities.  RESP: No audible wheeze, cough, or visible cyanosis.  No visible retractions or increased work of breathing.    NEURO: Cranial nerves grossly intact. Mentation and speech appropriate for age.  PSYCH: Mentation appears normal, affect normal/bright, judgement and insight intact, normal speech and appearance well-groomed              Video-Visit Details    Type of service:  Video Visit    Video End Time (time video stopped): 8:25 am  Originating Location (pt. Location): Home    Distant Location (provider location):  [facility]     Platform used for Video Visit: [employer]se

## 2021-06-16 NOTE — PROGRESS NOTES
Litzy Hatch is a 20 y.o. female who is being evaluated via a billable video visit.      How would you like to obtain your AVS? MyChart.  If dropped from the video visit, the video invitation should be resent by: Send to e-mail at: [email or web address]  Will anyone else be joining your video visit? No    Video Start Time: 10:44 am    Assessment & Plan     Litzy was seen today for follow-up.    Diagnoses and all orders for this visit:    Digestive disorder- C/W SIBO.  Will add gentle pre/ probiotic.    Small intestinal bacterial overgrowth- I recommend she add in Iberogast and continue with mindful eating practices / space out meals.  Slowly add foods back into her diet.    Hashimoto's thyroiditis- Recheck TFTs.  Consider food sensitivity testing in future.      Patient Instructions   1) To order supplements go to the web site: [employer]  Use my authorization number to order the recommended supplements: S99     Ther-Biotic Synbiotic Take one daily  ( probiotic and prebiotic that should be well tolerated)    2) If you are interested in food sensitivity testing consider the ALCAT test - go to [email or web address] to learn more about the test.         [id number]}     Return in about 2 months (around 5/29/2021) for Recheck.    Brittani hCin MD  [facility]   Litzy Hatch is 20 y.o. and presents today for the following health issues   HPI   She is here to f/u on SIBO treatment / Hashimoto's hypothyroidism.  She did take the rifaxamin and is eating a low FODMAP diet. She feels much improved.  She has identified trigger foods like pea pods and removed from diet.  Has only had one episode of nausea.  Her skin looks better.  She has not yet tried the Iberogast supplement.  She is eating slower and chewing her food.  Trying to be mindful around eating.  She completed the See GI Stool test and is here for results.    She is on thyroid replacement and  going to have her TFTs recheck tomorrow.  Also has been on vitamin D and will get that checked as well.    She tried Mother Dirt products and her skin is much better.  The rash she had from wearing face mask is gone.    She wonders about taking Vitex supplement to regulate periods.    Review of Systems  As above      Objective       Vitals:  No vitals were obtained today due to virtual visit.    Physical Exam  GENERAL: Healthy, alert and no distress  EYES: Eyes grossly normal to inspection. No discharge or erythema, or obvious scleral/conjunctival abnormalities.  RESP: No audible wheeze, cough, or visible cyanosis.  No visible retractions or increased work of breathing.    NEURO: Cranial nerves grossly intact. Mentation and speech appropriate for age.  PSYCH: Mentation appears normal, affect normal/bright, judgement and insight intact, normal speech and appearance well-groomed    See GI Stool Effects: Maldigestion 0 / Inflammation 0 / Dysbiosis 3 / Metabolic imbalance 1 / Infection 0          Video-Visit Details    Type of service:  Video Visit    Video End Time (time video stopped): 10:58 am  Originating Location (pt. Location): Home    Distant Location (provider location):  [facility]     Platform used for Video Visit: [email or web address]

## 2021-06-16 NOTE — PATIENT INSTRUCTIONS - HE
1) To order supplements go to the web site: [employer]  Use my authorization number to order the recommended supplements: S99     Ther-Biotic Synbiotic Take one daily  ( probiotic and prebiotic that should be well tolerated)    2) If you are interested in food sensitivity testing consider the ALCAT test - go to [email or web address] to learn more about the test.

## 2021-06-21 ENCOUNTER — PRENATAL OFFICE VISIT (OUTPATIENT)
Dept: NURSING | Facility: CLINIC | Age: 21
End: 2021-06-21
Payer: COMMERCIAL

## 2021-06-21 ENCOUNTER — TELEPHONE (OUTPATIENT)
Dept: OBGYN | Facility: CLINIC | Age: 21
End: 2021-06-21

## 2021-06-21 VITALS — BODY MASS INDEX: 19.47 KG/M2 | HEIGHT: 70 IN | WEIGHT: 136 LBS

## 2021-06-21 DIAGNOSIS — Z23 NEED FOR TDAP VACCINATION: ICD-10-CM

## 2021-06-21 DIAGNOSIS — R11.2 NAUSEA AND VOMITING, INTRACTABILITY OF VOMITING NOT SPECIFIED, UNSPECIFIED VOMITING TYPE: Primary | ICD-10-CM

## 2021-06-21 DIAGNOSIS — Z34.00 ENCOUNTER FOR SUPERVISION OF NORMAL FIRST PREGNANCY: Primary | ICD-10-CM

## 2021-06-21 PROBLEM — N94.3 PMS (PREMENSTRUAL SYNDROME): Status: RESOLVED | Noted: 2021-03-11 | Resolved: 2021-06-21

## 2021-06-21 PROCEDURE — 99207 PR NO CHARGE NURSE ONLY: CPT

## 2021-06-21 SDOH — SOCIAL STABILITY: SOCIAL INSECURITY
WITHIN THE LAST YEAR, HAVE TO BEEN RAPED OR FORCED TO HAVE ANY KIND OF SEXUAL ACTIVITY BY YOUR PARTNER OR EX-PARTNER?: NO

## 2021-06-21 SDOH — SOCIAL STABILITY: SOCIAL INSECURITY
WITHIN THE LAST YEAR, HAVE YOU BEEN KICKED, HIT, SLAPPED, OR OTHERWISE PHYSICALLY HURT BY YOUR PARTNER OR EX-PARTNER?: NO

## 2021-06-21 SDOH — SOCIAL STABILITY: SOCIAL NETWORK: HOW OFTEN DO YOU GET TOGETHER WITH FRIENDS OR RELATIVES?: NOT ASKED

## 2021-06-21 SDOH — SOCIAL STABILITY: SOCIAL INSECURITY: WITHIN THE LAST YEAR, HAVE YOU BEEN HUMILIATED OR EMOTIONALLY ABUSED IN OTHER WAYS BY YOUR PARTNER OR EX-PARTNER?: NO

## 2021-06-21 SDOH — HEALTH STABILITY: PHYSICAL HEALTH: ON AVERAGE, HOW MANY MINUTES DO YOU ENGAGE IN EXERCISE AT THIS LEVEL?: NOT ASKED

## 2021-06-21 SDOH — SOCIAL STABILITY: SOCIAL NETWORK: HOW OFTEN DO YOU ATTEND CHURCH OR RELIGIOUS SERVICES?: NOT ASKED

## 2021-06-21 SDOH — SOCIAL STABILITY: SOCIAL NETWORK: HOW OFTEN DO YOU ATTENT MEETINGS OF THE CLUB OR ORGANIZATION YOU BELONG TO?: NOT ASKED

## 2021-06-21 SDOH — HEALTH STABILITY: MENTAL HEALTH
STRESS IS WHEN SOMEONE FEELS TENSE, NERVOUS, ANXIOUS, OR CAN'T SLEEP AT NIGHT BECAUSE THEIR MIND IS TROUBLED. HOW STRESSED ARE YOU?: NOT AT ALL

## 2021-06-21 SDOH — SOCIAL STABILITY: SOCIAL NETWORK: ARE YOU MARRIED, WIDOWED, DIVORCED, SEPARATED, NEVER MARRIED, OR LIVING WITH A PARTNER?: MARRIED

## 2021-06-21 SDOH — SOCIAL STABILITY: SOCIAL NETWORK
DO YOU BELONG TO ANY CLUBS OR ORGANIZATIONS SUCH AS CHURCH GROUPS UNIONS, FRATERNAL OR ATHLETIC GROUPS, OR SCHOOL GROUPS?: NOT ASKED

## 2021-06-21 SDOH — SOCIAL STABILITY: SOCIAL NETWORK: IN A TYPICAL WEEK, HOW MANY TIMES DO YOU TALK ON THE PHONE WITH FAMILY, FRIENDS, OR NEIGHBORS?: NOT ASKED

## 2021-06-21 SDOH — HEALTH STABILITY: PHYSICAL HEALTH: ON AVERAGE, HOW MANY DAYS PER WEEK DO YOU ENGAGE IN MODERATE TO STRENUOUS EXERCISE (LIKE A BRISK WALK)?: NOT ASKED

## 2021-06-21 SDOH — SOCIAL STABILITY: SOCIAL INSECURITY: WITHIN THE LAST YEAR, HAVE YOU BEEN AFRAID OF YOUR PARTNER OR EX-PARTNER?: NO

## 2021-06-21 ASSESSMENT — MIFFLIN-ST. JEOR: SCORE: 1444.2

## 2021-06-21 NOTE — TELEPHONE ENCOUNTER
Patient requesting antinausea medication to her updated pharmacy. Tried B6, Unisom, not effective.    Patient works as a nurse in ICU, so would like to try another medication, she has difficulty working with the nausea/vomiting      Reta Blair LPN

## 2021-06-21 NOTE — PROGRESS NOTES
Important Information for Provider:     New ob nurse intake by phone, first pregnancy. Patient has tried B6, Unisom for nausea, not effective. Patient is still vomiting( at least 1 time daily). Encouraged her to try Pedialyte or Gatorade to help with electrolytes. Sent TE to Dr Franco to send antinausea medication to the updated pharmacy. Handouts reviewed and mailed. Discussed genetic screening. Patient is seen by endocrinology for hypothyroidism, recent lab 6/14/2021. Ultrasound and NOB with Dr Munroe 7/08/2021  Patient stopped taking Rhodiola for anxiety( natural supplement)       Caffeine intake/servings daily - 0  Calcium intake/servings daily - 3  Exercise 5 times weekly - describe ; plays volleyball, walks, runs, weights, precautions given  Sunscreen used - Yes  Seatbelts used - Yes  Guns stored in the home - No  Self Breast Exam - Yes  Pap test up to date -  Yes  Eye exam up to date -  Yes  Dental exam up to date -  Yes  Immunizations reviewed and up to date - Yes  Abuse: Current or Past (Physical, Sexual or Emotional) - No  Do you feel safe in your environment - Yes  Do you cope well with stress - Yes  Do you suffer from insomnia - No             Prenatal OB Questionnaire  Patient supplied answers from flow sheet for:  Prenatal OB Questionnaire.  Past Medical History  Have you ever received care for your mental health? : No  Have you ever been in a major accident or suffered serious trauma?: No  Within the last year, has anyone hit, slapped, kicked or otherwise hurt you?: No  In the last year, has anyone forced you to have sex when you didn't want to?: No    Past Medical History 2   Have you ever received a blood transfusion?: No  Would you accept a blood transfusion if was medically recommended?: , patient has Anti-TPO Antibodies   Does anyone in your home smoke?: No   Is your blood type Rh negative?: Unknown  Have you ever ?: No  Have you been hospitalized for a nonsurgical reason excluding  normal delivery?: No  Have you ever had an abnormal pap smear?: No    Past Medical History (Continued)  Do you have a history of abnormalities of the uterus?: No  Did your mother take GAGAN or any other hormones when she was pregnant with you?: No  Do you have any other problems we have not asked about which you feel may be important to this pregnancy?: No       Allergies as of 6/21/2021:    Allergies as of 06/21/2021 - Reviewed 06/21/2021   Allergen Reaction Noted     Gluten meal Dermatitis, Diarrhea, GI Disturbance, Itching, Nausea, and Rash 05/31/2021       Current medications are:  Current Outpatient Medications   Medication Sig Dispense Refill     levothyroxine (SYNTHROID/LEVOTHROID) 75 MCG tablet Take 1 tablet (75 mcg) by mouth daily 60 tablet 0     tretinoin (RETIN-A) 0.05 % external cream            Early ultrasound screening tool:    Does patient have irregular periods?  No  Did patient use hormonal birth control in the three months prior to positive urine pregnancy test? No  Is the patient breastfeeding?  No  Is the patient 10 weeks or greater at time of education visit?  No

## 2021-06-22 ENCOUNTER — APPOINTMENT (OUTPATIENT)
Dept: URBAN - METROPOLITAN AREA CLINIC 260 | Age: 21
Setting detail: DERMATOLOGY
End: 2021-06-23

## 2021-06-22 VITALS — HEIGHT: 69 IN | WEIGHT: 135 LBS | RESPIRATION RATE: 16 BRPM

## 2021-06-22 DIAGNOSIS — L70.0 ACNE VULGARIS: ICD-10-CM

## 2021-06-22 DIAGNOSIS — L30.8 OTHER SPECIFIED DERMATITIS: ICD-10-CM

## 2021-06-22 PROCEDURE — OTHER PRESCRIPTION MEDICATION MANAGEMENT: OTHER

## 2021-06-22 PROCEDURE — 99214 OFFICE O/P EST MOD 30 MIN: CPT

## 2021-06-22 PROCEDURE — OTHER COUNSELING: OTHER

## 2021-06-22 PROCEDURE — OTHER PRESCRIPTION: OTHER

## 2021-06-22 RX ORDER — METOCLOPRAMIDE 10 MG/1
10 TABLET ORAL
Qty: 60 TABLET | Refills: 3 | Status: SHIPPED | OUTPATIENT
Start: 2021-06-22 | End: 2021-07-08

## 2021-06-22 RX ORDER — AZITHROMYCIN MONOHYDRATE 500 MG/1
TABLET, FILM COATED ORAL QD
Qty: 24 | Refills: 3 | Status: ERX

## 2021-06-22 RX ORDER — AZITHROMYCIN MONOHYDRATE 500 MG/1
TABLET, FILM COATED ORAL QD
Qty: 24 | Refills: 3 | Status: ERX | COMMUNITY
Start: 2021-06-22

## 2021-06-22 ASSESSMENT — LOCATION ZONE DERM
LOCATION ZONE: HAND
LOCATION ZONE: FACE

## 2021-06-22 ASSESSMENT — LOCATION SIMPLE DESCRIPTION DERM
LOCATION SIMPLE: LEFT HAND
LOCATION SIMPLE: LEFT CHEEK
LOCATION SIMPLE: RIGHT CHEEK
LOCATION SIMPLE: RIGHT HAND

## 2021-06-22 ASSESSMENT — SEVERITY ASSESSMENT: SEVERITY: CLEAR

## 2021-06-22 ASSESSMENT — LOCATION DETAILED DESCRIPTION DERM
LOCATION DETAILED: RIGHT RADIAL DORSAL HAND
LOCATION DETAILED: RIGHT INFERIOR CENTRAL MALAR CHEEK
LOCATION DETAILED: LEFT ULNAR DORSAL HAND
LOCATION DETAILED: LEFT INFERIOR CENTRAL MALAR CHEEK

## 2021-06-22 ASSESSMENT — SEVERITY ASSESSMENT OVERALL AMONG ALL PATIENTS
IN YOUR EXPERIENCE, AMONG ALL PATIENTS YOU HAVE SEEN WITH THIS CONDITION, HOW SEVERE IS THIS PATIENT'S CONDITION?: INFLAMMATORY LESIONS MORE APPARENT; MANY COMEDONES AND PAPULES/PUSTULES, +/- FEW NODULOCYSTIC LESIONS

## 2021-06-22 NOTE — TELEPHONE ENCOUNTER
Script for reglan done. Let her know if that's not working we can do zofran    Thanks  Deepa Franco MD

## 2021-06-22 NOTE — PROCEDURE: PRESCRIPTION MEDICATION MANAGEMENT
Continue Regimen: TMC cream as needed up to 15 days per month\\nMoisturizer daily.
Plan: Recommended double check with OBGYN about any medication concerns.
Continue Regimen: Azelaic Acid
Discontinue Regimen: Tretinoin topical until post pregnancy
Render In Strict Bullet Format?: No
Detail Level: Simple
Modify Regimen: Sanitize instead of soap and water if possible
Initiate Treatment: 250 mg daily of Azithromycin

## 2021-06-22 NOTE — PROCEDURE: COUNSELING
Erythromycin Pregnancy And Lactation Text: This medication is Pregnancy Category B and is considered safe during pregnancy. It is also excreted in breast milk.
Isotretinoin Pregnancy And Lactation Text: This medication is Pregnancy Category X and is considered extremely dangerous during pregnancy. It is unknown if it is excreted in breast milk.
Spironolactone Pregnancy And Lactation Text: This medication can cause feminization of the male fetus and should be avoided during pregnancy. The active metabolite is also found in breast milk.
Benzoyl Peroxide Counseling: Patient counseled that medicine may cause skin irritation and bleach clothing.  In the event of skin irritation, the patient was advised to reduce the amount of the drug applied or use it less frequently.   The patient verbalized understanding of the proper use and possible adverse effects of benzoyl peroxide.  All of the patient's questions and concerns were addressed.
Topical Retinoid counseling:  Patient advised to apply a pea-sized amount only at bedtime and wait 30 minutes after washing their face before applying.  If too drying, patient may add a non-comedogenic moisturizer. The patient verbalized understanding of the proper use and possible adverse effects of retinoids.  All of the patient's questions and concerns were addressed.
Doxycycline Counseling:  Patient counseled regarding possible photosensitivity and increased risk for sunburn.  Patient instructed to avoid sunlight, if possible.  When exposed to sunlight, patients should wear protective clothing, sunglasses, and sunscreen.  The patient was instructed to call the office immediately if the following severe adverse effects occur:  hearing changes, easy bruising/bleeding, severe headache, or vision changes.  The patient verbalized understanding of the proper use and possible adverse effects of doxycycline.  All of the patient's questions and concerns were addressed.
Tazorac Pregnancy And Lactation Text: This medication is not safe during pregnancy. It is unknown if this medication is excreted in breast milk.
Tetracycline Pregnancy And Lactation Text: This medication is Pregnancy Category D and not consider safe during pregnancy. It is also excreted in breast milk.
Spironolactone Counseling: Patient advised regarding risks of diarrhea, abdominal pain, hyperkalemia, birth defects (for female patients), liver toxicity and renal toxicity. The patient may need blood work to monitor liver and kidney function and potassium levels while on therapy. The patient verbalized understanding of the proper use and possible adverse effects of spironolactone.  All of the patient's questions and concerns were addressed.
Dapsone Counseling: I discussed with the patient the risks of dapsone including but not limited to hemolytic anemia, agranulocytosis, rashes, methemoglobinemia, kidney failure, peripheral neuropathy, headaches, GI upset, and liver toxicity.  Patients who start dapsone require monitoring including baseline LFTs and weekly CBCs for the first month, then every month thereafter.  The patient verbalized understanding of the proper use and possible adverse effects of dapsone.  All of the patient's questions and concerns were addressed.
Detail Level: Zone
Topical Sulfur Applications Pregnancy And Lactation Text: This medication is Pregnancy Category C and has an unknown safety profile during pregnancy. It is unknown if this topical medication is excreted in breast milk.
Isotretinoin Counseling: Patient should get monthly blood tests, not donate blood, not drive at night if vision affected, not share medication, and not undergo elective surgery for 6 months after tx completed. Side effects reviewed, pt to contact office should one occur.
Bactrim Counseling:  I discussed with the patient the risks of sulfa antibiotics including but not limited to GI upset, allergic reaction, drug rash, diarrhea, dizziness, photosensitivity, and yeast infections.  Rarely, more serious reactions can occur including but not limited to aplastic anemia, agranulocytosis, methemoglobinemia, blood dyscrasias, liver or kidney failure, lung infiltrates or desquamative/blistering drug rashes.
High Dose Vitamin A Pregnancy And Lactation Text: High dose vitamin A therapy is contraindicated during pregnancy and breast feeding.
Topical Sulfur Applications Counseling: Topical Sulfur Counseling: Patient counseled that this medication may cause skin irritation or allergic reactions.  In the event of skin irritation, the patient was advised to reduce the amount of the drug applied or use it less frequently.   The patient verbalized understanding of the proper use and possible adverse effects of topical sulfur application.  All of the patient's questions and concerns were addressed.
Use Enhanced Medication Counseling?: No
Doxycycline Pregnancy And Lactation Text: This medication is Pregnancy Category D and not consider safe during pregnancy. It is also excreted in breast milk but is considered safe for shorter treatment courses.
Birth Control Pills Counseling: Birth Control Pill Counseling: I discussed with the patient the potential side effects of OCPs including but not limited to increased risk of stroke, heart attack, thrombophlebitis, deep venous thrombosis, hepatic adenomas, breast changes, GI upset, headaches, and depression.  The patient verbalized understanding of the proper use and possible adverse effects of OCPs. All of the patient's questions and concerns were addressed.
Dapsone Pregnancy And Lactation Text: This medication is Pregnancy Category C and is not considered safe during pregnancy or breast feeding.
Tazorac Counseling:  Patient advised that medication is irritating and drying.  Patient may need to apply sparingly and wash off after an hour before eventually leaving it on overnight.  The patient verbalized understanding of the proper use and possible adverse effects of tazorac.  All of the patient's questions and concerns were addressed.
Birth Control Pills Pregnancy And Lactation Text: This medication should be avoided if pregnant and for the first 30 days post-partum.
Topical Clindamycin Pregnancy And Lactation Text: This medication is Pregnancy Category B and is considered safe during pregnancy. It is unknown if it is excreted in breast milk.
Tetracycline Counseling: Patient counseled regarding possible photosensitivity and increased risk for sunburn.  Patient instructed to avoid sunlight, if possible.  When exposed to sunlight, patients should wear protective clothing, sunglasses, and sunscreen.  The patient was instructed to call the office immediately if the following severe adverse effects occur:  hearing changes, easy bruising/bleeding, severe headache, or vision changes.  The patient verbalized understanding of the proper use and possible adverse effects of tetracycline.  All of the patient's questions and concerns were addressed. Patient understands to avoid pregnancy while on therapy due to potential birth defects.
Benzoyl Peroxide Pregnancy And Lactation Text: This medication is Pregnancy Category C. It is unknown if benzoyl peroxide is excreted in breast milk.
Sarecycline Counseling: Patient advised regarding possible photosensitivity and discoloration of the teeth, skin, lips, tongue and gums.  Patient instructed to avoid sunlight, if possible.  When exposed to sunlight, patients should wear protective clothing, sunglasses, and sunscreen.  The patient was instructed to call the office immediately if the following severe adverse effects occur:  hearing changes, easy bruising/bleeding, severe headache, or vision changes.  The patient verbalized understanding of the proper use and possible adverse effects of sarecycline.  All of the patient's questions and concerns were addressed.
Topical Retinoid Pregnancy And Lactation Text: This medication is Pregnancy Category C. It is unknown if this medication is excreted in breast milk.
Azithromycin Pregnancy And Lactation Text: This medication is considered safe during pregnancy and is also secreted in breast milk.
Azithromycin Counseling:  I discussed with the patient the risks of azithromycin including but not limited to GI upset, allergic reaction, drug rash, diarrhea, and yeast infections.
Topical Clindamycin Counseling: Patient counseled that this medication may cause skin irritation or allergic reactions.  In the event of skin irritation, the patient was advised to reduce the amount of the drug applied or use it less frequently.   The patient verbalized understanding of the proper use and possible adverse effects of clindamycin.  All of the patient's questions and concerns were addressed.
Bactrim Pregnancy And Lactation Text: This medication is Pregnancy Category D and is known to cause fetal risk.  It is also excreted in breast milk.
Minocycline Counseling: Patient advised regarding possible photosensitivity and discoloration of the teeth, skin, lips, tongue and gums.  Patient instructed to avoid sunlight, if possible.  When exposed to sunlight, patients should wear protective clothing, sunglasses, and sunscreen.  The patient was instructed to call the office immediately if the following severe adverse effects occur:  hearing changes, easy bruising/bleeding, severe headache, or vision changes.  The patient verbalized understanding of the proper use and possible adverse effects of minocycline.  All of the patient's questions and concerns were addressed.
Erythromycin Counseling:  I discussed with the patient the risks of erythromycin including but not limited to GI upset, allergic reaction, drug rash, diarrhea, increase in liver enzymes, and yeast infections.
High Dose Vitamin A Counseling: Side effects reviewed, pt to contact office should one occur.

## 2021-06-23 ENCOUNTER — VIRTUAL VISIT (OUTPATIENT)
Dept: GASTROENTEROLOGY | Facility: CLINIC | Age: 21
End: 2021-06-23
Payer: COMMERCIAL

## 2021-06-23 VITALS — BODY MASS INDEX: 19.8 KG/M2 | WEIGHT: 136 LBS

## 2021-06-23 DIAGNOSIS — R14.0 BLOATING: ICD-10-CM

## 2021-06-23 DIAGNOSIS — R11.0 NAUSEA: ICD-10-CM

## 2021-06-23 DIAGNOSIS — K63.8219 SMALL INTESTINAL BACTERIAL OVERGROWTH: ICD-10-CM

## 2021-06-23 DIAGNOSIS — D80.2 IGA DEFICIENCY (H): Primary | ICD-10-CM

## 2021-06-23 PROCEDURE — 99214 OFFICE O/P EST MOD 30 MIN: CPT | Mod: 95 | Performed by: INTERNAL MEDICINE

## 2021-06-23 RX ORDER — AZITHROMYCIN MONOHYDRATE 250 MG/1
250 TABLET, FILM COATED ORAL DAILY
Qty: 30 | Refills: 1 | Status: ERX | COMMUNITY
Start: 2021-06-22

## 2021-06-23 NOTE — LETTER
"    6/23/2021         RE: Litzy Hatch  [address]  [place] [address]        Dear Colleague,    Thank you for referring your patient, Litzy Hatch, to the [facility] [place]. Please see a copy of my visit note below.    Litzy Hatch is a 21 year old female who is being evaluated via a billable video visit.      The patient has been notified of following:     \"This video visit will be conducted via a call between you and your physician/provider. We have found that certain health care needs can be provided without the need for an in-person physical exam.  This service lets us provide the care you need with a video conversation.  If a prescription is necessary we can send it directly to your pharmacy.  If lab work is needed we can place an order for that and you can then stop by our lab to have the test done at a later time.    If during the course of the call the physician/provider feels a video visit is not appropriate, you will not be charged for this service.\"     Patient confirmed that they are in [place] for today's visit yes.      Video-Visit Details  Type of service:  Video Visit    Video Start Time: 11:05  Video End Time:  11:24    Originating Location (pt. Location): Home    Distant Location (provider location):  [facility] [place]     Platform used: [employer]            GASTROENTEROLOGY Video Follow up visit    CC/REFERRING MD:    Evelina Rodriguez    HISTORY OF PRESENT ILLNESS:    Litzy Hatch is a 21 year old female who is being evaluated via a billable video visit.  Last GI clinic appt was 12/9/2020.    Since appt:  Inflammatory markers not elevated  IgA found to be significantly depressed - referred to Immunology for evaluation and discussion  Has had consultation with Endocrinology for autoimmune thyroid disease  The patient used a 6 hour at-home breath testing kit (ordered via PCP) " and was found to be positive for SIBO. She had one course of abx x 14d and was placed on a low FODMAP diet and felt better.  2 weeks later had wisdom teeth removed.  Due to low IgA, a diagnosis of celiac disease using serological markers is difficult.  She is avoiding EGD due to concerns regarding her symptoms on a gluten challenge.  States about a month ago, felt great.    Has been working days only as opposed to mixed with nights and feels this helps symptoms  3 weeks ago discovered she is pregnant.  Has a new PCP       I have reviewed and updated the patient's Past Medical History, Social History, Family History and Medication List.    ALLERGIES  Gluten meal    PE/  Gen: pleasant NAD  HEENT: hearing intact  Psych: AOx3, normal mood and affect    PERTINENT STUDIES have been reviewed.      Impression/Recommendations:  Litzy Hatch is a 21 year old female who presents for follow-up of nausea, abdominal pains, bloating in the context of life events over the last 1-2 years.  Since last appt she has been dx'd with IgA deficiency, SIBO, autoimmune thyroid disease and she is now pregnant.  She prefers to avoid EGD for evaluation as she is gluten free and does not want to undertake gluten challenge.  Overall she feels she is doing well.  RTC 1 year, reconsider EGD for complete eval    Appointment duration: 19 minutes      Thank you for this consultation.  It was a pleasure to participate in the care of this patient; please contact us with any further questions.                Again, thank you for allowing me to participate in the care of your patient.        Sincerely,        Luh John MD

## 2021-06-23 NOTE — PROGRESS NOTES
"Litzy Hatch is a 21 year old female who is being evaluated via a billable video visit.      The patient has been notified of following:     \"This video visit will be conducted via a call between you and your physician/provider. We have found that certain health care needs can be provided without the need for an in-person physical exam.  This service lets us provide the care you need with a video conversation.  If a prescription is necessary we can send it directly to your pharmacy.  If lab work is needed we can place an order for that and you can then stop by our lab to have the test done at a later time.    If during the course of the call the physician/provider feels a video visit is not appropriate, you will not be charged for this service.\"     Patient confirmed that they are in [place] for today's visit yes.      Video-Visit Details  Type of service:  Video Visit    Video Start Time: 11:05  Video End Time:  11:24    Originating Location (pt. Location): [place]    Distant Location (provider location):  [facility] [place]     Platform used: [employer]            GASTROENTEROLOGY Video Follow up visit    CC/REFERRING MD:    Evelina Rodriguez    HISTORY OF PRESENT ILLNESS:    Litzy Hatch is a 21 year old female who is being evaluated via a billable video visit.  Last GI clinic appt was 12/9/2020.    Since appt:  Inflammatory markers not elevated  IgA found to be significantly depressed - referred to Immunology for evaluation and discussion  Has had consultation with Endocrinology for autoimmune thyroid disease  The patient used a 6 hour at-home breath testing kit (ordered via PCP) and was found to be positive for SIBO. She had one course of abx x 14d and was placed on a low FODMAP diet and felt better.  2 weeks later had wisdom teeth removed.  Due to low IgA, a diagnosis of celiac disease using serological markers is difficult.  She is avoiding EGD due to concerns " regarding her symptoms on a gluten challenge.  States about a month ago, felt great.    Has been working days only as opposed to mixed with nights and feels this helps symptoms  3 weeks ago discovered she is pregnant.  Has a new PCP       I have reviewed and updated the patient's Past Medical History, Social History, Family History and Medication List.    ALLERGIES  Gluten meal    PE/  Gen: pleasant NAD  HEENT: hearing intact  Psych: AOx3, normal mood and affect    PERTINENT STUDIES have been reviewed.      Impression/Recommendations:  Litzy Hatch is a 21 year old female who presents for follow-up of nausea, abdominal pains, bloating in the context of life events over the last 1-2 years.  Since last appt she has been dx'd with IgA deficiency, SIBO, autoimmune thyroid disease and she is now pregnant.  She prefers to avoid EGD for evaluation as she is gluten free and does not want to undertake gluten challenge.  Overall she feels she is doing well.  RTC 1 year, reconsider EGD for complete eval    Appointment duration: 19 minutes      Thank you for this consultation.  It was a pleasure to participate in the care of this patient; please contact us with any further questions.

## 2021-06-23 NOTE — NURSING NOTE
Chief Complaint   Patient presents with     Follow Up       Vitals:    06/23/21 1017   Weight: 61.7 kg (136 lb)       Body mass index is 19.8 kg/m .    Michelle Gonzalez CMA

## 2021-06-24 ENCOUNTER — MYC MEDICAL ADVICE (OUTPATIENT)
Dept: OBGYN | Facility: CLINIC | Age: 21
End: 2021-06-24

## 2021-06-24 DIAGNOSIS — R11.2 NAUSEA AND VOMITING, INTRACTABILITY OF VOMITING NOT SPECIFIED, UNSPECIFIED VOMITING TYPE: Primary | ICD-10-CM

## 2021-06-25 RX ORDER — ONDANSETRON 4 MG/1
4 TABLET, ORALLY DISINTEGRATING ORAL EVERY 6 HOURS PRN
Qty: 30 TABLET | Refills: 1 | Status: ON HOLD | OUTPATIENT
Start: 2021-06-25 | End: 2021-11-02

## 2021-06-25 NOTE — TELEPHONE ENCOUNTER
Zofran prescription was sent.  Please let patient know we can always do Phenergan suppositories if this is not helpful    Thanks  Deepa Franco MD

## 2021-06-25 NOTE — TELEPHONE ENCOUNTER
Patient was prescribed Reglan on 6/22. Since taking it for the past 3 days she is feeling a little better. She is vomiting 1-2 times/day compared to 4-6 times/day without the Reglan. She is still feeling pretty awful between meals, in the morning, and in the evening. She is wondering if she can try something else. Do you want to prescribe Zofran? Eugenia Brown RN

## 2021-07-01 DIAGNOSIS — E03.8 SUBCLINICAL HYPOTHYROIDISM: ICD-10-CM

## 2021-07-01 RX ORDER — LEVOTHYROXINE SODIUM 75 UG/1
75 TABLET ORAL DAILY
Qty: 60 TABLET | Refills: 0 | OUTPATIENT
Start: 2021-07-01

## 2021-07-08 ENCOUNTER — ANCILLARY PROCEDURE (OUTPATIENT)
Dept: ULTRASOUND IMAGING | Facility: CLINIC | Age: 21
End: 2021-07-08
Attending: OBSTETRICS & GYNECOLOGY
Payer: COMMERCIAL

## 2021-07-08 ENCOUNTER — OFFICE VISIT (OUTPATIENT)
Dept: FAMILY MEDICINE | Facility: CLINIC | Age: 21
End: 2021-07-08
Payer: COMMERCIAL

## 2021-07-08 ENCOUNTER — OFFICE VISIT (OUTPATIENT)
Dept: OBGYN | Facility: CLINIC | Age: 21
End: 2021-07-08
Payer: COMMERCIAL

## 2021-07-08 VITALS
WEIGHT: 134 LBS | OXYGEN SATURATION: 99 % | SYSTOLIC BLOOD PRESSURE: 108 MMHG | TEMPERATURE: 97.9 F | HEIGHT: 70 IN | DIASTOLIC BLOOD PRESSURE: 60 MMHG | HEART RATE: 71 BPM | BODY MASS INDEX: 19.18 KG/M2 | RESPIRATION RATE: 16 BRPM

## 2021-07-08 VITALS
WEIGHT: 134.2 LBS | SYSTOLIC BLOOD PRESSURE: 106 MMHG | DIASTOLIC BLOOD PRESSURE: 62 MMHG | HEART RATE: 77 BPM | BODY MASS INDEX: 19.53 KG/M2

## 2021-07-08 DIAGNOSIS — Z34.01 ENCOUNTER FOR SUPERVISION OF NORMAL FIRST PREGNANCY IN FIRST TRIMESTER: ICD-10-CM

## 2021-07-08 DIAGNOSIS — Z34.00 ENCOUNTER FOR SUPERVISION OF NORMAL FIRST PREGNANCY: ICD-10-CM

## 2021-07-08 DIAGNOSIS — E03.8 SUBCLINICAL HYPOTHYROIDISM: ICD-10-CM

## 2021-07-08 DIAGNOSIS — O21.9 NAUSEA AND VOMITING IN PREGNANCY: Primary | ICD-10-CM

## 2021-07-08 LAB
ALBUMIN UR-MCNC: NEGATIVE MG/DL
APPEARANCE UR: CLEAR
BILIRUB UR QL STRIP: NEGATIVE
COLOR UR AUTO: YELLOW
ERYTHROCYTE [DISTWIDTH] IN BLOOD BY AUTOMATED COUNT: 13.4 % (ref 10–15)
GLUCOSE UR STRIP-MCNC: NEGATIVE MG/DL
HCT VFR BLD AUTO: 34 % (ref 35–47)
HGB BLD-MCNC: 11.5 G/DL (ref 11.7–15.7)
HGB UR QL STRIP: NEGATIVE
KETONES UR STRIP-MCNC: NEGATIVE MG/DL
LEUKOCYTE ESTERASE UR QL STRIP: NEGATIVE
MCH RBC QN AUTO: 29.1 PG (ref 26.5–33)
MCHC RBC AUTO-ENTMCNC: 33.8 G/DL (ref 31.5–36.5)
MCV RBC AUTO: 86 FL (ref 78–100)
NITRATE UR QL: NEGATIVE
PH UR STRIP: 6.5 PH (ref 5–7)
PLATELET # BLD AUTO: 312 10E9/L (ref 150–450)
RBC # BLD AUTO: 3.95 10E12/L (ref 3.8–5.2)
SOURCE: NORMAL
SP GR UR STRIP: <=1.005 (ref 1–1.03)
UROBILINOGEN UR STRIP-ACNC: 0.2 EU/DL (ref 0.2–1)
WBC # BLD AUTO: 8.3 10E9/L (ref 4–11)

## 2021-07-08 PROCEDURE — 86850 RBC ANTIBODY SCREEN: CPT | Performed by: OBSTETRICS & GYNECOLOGY

## 2021-07-08 PROCEDURE — 86900 BLOOD TYPING SEROLOGIC ABO: CPT | Performed by: OBSTETRICS & GYNECOLOGY

## 2021-07-08 PROCEDURE — 76801 OB US < 14 WKS SINGLE FETUS: CPT | Performed by: OBSTETRICS & GYNECOLOGY

## 2021-07-08 PROCEDURE — 87340 HEPATITIS B SURFACE AG IA: CPT | Performed by: OBSTETRICS & GYNECOLOGY

## 2021-07-08 PROCEDURE — 99212 OFFICE O/P EST SF 10 MIN: CPT | Performed by: OBSTETRICS & GYNECOLOGY

## 2021-07-08 PROCEDURE — 86780 TREPONEMA PALLIDUM: CPT | Mod: 90 | Performed by: OBSTETRICS & GYNECOLOGY

## 2021-07-08 PROCEDURE — 81003 URINALYSIS AUTO W/O SCOPE: CPT | Performed by: OBSTETRICS & GYNECOLOGY

## 2021-07-08 PROCEDURE — 87389 HIV-1 AG W/HIV-1&-2 AB AG IA: CPT | Performed by: OBSTETRICS & GYNECOLOGY

## 2021-07-08 PROCEDURE — 86762 RUBELLA ANTIBODY: CPT | Performed by: OBSTETRICS & GYNECOLOGY

## 2021-07-08 PROCEDURE — 36415 COLL VENOUS BLD VENIPUNCTURE: CPT | Performed by: OBSTETRICS & GYNECOLOGY

## 2021-07-08 PROCEDURE — 86901 BLOOD TYPING SEROLOGIC RH(D): CPT | Performed by: OBSTETRICS & GYNECOLOGY

## 2021-07-08 PROCEDURE — 85027 COMPLETE CBC AUTOMATED: CPT | Performed by: OBSTETRICS & GYNECOLOGY

## 2021-07-08 PROCEDURE — 84443 ASSAY THYROID STIM HORMONE: CPT | Performed by: OBSTETRICS & GYNECOLOGY

## 2021-07-08 PROCEDURE — 86803 HEPATITIS C AB TEST: CPT | Performed by: OBSTETRICS & GYNECOLOGY

## 2021-07-08 PROCEDURE — 99000 SPECIMEN HANDLING OFFICE-LAB: CPT | Performed by: OBSTETRICS & GYNECOLOGY

## 2021-07-08 PROCEDURE — 87086 URINE CULTURE/COLONY COUNT: CPT | Performed by: OBSTETRICS & GYNECOLOGY

## 2021-07-08 PROCEDURE — 99213 OFFICE O/P EST LOW 20 MIN: CPT | Performed by: STUDENT IN AN ORGANIZED HEALTH CARE EDUCATION/TRAINING PROGRAM

## 2021-07-08 RX ORDER — AZELAIC ACID 0.15 G/G
GEL TOPICAL
COMMUNITY
Start: 2021-06-01 | End: 2021-08-23

## 2021-07-08 RX ORDER — PRENATAL VIT/IRON FUM/FOLIC AC 27MG-0.8MG
1 TABLET ORAL DAILY
COMMUNITY

## 2021-07-08 RX ORDER — LEVOTHYROXINE SODIUM 75 UG/1
75 TABLET ORAL DAILY
Qty: 90 TABLET | Refills: 1 | Status: SHIPPED | OUTPATIENT
Start: 2021-07-08 | End: 2021-10-22

## 2021-07-08 RX ORDER — AZITHROMYCIN 250 MG/1
TABLET, FILM COATED ORAL
COMMUNITY
Start: 2021-06-22 | End: 2021-07-23

## 2021-07-08 ASSESSMENT — MIFFLIN-ST. JEOR: SCORE: 1435.13

## 2021-07-08 NOTE — PROGRESS NOTES
S; Litzy Hatch is a 21 year old  who is 9w0d based on LMP.  She is here for results of her us today.  She reports feeling much better with the antiemetics and is doing ok on that end.  Happy to see the ultrasound today.    O: /62   Pulse 77   Wt 60.9 kg (134 lb 3.2 oz)   LMP 2021 (Exact Date)   BMI 19.53 kg/m      Gen: NAD    Dating (mm/dd/yyyy):   LMP: 21                 EDC:  02/10/22  GA by LMP:         9w0d     Current Scan On:  2021             EDC:  02/10/22  GA by Current Scan:        9w0d  The calculation of the gestational age by current scan was based on CRL.     Anatomy Scan:  [place] gestation.     Biometry:  CRL                       2.3 cm                  9w0d                      Yolk Sac               2.9 mm                                                   Fetal heart activity:  Rate and rhythm is within normal limits.  Fetal heart rate: 175 bpm  Findings: Viable IUP     Maternal Structures:  Cervix: The cervix appears long and closed.  Right Ovary: Wnl  Left Ovary: Wnl     Impression:   [place] viable IUP with growth at 9w 0d (+/- 7-10 days) which is consistent with menstrual dating, EDC 02/10/2022.     Kasey Paris MD    A/P: IUP at 9w0d    We reviewed the us and confirmed of EDC of 2/10/22.  She has new ob visit scheduled with Dr. Franco, will draw new ob labs today.  Questions answered.    JAKE BONILLA MD

## 2021-07-08 NOTE — PROGRESS NOTES
"    Assessment & Plan     Subclinical hypothyroidism  Refilled.   - levothyroxine (SYNTHROID/LEVOTHROID) 75 MCG tablet  Dispense: 90 tablet; Refill: 1    Nausea and vomiting in pregnancy  Forms completed for work accommodations. Follow up with OB.       Paige Mills MD  [facility] TATO Mcghee is a 21 year old who presents for the following health issues     HPI     Patient is here today for a form/ letter for her work.    Had severe nausea and vomiting in early pregnancy around 5 to 6 weeks. Now taking Zofran with some improvement in symptoms. 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 Requesting work accomodations temporarily to work day shifts. Needing 9-11 hours sleep for night shifts. Thyroid symptoms were worse. Feeling cold, fatigue.Vomiting multiple times per day. Felt better when working during the day. Requests refill of synthroid. Working with Endocrine.      Objective    /60 (BP Location: Left arm, Patient Position: Sitting, Cuff Size: Adult Regular)   Pulse 71   Temp 97.9  F (36.6  C) (Temporal)   Resp 16   Ht 1.765 m (5' 9.5\")   Wt 60.8 kg (134 lb)   LMP 05/06/2021 (Exact Date)   SpO2 99%   BMI 19.50 kg/m    Body mass index is 19.5 kg/m .  Physical Exam   GENERAL: healthy, alert and no distress  EYES: Eyes grossly normal to inspection, PERRL and conjunctivae and sclerae normal  SKIN: no suspicious lesions or rashes  PSYCH: mentation appears normal, affect normal/bright    "

## 2021-07-09 LAB
ABO + RH BLD: NORMAL
ABO + RH BLD: NORMAL
BLD GP AB SCN SERPL QL: NORMAL
BLOOD BANK CMNT PATIENT-IMP: NORMAL
HBV SURFACE AG SERPL QL IA: NONREACTIVE
HCV AB SERPL QL IA: NONREACTIVE
HIV 1+2 AB+HIV1 P24 AG SERPL QL IA: NONREACTIVE
SPECIMEN EXP DATE BLD: NORMAL
T PALLIDUM AB SER QL: NONREACTIVE
TSH SERPL DL<=0.005 MIU/L-ACNC: 0.64 MU/L (ref 0.4–4)

## 2021-07-10 LAB
BACTERIA SPEC CULT: NO GROWTH
Lab: NORMAL
SPECIMEN SOURCE: NORMAL

## 2021-07-12 LAB — RUBV IGG SERPL IA-ACNC: 13 IU/ML

## 2021-07-22 ENCOUNTER — PRENATAL OFFICE VISIT (OUTPATIENT)
Dept: OBGYN | Facility: CLINIC | Age: 21
End: 2021-07-22
Payer: COMMERCIAL

## 2021-07-22 VITALS
DIASTOLIC BLOOD PRESSURE: 70 MMHG | BODY MASS INDEX: 18.96 KG/M2 | HEIGHT: 70 IN | SYSTOLIC BLOOD PRESSURE: 102 MMHG | WEIGHT: 132.4 LBS

## 2021-07-22 DIAGNOSIS — L70.9 ACNE, UNSPECIFIED ACNE TYPE: ICD-10-CM

## 2021-07-22 DIAGNOSIS — Z34.00 SUPERVISION OF NORMAL FIRST PREGNANCY, ANTEPARTUM: Primary | ICD-10-CM

## 2021-07-22 PROCEDURE — 99207 PR FIRST OB VISIT: CPT | Performed by: OBSTETRICS & GYNECOLOGY

## 2021-07-22 RX ORDER — CLINDAMYCIN PHOSPHATE 11.9 MG/ML
SOLUTION TOPICAL 2 TIMES DAILY
Qty: 30 ML | Refills: 3 | Status: SHIPPED | OUTPATIENT
Start: 2021-07-22 | End: 2021-11-15

## 2021-07-22 ASSESSMENT — MIFFLIN-ST. JEOR: SCORE: 1427.87
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
## 2021-08-05 ENCOUNTER — PRENATAL OFFICE VISIT (OUTPATIENT)
Dept: MIDWIFE SERVICES | Facility: CLINIC | Age: 21
End: 2021-08-05
Payer: COMMERCIAL

## 2021-08-05 VITALS
DIASTOLIC BLOOD PRESSURE: 68 MMHG | HEART RATE: 78 BPM | TEMPERATURE: 98.6 F | BODY MASS INDEX: 19.07 KG/M2 | SYSTOLIC BLOOD PRESSURE: 107 MMHG | WEIGHT: 131 LBS

## 2021-08-05 DIAGNOSIS — Z34.02 ENCOUNTER FOR SUPERVISION OF NORMAL FIRST PREGNANCY IN SECOND TRIMESTER: Primary | ICD-10-CM

## 2021-08-05 DIAGNOSIS — E06.3 HYPOTHYROIDISM DUE TO HASHIMOTO'S THYROIDITIS: ICD-10-CM

## 2021-08-05 DIAGNOSIS — E56.9 VITAMIN DEFICIENCY: ICD-10-CM

## 2021-08-05 PROCEDURE — 82306 VITAMIN D 25 HYDROXY: CPT | Performed by: ADVANCED PRACTICE MIDWIFE

## 2021-08-05 PROCEDURE — 84436 ASSAY OF TOTAL THYROXINE: CPT | Performed by: ADVANCED PRACTICE MIDWIFE

## 2021-08-05 PROCEDURE — 84443 ASSAY THYROID STIM HORMONE: CPT | Performed by: ADVANCED PRACTICE MIDWIFE

## 2021-08-05 PROCEDURE — 36415 COLL VENOUS BLD VENIPUNCTURE: CPT | Performed by: ADVANCED PRACTICE MIDWIFE

## 2021-08-05 PROCEDURE — 99207 PR PRENATAL VISIT: CPT | Performed by: ADVANCED PRACTICE MIDWIFE

## 2021-08-05 NOTE — PROGRESS NOTES
13w0d  Patient is feeling well. Denies any vaginal bleeding, water leaking, abdominal pain, or other concerns.   Still feeling pretty bad. Having a few good days recently but followed by more bad days. Taking Zofran and Phenergan suppository daily. Taking Colace for constipation. Discussed hydration as much as possible with nausea. Vitamin D and Thyroid labs today. Good questions about midwifery care versus MD care. Looking into vaccinations, had a few bad reactions to vaccinations prior to [place]. Will figure out plan for both COVID and Tdap vaccination. Also doing some testing for food sensitivities as well. Discussed genetic testing. Unsure about testing. May consider NIPT testing, may also consider just waiting for the ultrasound and then pursing testing if anatomy scan is abnormal. Would not change pregnancy outcome for them if anything abnormal came up. No other questions or concerns today.   Danger signs discussed. Patient knows when to call triage and has numbers to call.   Follow up in 4 weeks.   Elvia Castillo CNM

## 2021-08-06 LAB
DEPRECATED CALCIDIOL+CALCIFEROL SERPL-MC: 71 UG/L (ref 20–75)
T4 SERPL-MCNC: 20.7 UG/DL (ref 4.5–13.9)

## 2021-08-07 LAB — TSH SERPL DL<=0.005 MIU/L-ACNC: 1.61 MU/L (ref 0.4–4)

## 2021-08-23 ENCOUNTER — VIRTUAL VISIT (OUTPATIENT)
Dept: FAMILY MEDICINE | Facility: CLINIC | Age: 21
End: 2021-08-23
Payer: COMMERCIAL

## 2021-08-23 DIAGNOSIS — E06.3 HASHIMOTO'S THYROIDITIS: ICD-10-CM

## 2021-08-23 DIAGNOSIS — Z33.1 PREGNANT STATE, INCIDENTAL: ICD-10-CM

## 2021-08-23 DIAGNOSIS — L70.0 ACNE VULGARIS: Primary | ICD-10-CM

## 2021-08-23 PROCEDURE — 99213 OFFICE O/P EST LOW 20 MIN: CPT | Mod: 95 | Performed by: FAMILY MEDICINE

## 2021-08-23 NOTE — PROGRESS NOTES
Litzy is a 21 year old who is being evaluated via a billable video visit.      How would you like to obtain your AVS? MyChart  If the video visit is dropped, the invitation should be resent by: Text to cell phone: [phone]  Will anyone else be joining your video visit? No      Video Start Time: 8:01 AM    Assessment & Plan     Acne vulgaris  Agree with eliminating food sensitivities/ sugar.  Consider topical probiotics.    Hashimoto's thyroiditis  TSH well controlled on replacement.  Continue f/u with endocrine.    Pregnant state, incidental-  Stay well hydrated.  Discuss iron replacement with OB.  Anxiety should be well controlled in 2nd trimester.  Be aware of post-partum mood changes.    Patient Instructions   For vitamin D supplement:    To order supplements go to the web site: [employer]  Use my authorization number to order the recommended supplements: S99     I would stay on the same dose as you have been taking as our recent level look good.    [email or web address] - probiotics for skin to help with acne                    Return if symptoms worsen or fail to improve.    Brittani Chin MD  [facility]   Litzy is a 21 year old who presents for the following health issues     HPI She is 15 weeks pregnant with h/o anxiety/ Hashimoto's hypothyroidism/ SIBO :    1) Pregnancy- Initially had some nausea /vomiting but that resolved.  Her diet was poor but now is better.  Taking PNV.  Has felt a little lightheaded lately.    2) Acne- Has flared up.  Saw dermatology and was started on erythromycin but that did not help.  Switched to topical clindamycin and little better.  Did ALCAT testing and sensitive to sugar/ gliadin / casein so modifying her diet.  Eating more fruits/ veggies.    3) Anxiety - Doing much better now.  Increased her magnesium.  Had to stop rhodiola/ L-theanine which were helping.    4) Hypothyroidism - She has been seeing endocrine and her TSH has  come down from 3.84 earlier this summer to 1.61 recently.  She is taking levothyroxine.          Review of Systems         Objective           Vitals:  No vitals were obtained today due to virtual visit.    Physical Exam   GENERAL: Healthy, alert and no distress  EYES: Eyes grossly normal to inspection.  No discharge or erythema, or obvious scleral/conjunctival abnormalities.  RESP: No audible wheeze, cough, or visible cyanosis.  No visible retractions or increased work of breathing.    SKIN: Visible skin clear. No significant rash, abnormal pigmentation or lesions.  NEURO: Cranial nerves grossly intact.  Mentation and speech appropriate for age.  PSYCH: Mentation appears normal, affect normal/bright, judgement and insight intact, normal speech and appearance well-groomed.    TSH - 1.61  Vitamin D-71  HgB - 11.5            Video-Visit Details    Type of service:  Video Visit    Video End Time:8:24 AM    Originating Location (pt. Location): Home    Distant Location (provider location):  [facility]     Platform used for Video Visit: [facility]

## 2021-08-25 ENCOUNTER — APPOINTMENT (OUTPATIENT)
Dept: URBAN - METROPOLITAN AREA CLINIC 260 | Age: 21
Setting detail: DERMATOLOGY
End: 2021-08-27

## 2021-08-25 VITALS — WEIGHT: 135 LBS | HEIGHT: 68 IN | RESPIRATION RATE: 17 BRPM

## 2021-08-25 DIAGNOSIS — L70.0 ACNE VULGARIS: ICD-10-CM

## 2021-08-25 PROCEDURE — OTHER MEDICATION COUNSELING: OTHER

## 2021-08-25 PROCEDURE — 99214 OFFICE O/P EST MOD 30 MIN: CPT

## 2021-08-25 PROCEDURE — OTHER PRESCRIPTION: OTHER

## 2021-08-25 PROCEDURE — OTHER PRESCRIPTION MEDICATION MANAGEMENT: OTHER

## 2021-08-25 PROCEDURE — OTHER COUNSELING: OTHER

## 2021-08-25 RX ORDER — CEPHALEXIN 500 MG/1
500MG CAPSULE ORAL BID
Qty: 60 | Refills: 2 | Status: ERX | COMMUNITY
Start: 2021-08-25

## 2021-08-25 ASSESSMENT — LOCATION SIMPLE DESCRIPTION DERM
LOCATION SIMPLE: RIGHT CHEEK
LOCATION SIMPLE: LEFT CHEEK

## 2021-08-25 ASSESSMENT — LOCATION DETAILED DESCRIPTION DERM
LOCATION DETAILED: LEFT INFERIOR CENTRAL MALAR CHEEK
LOCATION DETAILED: RIGHT INFERIOR CENTRAL MALAR CHEEK

## 2021-08-25 ASSESSMENT — SEVERITY ASSESSMENT OVERALL AMONG ALL PATIENTS
IN YOUR EXPERIENCE, AMONG ALL PATIENTS YOU HAVE SEEN WITH THIS CONDITION, HOW SEVERE IS THIS PATIENT'S CONDITION?: MULTIPLE INFLAMMATORY LESIONS BUT NONINFLAMMATORY LESIONS PREDOMINATE

## 2021-08-25 ASSESSMENT — LOCATION ZONE DERM: LOCATION ZONE: FACE

## 2021-08-25 NOTE — PROCEDURE: PRESCRIPTION MEDICATION MANAGEMENT
Initiate Treatment: Cephalexin 500mg bid
Detail Level: Simple
Plan: OTC retinol product would be safe to use during pregnancy. BP wash is also safe.  If all goes well and under control plan to see patient after baby is born.
Render In Strict Bullet Format?: No

## 2021-08-25 NOTE — PROCEDURE: COUNSELING
Spironolactone Pregnancy And Lactation Text: This medication can cause feminization of the male fetus and should be avoided during pregnancy. The active metabolite is also found in breast milk.
Topical Retinoid counseling:  Patient advised to apply a pea-sized amount only at bedtime and wait 30 minutes after washing their face before applying.  If too drying, patient may add a non-comedogenic moisturizer. The patient verbalized understanding of the proper use and possible adverse effects of retinoids.  All of the patient's questions and concerns were addressed.
High Dose Vitamin A Pregnancy And Lactation Text: High dose vitamin A therapy is contraindicated during pregnancy and breast feeding.
Doxycycline Counseling:  Patient counseled regarding possible photosensitivity and increased risk for sunburn.  Patient instructed to avoid sunlight, if possible.  When exposed to sunlight, patients should wear protective clothing, sunglasses, and sunscreen.  The patient was instructed to call the office immediately if the following severe adverse effects occur:  hearing changes, easy bruising/bleeding, severe headache, or vision changes.  The patient verbalized understanding of the proper use and possible adverse effects of doxycycline.  All of the patient's questions and concerns were addressed.
Dapsone Counseling: I discussed with the patient the risks of dapsone including but not limited to hemolytic anemia, agranulocytosis, rashes, methemoglobinemia, kidney failure, peripheral neuropathy, headaches, GI upset, and liver toxicity.  Patients who start dapsone require monitoring including baseline LFTs and weekly CBCs for the first month, then every month thereafter.  The patient verbalized understanding of the proper use and possible adverse effects of dapsone.  All of the patient's questions and concerns were addressed.
Isotretinoin Counseling: Patient should get monthly blood tests, not donate blood, not drive at night if vision affected, not share medication, and not undergo elective surgery for 6 months after tx completed. Side effects reviewed, pt to contact office should one occur.
Tazorac Counseling:  Patient advised that medication is irritating and drying.  Patient may need to apply sparingly and wash off after an hour before eventually leaving it on overnight.  The patient verbalized understanding of the proper use and possible adverse effects of tazorac.  All of the patient's questions and concerns were addressed.
Birth Control Pills Counseling: Birth Control Pill Counseling: I discussed with the patient the potential side effects of OCPs including but not limited to increased risk of stroke, heart attack, thrombophlebitis, deep venous thrombosis, hepatic adenomas, breast changes, GI upset, headaches, and depression.  The patient verbalized understanding of the proper use and possible adverse effects of OCPs. All of the patient's questions and concerns were addressed.
Topical Sulfur Applications Counseling: Topical Sulfur Counseling: Patient counseled that this medication may cause skin irritation or allergic reactions.  In the event of skin irritation, the patient was advised to reduce the amount of the drug applied or use it less frequently.   The patient verbalized understanding of the proper use and possible adverse effects of topical sulfur application.  All of the patient's questions and concerns were addressed.
Sarecycline Pregnancy And Lactation Text: This medication is Pregnancy Category D and not consider safe during pregnancy. It is also excreted in breast milk.
Dapsone Pregnancy And Lactation Text: This medication is Pregnancy Category C and is not considered safe during pregnancy or breast feeding.
Include Pregnancy/Lactation Warning?: No
Doxycycline Pregnancy And Lactation Text: This medication is Pregnancy Category D and not consider safe during pregnancy. It is also excreted in breast milk but is considered safe for shorter treatment courses.
Detail Level: Zone
Minocycline Counseling: Patient advised regarding possible photosensitivity and discoloration of the teeth, skin, lips, tongue and gums.  Patient instructed to avoid sunlight, if possible.  When exposed to sunlight, patients should wear protective clothing, sunglasses, and sunscreen.  The patient was instructed to call the office immediately if the following severe adverse effects occur:  hearing changes, easy bruising/bleeding, severe headache, or vision changes.  The patient verbalized understanding of the proper use and possible adverse effects of minocycline.  All of the patient's questions and concerns were addressed.
Erythromycin Pregnancy And Lactation Text: This medication is Pregnancy Category B and is considered safe during pregnancy. It is also excreted in breast milk.
Tetracycline Counseling: Patient counseled regarding possible photosensitivity and increased risk for sunburn.  Patient instructed to avoid sunlight, if possible.  When exposed to sunlight, patients should wear protective clothing, sunglasses, and sunscreen.  The patient was instructed to call the office immediately if the following severe adverse effects occur:  hearing changes, easy bruising/bleeding, severe headache, or vision changes.  The patient verbalized understanding of the proper use and possible adverse effects of tetracycline.  All of the patient's questions and concerns were addressed. Patient understands to avoid pregnancy while on therapy due to potential birth defects.
Birth Control Pills Pregnancy And Lactation Text: This medication should be avoided if pregnant and for the first 30 days post-partum.
Topical Retinoid Pregnancy And Lactation Text: This medication is Pregnancy Category C. It is unknown if this medication is excreted in breast milk.
Topical Clindamycin Pregnancy And Lactation Text: This medication is Pregnancy Category B and is considered safe during pregnancy. It is unknown if it is excreted in breast milk.
Tazorac Pregnancy And Lactation Text: This medication is not safe during pregnancy. It is unknown if this medication is excreted in breast milk.
Sarecycline Counseling: Patient advised regarding possible photosensitivity and discoloration of the teeth, skin, lips, tongue and gums.  Patient instructed to avoid sunlight, if possible.  When exposed to sunlight, patients should wear protective clothing, sunglasses, and sunscreen.  The patient was instructed to call the office immediately if the following severe adverse effects occur:  hearing changes, easy bruising/bleeding, severe headache, or vision changes.  The patient verbalized understanding of the proper use and possible adverse effects of sarecycline.  All of the patient's questions and concerns were addressed.
Benzoyl Peroxide Counseling: Patient counseled that medicine may cause skin irritation and bleach clothing.  In the event of skin irritation, the patient was advised to reduce the amount of the drug applied or use it less frequently.   The patient verbalized understanding of the proper use and possible adverse effects of benzoyl peroxide.  All of the patient's questions and concerns were addressed.
Azithromycin Pregnancy And Lactation Text: This medication is considered safe during pregnancy and is also secreted in breast milk.
Spironolactone Counseling: Patient advised regarding risks of diarrhea, abdominal pain, hyperkalemia, birth defects (for female patients), liver toxicity and renal toxicity. The patient may need blood work to monitor liver and kidney function and potassium levels while on therapy. The patient verbalized understanding of the proper use and possible adverse effects of spironolactone.  All of the patient's questions and concerns were addressed.
Benzoyl Peroxide Pregnancy And Lactation Text: This medication is Pregnancy Category C. It is unknown if benzoyl peroxide is excreted in breast milk.
Topical Sulfur Applications Pregnancy And Lactation Text: This medication is Pregnancy Category C and has an unknown safety profile during pregnancy. It is unknown if this topical medication is excreted in breast milk.
Azithromycin Counseling:  I discussed with the patient the risks of azithromycin including but not limited to GI upset, allergic reaction, drug rash, diarrhea, and yeast infections.
Topical Clindamycin Counseling: Patient counseled that this medication may cause skin irritation or allergic reactions.  In the event of skin irritation, the patient was advised to reduce the amount of the drug applied or use it less frequently.   The patient verbalized understanding of the proper use and possible adverse effects of clindamycin.  All of the patient's questions and concerns were addressed.
Erythromycin Counseling:  I discussed with the patient the risks of erythromycin including but not limited to GI upset, allergic reaction, drug rash, diarrhea, increase in liver enzymes, and yeast infections.
Isotretinoin Pregnancy And Lactation Text: This medication is Pregnancy Category X and is considered extremely dangerous during pregnancy. It is unknown if it is excreted in breast milk.
Bactrim Counseling:  I discussed with the patient the risks of sulfa antibiotics including but not limited to GI upset, allergic reaction, drug rash, diarrhea, dizziness, photosensitivity, and yeast infections.  Rarely, more serious reactions can occur including but not limited to aplastic anemia, agranulocytosis, methemoglobinemia, blood dyscrasias, liver or kidney failure, lung infiltrates or desquamative/blistering drug rashes.
High Dose Vitamin A Counseling: Side effects reviewed, pt to contact office should one occur.
Bactrim Pregnancy And Lactation Text: This medication is Pregnancy Category D and is known to cause fetal risk.  It is also excreted in breast milk.

## 2021-08-25 NOTE — PROCEDURE: MEDICATION COUNSELING
18-Jul-2017 11:01 Nsaids Pregnancy And Lactation Text: These medications are considered safe up to 30 weeks gestation. It is excreted in breast milk.

## 2021-09-10 ENCOUNTER — PRENATAL OFFICE VISIT (OUTPATIENT)
Dept: MIDWIFE SERVICES | Facility: CLINIC | Age: 21
End: 2021-09-10
Payer: COMMERCIAL

## 2021-09-10 ENCOUNTER — LAB (OUTPATIENT)
Dept: LAB | Facility: CLINIC | Age: 21
End: 2021-09-10

## 2021-09-10 VITALS — DIASTOLIC BLOOD PRESSURE: 57 MMHG | WEIGHT: 136 LBS | SYSTOLIC BLOOD PRESSURE: 98 MMHG | BODY MASS INDEX: 19.8 KG/M2

## 2021-09-10 DIAGNOSIS — E03.8 SUBCLINICAL HYPOTHYROIDISM: ICD-10-CM

## 2021-09-10 DIAGNOSIS — L70.0 ACNE VULGARIS: ICD-10-CM

## 2021-09-10 DIAGNOSIS — R76.8 ANTI-TPO ANTIBODIES PRESENT: ICD-10-CM

## 2021-09-10 DIAGNOSIS — Z34.02 ENCOUNTER FOR SUPERVISION OF NORMAL FIRST PREGNANCY IN SECOND TRIMESTER: Primary | ICD-10-CM

## 2021-09-10 DIAGNOSIS — L70.0 ACNE VULGARIS: Primary | ICD-10-CM

## 2021-09-10 LAB — T4 SERPL-MCNC: 16.2 UG/DL (ref 4.5–13.9)

## 2021-09-10 PROCEDURE — 84630 ASSAY OF ZINC: CPT | Mod: 90 | Performed by: ADVANCED PRACTICE MIDWIFE

## 2021-09-10 PROCEDURE — 82542 COL CHROMOTOGRAPHY QUAL/QUAN: CPT | Mod: 90 | Performed by: ADVANCED PRACTICE MIDWIFE

## 2021-09-10 PROCEDURE — 84590 ASSAY OF VITAMIN A: CPT | Mod: 90 | Performed by: ADVANCED PRACTICE MIDWIFE

## 2021-09-10 PROCEDURE — 84436 ASSAY OF TOTAL THYROXINE: CPT | Performed by: ADVANCED PRACTICE MIDWIFE

## 2021-09-10 PROCEDURE — 36415 COLL VENOUS BLD VENIPUNCTURE: CPT | Performed by: ADVANCED PRACTICE MIDWIFE

## 2021-09-10 PROCEDURE — 84443 ASSAY THYROID STIM HORMONE: CPT | Performed by: ADVANCED PRACTICE MIDWIFE

## 2021-09-10 PROCEDURE — 99207 PR PRENATAL VISIT: CPT | Performed by: ADVANCED PRACTICE MIDWIFE

## 2021-09-10 PROCEDURE — 84446 ASSAY OF VITAMIN E: CPT | Mod: 90 | Performed by: ADVANCED PRACTICE MIDWIFE

## 2021-09-10 NOTE — PROGRESS NOTES
Feeling well, no concerns. Noted a new pain in inner thigh, sounds like related to fetal position. Discussed questions about doulas, pediatrician. Starting to feel fetal movement. No cramping, contractions, LOF or bleeding. Has fetal survey scheduled in two weeks with CNM visit to follow. MML

## 2021-09-11 LAB — TSH SERPL DL<=0.005 MIU/L-ACNC: 1.33 MU/L (ref 0.4–4)

## 2021-09-15 LAB
A-TOCOPHEROL VIT E SERPL-MCNC: 12.5 MG/L
ANNOTATION COMMENT IMP: NORMAL
BETA+GAMMA TOCOPHEROL SERPL-MCNC: 0.4 MG/L
RETINYL PALMITATE SERPL-MCNC: 0.06 MG/L
VIT A SERPL-MCNC: 0.53 MG/L
ZINC SERPL-MCNC: 61.4 UG/DL

## 2021-09-17 LAB
A-LINOLENATE SERPL-SCNC: 122 NMOL/ML (ref 50–130)
AA SERPL-SCNC: 1714 NMOL/ML (ref 520–1490)
ARACHIDATE SERPL-SCNC: 33 NMOL/ML (ref 50–90)
CLINICAL BIOCHEMIST REVIEW: ABNORMAL
DHA SERPL-SCNC: 208 NMOL/ML (ref 30–250)
DOCOSAPENTAENATE W6 SERPL-SCNC: 39 NMOL/ML (ref 10–70)
DOCOSATETRAENOATE SERPL-SCNC: 35 NMOL/ML (ref 10–80)
DOCOSENOATE SERPL-SCNC: 5 NMOL/ML (ref 4–13)
DPA SERPL-SCNC: 55 NMOL/ML (ref 20–210)
EPA SERPL-SCNC: 67 NMOL/ML (ref 14–100)
FA SERPL-SCNC: 14.9 MMOL/L (ref 7.3–16.8)
G-LINOLENATE SERPL-SCNC: 34 NMOL/ML (ref 16–150)
HEXADECENOATE SERPL-SCNC: 47 NMOL/ML (ref 25–105)
HOMO-G LINOLENATE SERPL-SCNC: 254 NMOL/ML (ref 50–250)
LAURATE SERPL-SCNC: 36 NMOL/ML (ref 6–90)
LINOLEATE SERPL-SCNC: 4458 NMOL/ML (ref 2270–3850)
MEAD ACID SERPL-SCNC: 36 NMOL/ML (ref 7–30)
MONOUNSAT FA SERPL-SCNC: 3.2 MMOL/L (ref 1.3–5.8)
MYRISTATE SERPL-SCNC: 216 NMOL/ML (ref 30–450)
NERVONATE SERPL-SCNC: 99 NMOL/ML (ref 60–100)
OCTADECANOATE SERPL-SCNC: 916 NMOL/ML (ref 590–1170)
OLEATE SERPL-SCNC: 2593 NMOL/ML (ref 650–3500)
PALMITATE SERPL-SCNC: 3327 NMOL/ML (ref 1480–3730)
PALMITOLEATE SERPL-SCNC: 171 NMOL/ML (ref 110–1130)
POLYUNSAT FA SERPL-SCNC: 7 MMOL/L (ref 3.2–5.8)
SAT FA SERPL-SCNC: 4.7 MMOL/L (ref 2.5–5.5)
TRIENOATE/AA SERPL-SRTO: 0.02 {RATIO} (ref 0.01–0.04)
VACCENATE SERPL-SCNC: 246 NMOL/ML (ref 280–740)
W3 FA SERPL-SCNC: 0.5 MMOL/L (ref 0.2–0.5)
W6 FA SERPL-SCNC: 6.5 MMOL/L (ref 3–5.4)

## 2021-09-21 ENCOUNTER — OFFICE VISIT (OUTPATIENT)
Dept: MIDWIFE SERVICES | Facility: CLINIC | Age: 21
End: 2021-09-21
Attending: OBSTETRICS & GYNECOLOGY
Payer: COMMERCIAL

## 2021-09-21 ENCOUNTER — ANCILLARY PROCEDURE (OUTPATIENT)
Dept: ULTRASOUND IMAGING | Facility: CLINIC | Age: 21
End: 2021-09-21
Attending: OBSTETRICS & GYNECOLOGY
Payer: COMMERCIAL

## 2021-09-21 VITALS
DIASTOLIC BLOOD PRESSURE: 64 MMHG | OXYGEN SATURATION: 100 % | BODY MASS INDEX: 19.47 KG/M2 | HEIGHT: 70 IN | HEART RATE: 77 BPM | WEIGHT: 136 LBS | SYSTOLIC BLOOD PRESSURE: 93 MMHG

## 2021-09-21 DIAGNOSIS — Z34.02 ENCOUNTER FOR SUPERVISION OF NORMAL FIRST PREGNANCY IN SECOND TRIMESTER: Primary | ICD-10-CM

## 2021-09-21 DIAGNOSIS — Z34.00 SUPERVISION OF NORMAL FIRST PREGNANCY, ANTEPARTUM: ICD-10-CM

## 2021-09-21 PROCEDURE — 99207 PR PRENATAL VISIT: CPT | Performed by: ADVANCED PRACTICE MIDWIFE

## 2021-09-21 PROCEDURE — 76805 OB US >/= 14 WKS SNGL FETUS: CPT | Performed by: OBSTETRICS & GYNECOLOGY

## 2021-09-21 ASSESSMENT — MIFFLIN-ST. JEOR: SCORE: 1444.2

## 2021-09-21 NOTE — PROGRESS NOTES
19w5d  Litzy is here with her partner after fetal survey. Preliminary result shows JESSICA consistent with LMP dating. Unable to visualize nose/lips. Discussed recheck in 2-3 weeks to see anatomy not visualized today. She is wondering if we have dye-free glucola? Discussed if not available, she could choose 2 weeks of QID BG checks. She has food sensitivities, so is reluctant to drink the glucola with dye. Plan to recheck U/S in 2-3w, then GCT/hgb 4 weeks after.   Cong Alexis CNM

## 2021-09-22 ENCOUNTER — MYC MEDICAL ADVICE (OUTPATIENT)
Dept: MIDWIFE SERVICES | Facility: CLINIC | Age: 21
End: 2021-09-22

## 2021-09-22 NOTE — TELEPHONE ENCOUNTER
Routing to on-call CNM. Pt wondering about acne medication. Pt is 19w6d.   Eugenia House, RN-BSN

## 2021-09-23 ENCOUNTER — TELEPHONE (OUTPATIENT)
Dept: MIDWIFE SERVICES | Facility: CLINIC | Age: 21
End: 2021-09-23

## 2021-09-23 NOTE — TELEPHONE ENCOUNTER
Pt is concerned about glucose test drink options as she is allergic to several food dyes. Is there any dye in the lemon-lime flavor, or is there a alternative that we could have ready for her?  Her GTT is scheduled for 10/21

## 2021-09-24 NOTE — TELEPHONE ENCOUNTER
Discussed with lab. Free of all dyes. TC to patient. Left message for patient.   Eugenia House RN-BSN

## 2021-10-21 ENCOUNTER — PRENATAL OFFICE VISIT (OUTPATIENT)
Dept: MIDWIFE SERVICES | Facility: CLINIC | Age: 21
End: 2021-10-21
Attending: ADVANCED PRACTICE MIDWIFE
Payer: COMMERCIAL

## 2021-10-21 ENCOUNTER — ANCILLARY PROCEDURE (OUTPATIENT)
Dept: ULTRASOUND IMAGING | Facility: CLINIC | Age: 21
End: 2021-10-21
Attending: ADVANCED PRACTICE MIDWIFE
Payer: COMMERCIAL

## 2021-10-21 VITALS
WEIGHT: 145 LBS | DIASTOLIC BLOOD PRESSURE: 61 MMHG | OXYGEN SATURATION: 99 % | SYSTOLIC BLOOD PRESSURE: 100 MMHG | HEART RATE: 89 BPM | BODY MASS INDEX: 21.11 KG/M2

## 2021-10-21 DIAGNOSIS — Z34.02 ENCOUNTER FOR SUPERVISION OF NORMAL FIRST PREGNANCY IN SECOND TRIMESTER: ICD-10-CM

## 2021-10-21 DIAGNOSIS — D50.0 IRON DEFICIENCY ANEMIA DUE TO CHRONIC BLOOD LOSS: ICD-10-CM

## 2021-10-21 DIAGNOSIS — E03.8 SUBCLINICAL HYPOTHYROIDISM: ICD-10-CM

## 2021-10-21 DIAGNOSIS — D64.9 LOW HEMOGLOBIN: ICD-10-CM

## 2021-10-21 DIAGNOSIS — D80.2 IGA DEFICIENCY (H): ICD-10-CM

## 2021-10-21 DIAGNOSIS — E55.9 VITAMIN D DEFICIENCY: ICD-10-CM

## 2021-10-21 DIAGNOSIS — D50.8 IRON DEFICIENCY ANEMIA SECONDARY TO INADEQUATE DIETARY IRON INTAKE: ICD-10-CM

## 2021-10-21 DIAGNOSIS — E55.9 VITAMIN D DEFICIENCY: Primary | ICD-10-CM

## 2021-10-21 DIAGNOSIS — R76.8 ANTI-TPO ANTIBODIES PRESENT: ICD-10-CM

## 2021-10-21 DIAGNOSIS — L70.0 ACNE VULGARIS: ICD-10-CM

## 2021-10-21 LAB
GLUCOSE 1H P 50 G GLC PO SERPL-MCNC: 79 MG/DL (ref 70–129)
HGB BLD-MCNC: 10.8 G/DL (ref 11.7–15.7)

## 2021-10-21 PROCEDURE — 83735 ASSAY OF MAGNESIUM: CPT | Mod: 90 | Performed by: ADVANCED PRACTICE MIDWIFE

## 2021-10-21 PROCEDURE — 76816 OB US FOLLOW-UP PER FETUS: CPT | Performed by: OBSTETRICS & GYNECOLOGY

## 2021-10-21 PROCEDURE — 84443 ASSAY THYROID STIM HORMONE: CPT | Performed by: ADVANCED PRACTICE MIDWIFE

## 2021-10-21 PROCEDURE — 84436 ASSAY OF TOTAL THYROXINE: CPT | Performed by: ADVANCED PRACTICE MIDWIFE

## 2021-10-21 PROCEDURE — 85027 COMPLETE CBC AUTOMATED: CPT | Performed by: ADVANCED PRACTICE MIDWIFE

## 2021-10-21 PROCEDURE — 36415 COLL VENOUS BLD VENIPUNCTURE: CPT | Performed by: ADVANCED PRACTICE MIDWIFE

## 2021-10-21 PROCEDURE — 86769 SARS-COV-2 COVID-19 ANTIBODY: CPT | Mod: 90 | Performed by: ADVANCED PRACTICE MIDWIFE

## 2021-10-21 PROCEDURE — 99000 SPECIMEN HANDLING OFFICE-LAB: CPT | Performed by: ADVANCED PRACTICE MIDWIFE

## 2021-10-21 PROCEDURE — 99207 PR PRENATAL VISIT: CPT | Performed by: ADVANCED PRACTICE MIDWIFE

## 2021-10-21 PROCEDURE — 82306 VITAMIN D 25 HYDROXY: CPT | Performed by: ADVANCED PRACTICE MIDWIFE

## 2021-10-21 PROCEDURE — 82728 ASSAY OF FERRITIN: CPT | Performed by: ADVANCED PRACTICE MIDWIFE

## 2021-10-21 PROCEDURE — 83550 IRON BINDING TEST: CPT | Performed by: ADVANCED PRACTICE MIDWIFE

## 2021-10-21 PROCEDURE — 82952 GTT-ADDED SAMPLES: CPT | Performed by: ADVANCED PRACTICE MIDWIFE

## 2021-10-21 NOTE — PROGRESS NOTES
24w0d   Had f/u U/S today, prelim result = able to visualize remainder of survey and WNL.  GCt and hgb today.  Questions about vaccines.  Has IgA deficiency, has a previous hx of having a fever of 104 with Tdap vaccine.  Consult sent to [facility] regarding recommendations for flu, Tdap and covid vaccines in 2nd and 3rd trimester.  RTC 4 wks LISA FunesM

## 2021-10-22 ENCOUNTER — TELEPHONE (OUTPATIENT)
Dept: MIDWIFE SERVICES | Facility: CLINIC | Age: 21
End: 2021-10-22

## 2021-10-22 DIAGNOSIS — D64.9 LOW HEMOGLOBIN: Primary | ICD-10-CM

## 2021-10-22 PROBLEM — D50.0 IRON DEFICIENCY ANEMIA DUE TO CHRONIC BLOOD LOSS: Status: ACTIVE | Noted: 2021-10-22

## 2021-10-22 LAB
DEPRECATED CALCIDIOL+CALCIFEROL SERPL-MC: 67 UG/L (ref 20–75)
ERYTHROCYTE [DISTWIDTH] IN BLOOD BY AUTOMATED COUNT: 14.6 % (ref 10–15)
HCT VFR BLD AUTO: 33 % (ref 35–47)
HGB BLD-MCNC: 10.8 G/DL (ref 11.7–15.7)
MCH RBC QN AUTO: 30.8 PG (ref 26.5–33)
MCHC RBC AUTO-ENTMCNC: 32.7 G/DL (ref 31.5–36.5)
MCV RBC AUTO: 94 FL (ref 78–100)
PLATELET # BLD AUTO: 296 10E3/UL (ref 150–450)
RBC # BLD AUTO: 3.51 10E6/UL (ref 3.8–5.2)
T4 SERPL-MCNC: 18.6 UG/DL (ref 4.5–13.9)
WBC # BLD AUTO: 9.6 10E3/UL (ref 4–11)

## 2021-10-22 RX ORDER — ASCORBIC ACID 250 MG
250 TABLET,CHEWABLE ORAL
Qty: 60 TABLET | Refills: 3 | Status: ON HOLD | OUTPATIENT
Start: 2021-10-22 | End: 2022-02-05

## 2021-10-22 RX ORDER — FERROUS GLUCONATE 324(38)MG
324 TABLET ORAL
Qty: 60 TABLET | Refills: 3 | Status: ON HOLD | OUTPATIENT
Start: 2021-10-22 | End: 2022-02-05

## 2021-10-22 RX ORDER — LANOLIN ALCOHOL/MO/W.PET/CERES
1000 CREAM (GRAM) TOPICAL
Qty: 60 TABLET | Refills: 3 | Status: ON HOLD | OUTPATIENT
Start: 2021-10-22 | End: 2022-02-05

## 2021-10-23 LAB
FERRITIN SERPL-MCNC: 8 NG/ML (ref 12–150)
IRON SATN MFR SERPL: 26 % (ref 15–46)
IRON SERPL-MCNC: 114 UG/DL (ref 35–180)
TIBC SERPL-MCNC: 441 UG/DL (ref 240–430)
TSH SERPL DL<=0.005 MIU/L-ACNC: 0.86 MU/L (ref 0.4–4)

## 2021-10-25 LAB
MAGNESIUM RBC-SCNC: 4.5 MG/DL
SARS-COV-2 AB SERPL IA-ACNC: 208 U/ML
SARS-COV-2 AB SERPL-IMP: POSITIVE

## 2021-10-26 PROBLEM — D50.8 IRON DEFICIENCY ANEMIA SECONDARY TO INADEQUATE DIETARY IRON INTAKE: Status: ACTIVE | Noted: 2021-10-22

## 2021-10-27 ENCOUNTER — MYC MEDICAL ADVICE (OUTPATIENT)
Dept: MIDWIFE SERVICES | Facility: CLINIC | Age: 21
End: 2021-10-27

## 2021-10-27 RX ORDER — BREAST PUMP
1 EACH MISCELLANEOUS DAILY
Qty: 1 EACH | Refills: 0 | Status: SHIPPED | OUTPATIENT
Start: 2021-10-27 | End: 2022-01-13

## 2021-11-02 ENCOUNTER — E-CONSULT (OUTPATIENT)
Dept: NEUROLOGY | Facility: CLINIC | Age: 21
End: 2021-11-02
Payer: COMMERCIAL

## 2021-11-02 ENCOUNTER — TELEPHONE (OUTPATIENT)
Dept: MIDWIFE SERVICES | Facility: CLINIC | Age: 21
End: 2021-11-02

## 2021-11-02 ENCOUNTER — HOSPITAL ENCOUNTER (OUTPATIENT)
Facility: CLINIC | Age: 21
End: 2021-11-02
Admitting: ADVANCED PRACTICE MIDWIFE
Payer: COMMERCIAL

## 2021-11-02 ENCOUNTER — MYC MEDICAL ADVICE (OUTPATIENT)
Dept: MIDWIFE SERVICES | Facility: CLINIC | Age: 21
End: 2021-11-02
Payer: COMMERCIAL

## 2021-11-02 ENCOUNTER — HOSPITAL ENCOUNTER (OUTPATIENT)
Facility: CLINIC | Age: 21
Discharge: HOME | End: 2021-11-02
Attending: ADVANCED PRACTICE MIDWIFE | Admitting: ADVANCED PRACTICE MIDWIFE
Payer: COMMERCIAL

## 2021-11-02 VITALS
BODY MASS INDEX: 21.22 KG/M2 | RESPIRATION RATE: 18 BRPM | DIASTOLIC BLOOD PRESSURE: 56 MMHG | TEMPERATURE: 98.4 F | WEIGHT: 140 LBS | SYSTOLIC BLOOD PRESSURE: 101 MMHG | HEART RATE: 80 BPM | HEIGHT: 68 IN

## 2021-11-02 DIAGNOSIS — G44.209 TENSION HEADACHE: Primary | ICD-10-CM

## 2021-11-02 DIAGNOSIS — G44.209 TENSION TYPE HEADACHE: Primary | ICD-10-CM

## 2021-11-02 DIAGNOSIS — G44.52 NEW DAILY PERSISTENT HEADACHE: Primary | ICD-10-CM

## 2021-11-02 PROBLEM — Z01.30 BLOOD PRESSURE CHECK: Status: ACTIVE | Noted: 2021-11-02

## 2021-11-02 LAB
ALT SERPL W P-5'-P-CCNC: 20 U/L (ref 0–50)
AST SERPL W P-5'-P-CCNC: 18 U/L (ref 0–45)
CREAT SERPL-MCNC: 0.46 MG/DL (ref 0.52–1.04)
CREAT UR-MCNC: 30 MG/DL
ERYTHROCYTE [DISTWIDTH] IN BLOOD BY AUTOMATED COUNT: 14.2 % (ref 10–15)
GFR SERPL CREATININE-BSD FRML MDRD: >90 ML/MIN/1.73M2
HCT VFR BLD AUTO: 31.1 % (ref 35–47)
HGB BLD-MCNC: 10.3 G/DL (ref 11.7–15.7)
MCH RBC QN AUTO: 30.5 PG (ref 26.5–33)
MCHC RBC AUTO-ENTMCNC: 33.1 G/DL (ref 31.5–36.5)
MCV RBC AUTO: 92 FL (ref 78–100)
PLATELET # BLD AUTO: 292 10E3/UL (ref 150–450)
PROT UR-MCNC: <0.05 G/L
PROT/CREAT 24H UR: NORMAL MG/G{CREAT}
RBC # BLD AUTO: 3.38 10E6/UL (ref 3.8–5.2)
WBC # BLD AUTO: 10.1 10E3/UL (ref 4–11)

## 2021-11-02 PROCEDURE — 84450 TRANSFERASE (AST) (SGOT): CPT | Performed by: ADVANCED PRACTICE MIDWIFE

## 2021-11-02 PROCEDURE — 258N000003 HC RX IP 258 OP 636: Performed by: ADVANCED PRACTICE MIDWIFE

## 2021-11-02 PROCEDURE — 59025 FETAL NON-STRESS TEST: CPT

## 2021-11-02 PROCEDURE — 96360 HYDRATION IV INFUSION INIT: CPT

## 2021-11-02 PROCEDURE — 99213 OFFICE O/P EST LOW 20 MIN: CPT | Mod: 25 | Performed by: ADVANCED PRACTICE MIDWIFE

## 2021-11-02 PROCEDURE — 82565 ASSAY OF CREATININE: CPT | Performed by: ADVANCED PRACTICE MIDWIFE

## 2021-11-02 PROCEDURE — 96374 THER/PROPH/DIAG INJ IV PUSH: CPT

## 2021-11-02 PROCEDURE — 85027 COMPLETE CBC AUTOMATED: CPT | Performed by: ADVANCED PRACTICE MIDWIFE

## 2021-11-02 PROCEDURE — 999N000105 HC STATISTIC NO DOCUMENTATION TO SUPPORT CHARGE

## 2021-11-02 PROCEDURE — 250N000011 HC RX IP 250 OP 636: Performed by: ADVANCED PRACTICE MIDWIFE

## 2021-11-02 PROCEDURE — 84460 ALANINE AMINO (ALT) (SGPT): CPT | Performed by: ADVANCED PRACTICE MIDWIFE

## 2021-11-02 PROCEDURE — 99207 PR NO BILLABLE SERVICE THIS VISIT: CPT | Performed by: PSYCHIATRY & NEUROLOGY

## 2021-11-02 PROCEDURE — 84156 ASSAY OF PROTEIN URINE: CPT | Performed by: ADVANCED PRACTICE MIDWIFE

## 2021-11-02 PROCEDURE — 59025 FETAL NON-STRESS TEST: CPT | Mod: 26 | Performed by: ADVANCED PRACTICE MIDWIFE

## 2021-11-02 PROCEDURE — G0463 HOSPITAL OUTPT CLINIC VISIT: HCPCS | Mod: 25

## 2021-11-02 RX ORDER — ONDANSETRON 2 MG/ML
4 INJECTION INTRAMUSCULAR; INTRAVENOUS EVERY 6 HOURS PRN
Status: DISCONTINUED | OUTPATIENT
Start: 2021-11-02 | End: 2021-11-02 | Stop reason: HOSPADM

## 2021-11-02 RX ORDER — SODIUM CHLORIDE, SODIUM LACTATE, POTASSIUM CHLORIDE, CALCIUM CHLORIDE 600; 310; 30; 20 MG/100ML; MG/100ML; MG/100ML; MG/100ML
INJECTION, SOLUTION INTRAVENOUS CONTINUOUS
Status: DISCONTINUED | OUTPATIENT
Start: 2021-11-02 | End: 2021-11-02 | Stop reason: HOSPADM

## 2021-11-02 RX ORDER — PROCHLORPERAZINE MALEATE 10 MG
10 TABLET ORAL EVERY 6 HOURS PRN
Status: DISCONTINUED | OUTPATIENT
Start: 2021-11-02 | End: 2021-11-02 | Stop reason: HOSPADM

## 2021-11-02 RX ORDER — METOCLOPRAMIDE 10 MG/1
10 TABLET ORAL EVERY 6 HOURS PRN
Status: DISCONTINUED | OUTPATIENT
Start: 2021-11-02 | End: 2021-11-02 | Stop reason: HOSPADM

## 2021-11-02 RX ORDER — ONDANSETRON 4 MG/1
4 TABLET, ORALLY DISINTEGRATING ORAL EVERY 6 HOURS PRN
Status: DISCONTINUED | OUTPATIENT
Start: 2021-11-02 | End: 2021-11-02 | Stop reason: HOSPADM

## 2021-11-02 RX ORDER — PROCHLORPERAZINE 25 MG
25 SUPPOSITORY, RECTAL RECTAL EVERY 12 HOURS PRN
Status: DISCONTINUED | OUTPATIENT
Start: 2021-11-02 | End: 2021-11-02 | Stop reason: HOSPADM

## 2021-11-02 RX ORDER — METOCLOPRAMIDE HYDROCHLORIDE 5 MG/ML
10 INJECTION INTRAMUSCULAR; INTRAVENOUS EVERY 6 HOURS PRN
Status: DISCONTINUED | OUTPATIENT
Start: 2021-11-02 | End: 2021-11-02 | Stop reason: HOSPADM

## 2021-11-02 RX ADMIN — METOCLOPRAMIDE HYDROCHLORIDE 10 MG: 5 INJECTION INTRAMUSCULAR; INTRAVENOUS at 19:09

## 2021-11-02 RX ADMIN — SODIUM CHLORIDE, POTASSIUM CHLORIDE, SODIUM LACTATE AND CALCIUM CHLORIDE 1000 ML: 600; 310; 30; 20 INJECTION, SOLUTION INTRAVENOUS at 19:06

## 2021-11-02 ASSESSMENT — MIFFLIN-ST. JEOR: SCORE: 1438.54

## 2021-11-02 ASSESSMENT — ACTIVITIES OF DAILY LIVING (ADL): FALL_HISTORY_WITHIN_LAST_SIX_MONTHS: NO

## 2021-11-02 NOTE — CONFIDENTIAL NOTE
TC to patient to discuss headaches. She has a history of caffeine headaches prior to pregnancy but they feel different than these headaches. In the past they are bilateral and resolve over time with interventions.   Starting Saturday (4 days ago) she developed a headache that occurred behind her left eye. It was a sharp pain, 6-7/10. That day she lost vision in her right eye for about 5 minutes. She was unable to concentrate or read out of her right eye. It was like looking at the sun and vision turning white. She felt loopy and just out of herself. Tylenol is helping with her pain, taking it down to about 1-3/10. She was at work Saturday so was able to take her blood pressure and it was normal. No history of blood pressure concerns. Sunday she was sore behind her eye where the headache occurred. It flared up once, she took tylenol and again it help but did not completely resolve. Monday and today she woke up with the headache. Lasts for about 30 minutes before being mild again. Last night she woke up to adjust her position and felt the headache, felt like a zing from the back of her skull to the left eye.  She has been wearing her glasses to make sure to help with any vision reasons for the headaches. Feels like her vision has not changed that would cause migraines. She does note a severe concussion about 6 years ago. Has not really had issues since. She has been drinking 100-120 oz of water a day to try to stay hydrated. Has tried caffeine for the headache as well. No history of migraines.   Discussed pre-eclampsia is less likely at this stage of pregnancy and with her history. Blood pressure was normal with headaches. Offered BP in clinic anytime if she feels she needs it. Discussed urgent neurology consult/visit would be ideal. Gave her the number for a few clinic to try and get into soon. Would like her to be seen in the next day or two. We discussed for immediate assessment the ER is the best place to go.    She is also having frequent leg cramps, has been nightly lately. Taking Magnesium 300 mg. Plans to increase that to 350 mg. Discussed epsom salt baths, hydration with things like coconut water and mineral water to help increase her potassium intake as well. Eating magnesium and potassium rich foods. Discussed increasing hydration a little. At this stage of pregnancy people can feel another shift in fluid. More palpations etc. Even with good hydration she may still be slightly dehydrated. Will try and increase fluids slightly with mineral rich fluids.   Will call if unable to get into neurology quickly, would like to avoid having to send her to the ER for evaluation of able. Knows that is the best place for her if needed though. No other questions or concerns at this time.   LISA Farooq CNM

## 2021-11-02 NOTE — PROGRESS NOTES
"ALL SMARTFIELDS MUST BE COMPLETED FOR PATIENT CARE AND BILLING    11/2/2021     E-Consult has been denied due to: Doesn't meet criteria for E-Consult.    Interprofessional consultation requested by:  Elvia Castillo APRN CNM      Clinical Question/Purpose: MY CLINICAL QUESTION IS: Patient is pregnant and having severe headaches.     Patient assessment and information reviewed:     Recommendations:   E consult is denied for the following reasons:  1. There is no actual question asked  2. The patient requires a consult for headache management as you note there are 2 \"red flags\" vision loss and pregnancy. She was referred for such an appointment and declined to schedule. E-consults should not be placed to expedite referrals nor do they substitute for actual consults.         The recommendations provided in this E-Consult are based on the clinical data available to me in the medical record, and are furnished without the benefit of a comprehensive in-person or virtual patient evaluation.  This consultation should not replace the clinical judgement and evaluation of the provider ordering this E-Consult. Any new clinical issues, or changes in patient status since the filing of this E-Consult will need to be taken into account when assessing these recommendations. Please contact me if you have further questions.    My total time spent reviewing clinical information and formulating assessment was 5 minutes.    Report sent automatically to requesting provider once signed.     Kasey England MD  "
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
## 2021-11-03 ENCOUNTER — HOSPITAL ENCOUNTER (OUTPATIENT)
Dept: MRI IMAGING | Facility: CLINIC | Age: 21
Discharge: HOME | End: 2021-11-03
Attending: ADVANCED PRACTICE MIDWIFE | Admitting: ADVANCED PRACTICE MIDWIFE
Payer: COMMERCIAL

## 2021-11-03 DIAGNOSIS — G44.52 NEW DAILY PERSISTENT HEADACHE: ICD-10-CM

## 2021-11-03 PROCEDURE — 70551 MRI BRAIN STEM W/O DYE: CPT | Mod: 26 | Performed by: RADIOLOGY

## 2021-11-03 PROCEDURE — 70551 MRI BRAIN STEM W/O DYE: CPT
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
## 2021-11-03 NOTE — DISCHARGE INSTRUCTIONS
Discharge Instructions for Undelivered Patients    Diet:  * Drink 8 to 12 glasses of liquids (milk, juice, water) every day  * You may eat meals and snacks.    Activity:  ** Call your doctor or nurse midwife if your baby is moving less than usual.    Call your provider if you notice:  * Swelling in your face or increased swelling in your hands or legs.  * Headaches that are not relieved by Tylenol (acetaminophen).  * Changes in your vision (blurring; seeing spots or stars).  * Nausea (sick to your stomach) and vomiting (throwing up).  * Weight gain of 5 pounds per week.  * Heartburn that doesn't go away.  * Signs of bladder infection: Pain when you urinate (use the toilet), needing to go more often or more urgently.  * The bag of evans (membrane) breaks, or you notice leaking in your underwear.  * Bright red blood in your underwear.  * Abdominal (lower belly) or stomach pain.  * For first baby: Contractions (tightenings) less than 5 minutes apart for one hour or more.  * Second (plus) baby: Contractions (tightenings) less than 10 minutes apart and getting stronger.  * Increase or change in vaginal discharge (note the color and amount).

## 2021-11-03 NOTE — PLAN OF CARE
Pt discharge home ambulating with  at 2030. States LR bolus and Reglan helped. Discharge instructions given.

## 2021-11-04 NOTE — TELEPHONE ENCOUNTER
FUTURE VISIT INFORMATION      FUTURE VISIT INFORMATION:    Date: 11/5/2021    Time: 10am    Location: [facility]  REFERRAL INFORMATION:    Referring provider:  Elvia Castillo    Referring providers clinic:  [facility]     Reason for visit/diagnosis  Headaches     RECORDS REQUESTED FROM:       Clinic name Comments Records Status Imaging Status   Internal LAURIE Castillo-11/2/2021    MR Brain-11/3/2021 Epic PACS

## 2021-11-05 ENCOUNTER — PRE VISIT (OUTPATIENT)
Dept: NEUROLOGY | Facility: CLINIC | Age: 21
End: 2021-11-05

## 2021-11-05 ENCOUNTER — TELEPHONE (OUTPATIENT)
Dept: OPHTHALMOLOGY | Facility: CLINIC | Age: 21
End: 2021-11-05

## 2021-11-05 ENCOUNTER — OFFICE VISIT (OUTPATIENT)
Dept: NEUROLOGY | Facility: CLINIC | Age: 21
End: 2021-11-05
Attending: ADVANCED PRACTICE MIDWIFE
Payer: COMMERCIAL

## 2021-11-05 VITALS
SYSTOLIC BLOOD PRESSURE: 103 MMHG | OXYGEN SATURATION: 98 % | DIASTOLIC BLOOD PRESSURE: 71 MMHG | RESPIRATION RATE: 16 BRPM | HEART RATE: 89 BPM

## 2021-11-05 DIAGNOSIS — G43.109 MIGRAINE WITH AURA AND WITHOUT STATUS MIGRAINOSUS, NOT INTRACTABLE: Primary | ICD-10-CM

## 2021-11-05 DIAGNOSIS — G44.209 TENSION HEADACHE: ICD-10-CM

## 2021-11-05 PROCEDURE — 99214 OFFICE O/P EST MOD 30 MIN: CPT | Performed by: PSYCHIATRY & NEUROLOGY

## 2021-11-05 RX ORDER — SUMATRIPTAN 100 MG/1
100 TABLET, FILM COATED ORAL
Qty: 18 TABLET | Refills: 3 | Status: ON HOLD | OUTPATIENT
Start: 2021-11-05 | End: 2022-02-05

## 2021-11-05 RX ORDER — AZELAIC ACID 0.15 G/G
GEL TOPICAL
COMMUNITY
Start: 2021-10-21 | End: 2023-12-19

## 2021-11-05 ASSESSMENT — ENCOUNTER SYMPTOMS
MYALGIAS: 0
HYPOTENSION: 0
MUSCLE CRAMPS: 0
INSOMNIA: 1
EYE PAIN: 1
BACK PAIN: 0
MUSCLE WEAKNESS: 0
JOINT SWELLING: 0
HYPERTENSION: 0
SYNCOPE: 0
PANIC: 0
PALPITATIONS: 1
DEPRESSION: 0
DOUBLE VISION: 0
ORTHOPNEA: 0
EYE IRRITATION: 0
NECK PAIN: 1
DECREASED CONCENTRATION: 0
EXERCISE INTOLERANCE: 0
LIGHT-HEADEDNESS: 0
NERVOUS/ANXIOUS: 1
LEG PAIN: 0
EYE WATERING: 0
SLEEP DISTURBANCES DUE TO BREATHING: 0
EYE REDNESS: 0
ARTHRALGIAS: 0
STIFFNESS: 0

## 2021-11-05 ASSESSMENT — HEADACHE IMPACT TEST (HIT 6)
HIT6 TOTAL SCORE: 61
WHEN YOU HAVE A HEADACHE HOW OFTEN DO YOU WISH YOU COULD LIE DOWN: SOMETIMES
HOW OFTEN DO HEADACHES LIMIT YOUR DAILY ACTIVITIES: SOMETIMES
HOW OFTEN HAVE YOU FELT TOO TIRED TO WORK BECAUSE OF YOUR HEADACHES: SOMETIMES
WHEN YOU HAVE HEADACHES HOW OFTEN IS THE PAIN SEVERE: SOMETIMES
HOW OFTEN DID HEADACHS LIMIT CONCENTRATION ON WORK OR DAILY ACTIVITY: SOMETIMES
HOW OFTEN HAVE YOU FELT FED UP OR IRRITATED BECAUSE OF YOUR HEADACHES: VERY OFTEN

## 2021-11-05 ASSESSMENT — PAIN SCALES - GENERAL: PAINLEVEL: MODERATE PAIN (4)

## 2021-11-05 NOTE — TELEPHONE ENCOUNTER
Called and spoke to Litzy in regards to an appt with one of our general providers / Optometrists. Litzy wanted a sooner appt; I made her an appt with Dr. Hyde for 11/ 9 to rule out papilledema. I verbally gave Litzy the address to the clinic.     Gina Roth Communication Facilitator on 11/5/2021 at 12:57 PM

## 2021-11-05 NOTE — NURSING NOTE
Chief Complaint   Patient presents with     Consult     [facility] New Headache - Tension Headache     Donovan Rubin

## 2021-11-05 NOTE — TELEPHONE ENCOUNTER
[facility] Call Center    Phone Message    May a detailed message be left on voicemail: yes     Reason for Call: Appointment Intake    Referring Provider Name: NEO MEADE  Diagnosis and/or Symptoms: 21F pregnant (26 weeks) with new headaches, evaluate for papilledema    Protocols indicates that Dx requires clinic review. Please follow-up with pt to schedule.       Action Taken: Other:  EYE    Travel Screening: Not Applicable
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
## 2021-11-06 PROBLEM — Z01.30 BLOOD PRESSURE CHECK: Status: RESOLVED | Noted: 2021-11-02 | Resolved: 2021-11-06

## 2021-11-15 DIAGNOSIS — L70.9 ACNE, UNSPECIFIED ACNE TYPE: ICD-10-CM

## 2021-11-15 RX ORDER — CLINDAMYCIN PHOSPHATE 11.9 MG/ML
SOLUTION TOPICAL 2 TIMES DAILY
Qty: 30 ML | Refills: 3 | Status: SHIPPED | OUTPATIENT
Start: 2021-11-15 | End: 2022-05-05

## 2021-11-15 NOTE — TELEPHONE ENCOUNTER
Refill request for clindamycin phosphate, last prescribed 7/2021. Refilled today per protocol.   Serenity Blood RN

## 2021-11-16 ENCOUNTER — HOSPITAL ENCOUNTER (OUTPATIENT)
Dept: MRI IMAGING | Facility: CLINIC | Age: 21
Discharge: HOME | End: 2021-11-16
Attending: PSYCHIATRY & NEUROLOGY | Admitting: PSYCHIATRY & NEUROLOGY
Payer: COMMERCIAL

## 2021-11-16 DIAGNOSIS — G43.109 MIGRAINE WITH AURA AND WITHOUT STATUS MIGRAINOSUS, NOT INTRACTABLE: ICD-10-CM

## 2021-11-16 PROCEDURE — 70544 MR ANGIOGRAPHY HEAD W/O DYE: CPT

## 2021-11-16 PROCEDURE — 70544 MR ANGIOGRAPHY HEAD W/O DYE: CPT | Mod: 26 | Performed by: RADIOLOGY

## 2021-11-17 ENCOUNTER — HOSPITAL ENCOUNTER (OUTPATIENT)
Facility: CLINIC | Age: 21
End: 2021-11-17
Payer: COMMERCIAL

## 2021-12-05 ENCOUNTER — HEALTH MAINTENANCE LETTER (OUTPATIENT)
Age: 21
End: 2021-12-05

## 2021-12-09 ENCOUNTER — PRENATAL OFFICE VISIT (OUTPATIENT)
Dept: MIDWIFE SERVICES | Facility: CLINIC | Age: 21
End: 2021-12-09
Payer: COMMERCIAL

## 2021-12-09 VITALS
WEIGHT: 151.7 LBS | DIASTOLIC BLOOD PRESSURE: 65 MMHG | SYSTOLIC BLOOD PRESSURE: 96 MMHG | HEART RATE: 81 BPM | BODY MASS INDEX: 23.07 KG/M2 | OXYGEN SATURATION: 98 %

## 2021-12-09 DIAGNOSIS — Z34.03 ENCOUNTER FOR SUPERVISION OF NORMAL FIRST PREGNANCY IN THIRD TRIMESTER: Primary | ICD-10-CM

## 2021-12-09 LAB
DEPRECATED CALCIDIOL+CALCIFEROL SERPL-MC: 58 UG/L (ref 20–75)
ERYTHROCYTE [DISTWIDTH] IN BLOOD BY AUTOMATED COUNT: 14.2 % (ref 10–15)
HCT VFR BLD AUTO: 35.4 % (ref 35–47)
HGB BLD-MCNC: 11.4 G/DL (ref 11.7–15.7)
MCH RBC QN AUTO: 30.4 PG (ref 26.5–33)
MCHC RBC AUTO-ENTMCNC: 32.2 G/DL (ref 31.5–36.5)
MCV RBC AUTO: 94 FL (ref 78–100)
PLATELET # BLD AUTO: 292 10E3/UL (ref 150–450)
RBC # BLD AUTO: 3.75 10E6/UL (ref 3.8–5.2)
WBC # BLD AUTO: 8.7 10E3/UL (ref 4–11)

## 2021-12-09 PROCEDURE — 82306 VITAMIN D 25 HYDROXY: CPT | Performed by: ADVANCED PRACTICE MIDWIFE

## 2021-12-09 PROCEDURE — 36415 COLL VENOUS BLD VENIPUNCTURE: CPT | Performed by: ADVANCED PRACTICE MIDWIFE

## 2021-12-09 PROCEDURE — 85027 COMPLETE CBC AUTOMATED: CPT | Performed by: ADVANCED PRACTICE MIDWIFE

## 2021-12-09 PROCEDURE — 99207 PR PRENATAL VISIT: CPT | Performed by: ADVANCED PRACTICE MIDWIFE

## 2021-12-09 PROCEDURE — 84443 ASSAY THYROID STIM HORMONE: CPT | Performed by: ADVANCED PRACTICE MIDWIFE

## 2021-12-09 NOTE — PROGRESS NOTES
31w0d  Patient is feeling well. Positive fetal movement. Denies water leaking, vaginal bleeding, decreased fetal movement, contraction pain, or headaches.   Doing well. Feeling a lot better at this stage of pregnancy. Headaches have been manageable, not as frequent. Taking unisom to help with sleep, staying well hydrated, taking magnesium, and seeing the chiropractor to help. Getting heartburn, taking tums, feels that is sufficient for now. Discussed when to switch to pepcid if needed. Having some right hip pain, has been present since 12 weeks. Wondering if that will resolve once the pregnancy is over. Discussed most likely and if not we can connect her with physical therapy. Discussed she is welcome to start that during pregnancy if it is uncomfortable. Done working at 36 weeks, may consider after that. Her work schedule is too crazy now to try and fit something in. Uses the maternity support belt if needed which does help. Would like to check thyroid and vitamin D today. Taking 75 mcg of synthroid, was following and getting labs with endocrine but they have not met up in awhile so wondering if she can get that drawn today. Also needs iron drawn today. Taking iron every other day, sometimes oral tablets and sometimes Floradix. No other questions or concerns today.   Danger signs reviewed, pre-eclampsia signs and symptoms discussed.   Knows when to call triage and has phone numbers.   Follow up in 2 weeks.   LISA Farooq CNM

## 2021-12-11 LAB — TSH SERPL DL<=0.005 MIU/L-ACNC: 1.58 MU/L (ref 0.4–4)

## 2021-12-14 ENCOUNTER — MYC MEDICAL ADVICE (OUTPATIENT)
Dept: MIDWIFE SERVICES | Facility: CLINIC | Age: 21
End: 2021-12-14
Payer: COMMERCIAL

## 2021-12-14 DIAGNOSIS — Z34.03 ENCOUNTER FOR SUPERVISION OF NORMAL FIRST PREGNANCY IN THIRD TRIMESTER: Primary | ICD-10-CM

## 2021-12-14 NOTE — TELEPHONE ENCOUNTER
Patient sent [employer]oyo message requesting physical therapy referral per discussion at last appointment. Orders started. Routed to midwife.   Annamaria Starkey RN

## 2021-12-17 ENCOUNTER — OFFICE VISIT (OUTPATIENT)
Dept: OPHTHALMOLOGY | Facility: CLINIC | Age: 21
End: 2021-12-17
Attending: OPHTHALMOLOGY
Payer: COMMERCIAL

## 2021-12-17 DIAGNOSIS — H52.203 ASTIGMATISM OF BOTH EYES, UNSPECIFIED TYPE: ICD-10-CM

## 2021-12-17 DIAGNOSIS — H52.13 MYOPIA OF BOTH EYES: ICD-10-CM

## 2021-12-17 DIAGNOSIS — G43.109 MIGRAINE WITH AURA AND WITHOUT STATUS MIGRAINOSUS, NOT INTRACTABLE: Primary | ICD-10-CM

## 2021-12-17 DIAGNOSIS — Z87.820 HISTORY OF CONCUSSION: ICD-10-CM

## 2021-12-17 PROCEDURE — 99204 OFFICE O/P NEW MOD 45 MIN: CPT | Performed by: OPHTHALMOLOGY

## 2021-12-17 PROCEDURE — G0463 HOSPITAL OUTPT CLINIC VISIT: HCPCS

## 2021-12-17 PROCEDURE — 92133 CPTRZD OPH DX IMG PST SGM ON: CPT | Performed by: OPHTHALMOLOGY

## 2021-12-17 ASSESSMENT — CONF VISUAL FIELD
OS_NORMAL: 1
OD_NORMAL: 1
METHOD: COUNTING FINGERS

## 2021-12-17 ASSESSMENT — SLIT LAMP EXAM - LIDS
COMMENTS: NORMAL
COMMENTS: NORMAL

## 2021-12-17 ASSESSMENT — REFRACTION_WEARINGRX
OD_SPHERE: -2.00
OD_CYLINDER: +2.50
OS_CYLINDER: +2.50
OS_SPHERE: -1.75
OD_AXIS: 083
SPECS_TYPE: SVL
OS_AXIS: 098

## 2021-12-17 ASSESSMENT — VISUAL ACUITY
OS_CC: 20/20
CORRECTION_TYPE: CONTACTS
OD_CC: 20/20
OS_CC+: -1
METHOD: SNELLEN - LINEAR
OD_CC+: -1

## 2021-12-17 ASSESSMENT — TONOMETRY
OD_IOP_MMHG: 10
IOP_METHOD: TONOPEN
OS_IOP_MMHG: 12

## 2021-12-17 ASSESSMENT — CUP TO DISC RATIO
OS_RATIO: 0.4
OD_RATIO: 0.35

## 2021-12-17 ASSESSMENT — EXTERNAL EXAM - LEFT EYE: OS_EXAM: NORMAL

## 2021-12-17 ASSESSMENT — EXTERNAL EXAM - RIGHT EYE: OD_EXAM: NORMAL
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
## 2021-12-17 NOTE — NURSING NOTE
Chief Complaints and History of Present Illnesses   Patient presents with     Annual Eye Exam     R/O papilledema     Chief Complaint(s) and History of Present Illness(es)     Annual Eye Exam     Associated symptoms: headache, floaters and photophobia.  Negative for eye pain and flashes    Treatments tried: no treatments    Pain scale: 0/10    Comments: R/O papilledema              Comments     Pt 32 week pregnant, first baby. Developed migraines at 26 to 28 weeks   One migraine  I had an episode of vision loss for about 30 minutes, with large aura   No  headaches this week.    Jessie Odell COT 12:48 PM December 17, 2021

## 2021-12-22 ENCOUNTER — PRENATAL OFFICE VISIT (OUTPATIENT)
Dept: MIDWIFE SERVICES | Facility: CLINIC | Age: 21
End: 2021-12-22
Payer: COMMERCIAL

## 2021-12-22 VITALS
OXYGEN SATURATION: 98 % | DIASTOLIC BLOOD PRESSURE: 63 MMHG | SYSTOLIC BLOOD PRESSURE: 89 MMHG | HEIGHT: 68 IN | HEART RATE: 81 BPM | BODY MASS INDEX: 23.04 KG/M2 | WEIGHT: 152 LBS

## 2021-12-22 DIAGNOSIS — Z34.03 ENCOUNTER FOR SUPERVISION OF NORMAL FIRST PREGNANCY IN THIRD TRIMESTER: Primary | ICD-10-CM

## 2021-12-22 PROCEDURE — 99207 PR PRENATAL VISIT: CPT | Performed by: ADVANCED PRACTICE MIDWIFE

## 2021-12-22 ASSESSMENT — MIFFLIN-ST. JEOR: SCORE: 1492.97

## 2021-12-22 NOTE — PROGRESS NOTES
32w6d  Litzy is feeling well. Headaches are improved. Still some right hip pain, starting physical therapy in a few weeks. Chiro helps. Baby is active, no bleeding/LOF. Discussed q2w appts til 36w, then weekly. Interested in waterbirth. Needs education discussion.  Cong Alexis CNM

## 2022-01-04 ENCOUNTER — TELEPHONE (OUTPATIENT)
Dept: FAMILY MEDICINE | Facility: CLINIC | Age: 22
End: 2022-01-04
Payer: COMMERCIAL

## 2022-01-04 NOTE — TELEPHONE ENCOUNTER
Spoke with patient. She is looking strictly for baby at this time. Baby is not yet born. Not sure how to schedule this. Should patient call back and schedule an est care visit once baby is born or would provider like to call patient? How would Dr Hightower like to move forward with this ?

## 2022-01-04 NOTE — TELEPHONE ENCOUNTER
"Ok to schedule in person or VV for this. It would be an office visit for \"discuss pediatric care/meet and greet\"  Ok to use the 10min spots avail on Monday for 20min appt to DB into that space if needing it soon; otherwise as she's still only about 35 weeks ok to schedule out in a few weeks as schedule allows for an open spot   "

## 2022-01-04 NOTE — TELEPHONE ENCOUNTER
"Patient is calling to schedule a \"Meet and Greet\" with Dr Hightower as she is interested in having her as baby's PCP.      Patient states that she is available anytime at this point but does prefer later afternoon/ evening for her .    Please let patient know what time works as Dr Oglesby schedule is very full.     Ok to leave detailed message.    Allegra Malone   "

## 2022-01-10 ENCOUNTER — APPOINTMENT (OUTPATIENT)
Dept: ULTRASOUND IMAGING | Facility: CLINIC | Age: 22
End: 2022-01-10
Attending: MIDWIFE
Payer: COMMERCIAL

## 2022-01-10 ENCOUNTER — TELEPHONE (OUTPATIENT)
Dept: MIDWIFE SERVICES | Facility: CLINIC | Age: 22
End: 2022-01-10
Payer: COMMERCIAL

## 2022-01-10 ENCOUNTER — HOSPITAL ENCOUNTER (OUTPATIENT)
Facility: CLINIC | Age: 22
Setting detail: OBSERVATION
Discharge: HOME | End: 2022-01-11
Attending: MIDWIFE | Admitting: ADVANCED PRACTICE MIDWIFE
Payer: COMMERCIAL

## 2022-01-10 ENCOUNTER — NURSE TRIAGE (OUTPATIENT)
Dept: NURSING | Facility: CLINIC | Age: 22
End: 2022-01-10
Payer: COMMERCIAL

## 2022-01-10 PROBLEM — Z36.89 ENCOUNTER FOR TRIAGE IN PREGNANT PATIENT: Status: ACTIVE | Noted: 2022-01-10

## 2022-01-10 LAB
ABO/RH(D): NORMAL
ANTIBODY SCREEN: NEGATIVE
HGB BLD-MCNC: 11.8 G/DL (ref 11.7–15.7)
SPECIMEN EXPIRATION DATE: NORMAL

## 2022-01-10 PROCEDURE — 86901 BLOOD TYPING SEROLOGIC RH(D): CPT | Performed by: MIDWIFE

## 2022-01-10 PROCEDURE — 258N000003 HC RX IP 258 OP 636: Performed by: MIDWIFE

## 2022-01-10 PROCEDURE — 76815 OB US LIMITED FETUS(S): CPT

## 2022-01-10 PROCEDURE — 85460 HEMOGLOBIN FETAL: CPT | Performed by: MIDWIFE

## 2022-01-10 PROCEDURE — 36415 COLL VENOUS BLD VENIPUNCTURE: CPT | Performed by: MIDWIFE

## 2022-01-10 PROCEDURE — 85018 HEMOGLOBIN: CPT | Performed by: MIDWIFE

## 2022-01-10 RX ORDER — LIDOCAINE 40 MG/G
CREAM TOPICAL
Status: DISCONTINUED | OUTPATIENT
Start: 2022-01-10 | End: 2022-01-11 | Stop reason: HOSPADM

## 2022-01-10 RX ADMIN — SODIUM CHLORIDE, POTASSIUM CHLORIDE, SODIUM LACTATE AND CALCIUM CHLORIDE 1000 ML: 600; 310; 30; 20 INJECTION, SOLUTION INTRAVENOUS at 23:42

## 2022-01-10 ASSESSMENT — MIFFLIN-ST. JEOR: SCORE: 1515.65

## 2022-01-11 ENCOUNTER — APPOINTMENT (OUTPATIENT)
Dept: ULTRASOUND IMAGING | Facility: CLINIC | Age: 22
End: 2022-01-11
Attending: ADVANCED PRACTICE MIDWIFE
Payer: COMMERCIAL

## 2022-01-11 VITALS
HEART RATE: 70 BPM | HEIGHT: 68 IN | BODY MASS INDEX: 23.79 KG/M2 | TEMPERATURE: 98.3 F | RESPIRATION RATE: 16 BRPM | DIASTOLIC BLOOD PRESSURE: 56 MMHG | SYSTOLIC BLOOD PRESSURE: 103 MMHG | WEIGHT: 157 LBS
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
## 2022-01-11 LAB
APTT PPP: 27 SECONDS (ref 22–38)
ERYTHROCYTE [DISTWIDTH] IN BLOOD BY AUTOMATED COUNT: 13.7 % (ref 10–15)
FETAL RBC % LFV: 0.3 %
FETAL RBC (ML): 13 ML
FIBRINOGEN PPP-MCNC: 465 MG/DL (ref 170–490)
HCT VFR BLD AUTO: 34.2 % (ref 35–47)
HGB BLD-MCNC: 11.3 G/DL (ref 11.7–15.7)
IF INDICATED RECOMMENDED DOSE OF RH IMMUNE GLOBULIN UG: 300 UG
INR PPP: 0.9 (ref 0.85–1.15)
MCH RBC QN AUTO: 30.4 PG (ref 26.5–33)
MCHC RBC AUTO-ENTMCNC: 33 G/DL (ref 31.5–36.5)
MCV RBC AUTO: 92 FL (ref 78–100)
PLATELET # BLD AUTO: 267 10E3/UL (ref 150–450)
RBC # BLD AUTO: 3.72 10E6/UL (ref 3.8–5.2)
WBC # BLD AUTO: 7.3 10E3/UL (ref 4–11)

## 2022-01-11 PROCEDURE — 99417 PROLNG OP E/M EACH 15 MIN: CPT | Mod: 24 | Performed by: MIDWIFE

## 2022-01-11 PROCEDURE — G0378 HOSPITAL OBSERVATION PER HR: HCPCS

## 2022-01-11 PROCEDURE — 76819 FETAL BIOPHYS PROFIL W/O NST: CPT | Mod: 59

## 2022-01-11 PROCEDURE — 36415 COLL VENOUS BLD VENIPUNCTURE: CPT | Performed by: ADVANCED PRACTICE MIDWIFE

## 2022-01-11 PROCEDURE — 99215 OFFICE O/P EST HI 40 MIN: CPT | Mod: 24 | Performed by: MIDWIFE

## 2022-01-11 PROCEDURE — 85384 FIBRINOGEN ACTIVITY: CPT | Performed by: ADVANCED PRACTICE MIDWIFE

## 2022-01-11 PROCEDURE — 85610 PROTHROMBIN TIME: CPT | Performed by: ADVANCED PRACTICE MIDWIFE

## 2022-01-11 PROCEDURE — 85730 THROMBOPLASTIN TIME PARTIAL: CPT | Performed by: ADVANCED PRACTICE MIDWIFE

## 2022-01-11 PROCEDURE — 85027 COMPLETE CBC AUTOMATED: CPT | Performed by: ADVANCED PRACTICE MIDWIFE

## 2022-01-11 RX ORDER — OXYTOCIN/0.9 % SODIUM CHLORIDE 30/500 ML
100-340 PLASTIC BAG, INJECTION (ML) INTRAVENOUS CONTINUOUS PRN
Status: DISCONTINUED | OUTPATIENT
Start: 2022-01-11 | End: 2022-01-11 | Stop reason: HOSPADM

## 2022-01-11 RX ORDER — KETOROLAC TROMETHAMINE 30 MG/ML
30 INJECTION, SOLUTION INTRAMUSCULAR; INTRAVENOUS
Status: DISCONTINUED | OUTPATIENT
Start: 2022-01-11 | End: 2022-01-11 | Stop reason: HOSPADM

## 2022-01-11 RX ORDER — PROCHLORPERAZINE MALEATE 10 MG
10 TABLET ORAL EVERY 6 HOURS PRN
Status: DISCONTINUED | OUTPATIENT
Start: 2022-01-11 | End: 2022-01-11 | Stop reason: HOSPADM

## 2022-01-11 RX ORDER — CARBOPROST TROMETHAMINE 250 UG/ML
250 INJECTION, SOLUTION INTRAMUSCULAR
Status: DISCONTINUED | OUTPATIENT
Start: 2022-01-11 | End: 2022-01-11 | Stop reason: HOSPADM

## 2022-01-11 RX ORDER — OXYTOCIN 10 [USP'U]/ML
10 INJECTION, SOLUTION INTRAMUSCULAR; INTRAVENOUS
Status: DISCONTINUED | OUTPATIENT
Start: 2022-01-11 | End: 2022-01-11 | Stop reason: HOSPADM

## 2022-01-11 RX ORDER — MISOPROSTOL 200 UG/1
400 TABLET ORAL
Status: DISCONTINUED | OUTPATIENT
Start: 2022-01-11 | End: 2022-01-11 | Stop reason: HOSPADM

## 2022-01-11 RX ORDER — ONDANSETRON 2 MG/ML
4 INJECTION INTRAMUSCULAR; INTRAVENOUS EVERY 6 HOURS PRN
Status: DISCONTINUED | OUTPATIENT
Start: 2022-01-11 | End: 2022-01-11 | Stop reason: HOSPADM

## 2022-01-11 RX ORDER — METOCLOPRAMIDE HYDROCHLORIDE 5 MG/ML
10 INJECTION INTRAMUSCULAR; INTRAVENOUS EVERY 6 HOURS PRN
Status: DISCONTINUED | OUTPATIENT
Start: 2022-01-11 | End: 2022-01-11 | Stop reason: HOSPADM

## 2022-01-11 RX ORDER — IBUPROFEN 600 MG/1
600 TABLET, FILM COATED ORAL
Status: DISCONTINUED | OUTPATIENT
Start: 2022-01-11 | End: 2022-01-11 | Stop reason: HOSPADM

## 2022-01-11 RX ORDER — METHYLERGONOVINE MALEATE 0.2 MG/ML
200 INJECTION INTRAVENOUS
Status: DISCONTINUED | OUTPATIENT
Start: 2022-01-11 | End: 2022-01-11 | Stop reason: HOSPADM

## 2022-01-11 RX ORDER — OXYTOCIN/0.9 % SODIUM CHLORIDE 30/500 ML
340 PLASTIC BAG, INJECTION (ML) INTRAVENOUS CONTINUOUS PRN
Status: DISCONTINUED | OUTPATIENT
Start: 2022-01-11 | End: 2022-01-11 | Stop reason: HOSPADM

## 2022-01-11 RX ORDER — NALOXONE HYDROCHLORIDE 0.4 MG/ML
0.2 INJECTION, SOLUTION INTRAMUSCULAR; INTRAVENOUS; SUBCUTANEOUS
Status: DISCONTINUED | OUTPATIENT
Start: 2022-01-11 | End: 2022-01-11 | Stop reason: HOSPADM

## 2022-01-11 RX ORDER — PROCHLORPERAZINE 25 MG
25 SUPPOSITORY, RECTAL RECTAL EVERY 12 HOURS PRN
Status: DISCONTINUED | OUTPATIENT
Start: 2022-01-11 | End: 2022-01-11 | Stop reason: HOSPADM

## 2022-01-11 RX ORDER — NALOXONE HYDROCHLORIDE 0.4 MG/ML
0.4 INJECTION, SOLUTION INTRAMUSCULAR; INTRAVENOUS; SUBCUTANEOUS
Status: DISCONTINUED | OUTPATIENT
Start: 2022-01-11 | End: 2022-01-11 | Stop reason: HOSPADM

## 2022-01-11 RX ORDER — ONDANSETRON 4 MG/1
4 TABLET, ORALLY DISINTEGRATING ORAL EVERY 6 HOURS PRN
Status: DISCONTINUED | OUTPATIENT
Start: 2022-01-11 | End: 2022-01-11 | Stop reason: HOSPADM

## 2022-01-11 RX ORDER — MISOPROSTOL 200 UG/1
800 TABLET ORAL
Status: DISCONTINUED | OUTPATIENT
Start: 2022-01-11 | End: 2022-01-11 | Stop reason: HOSPADM

## 2022-01-11 RX ORDER — METOCLOPRAMIDE 10 MG/1
10 TABLET ORAL EVERY 6 HOURS PRN
Status: DISCONTINUED | OUTPATIENT
Start: 2022-01-11 | End: 2022-01-11 | Stop reason: HOSPADM

## 2022-01-11 NOTE — DISCHARGE INSTRUCTIONS
Discharge Instruction for Undelivered Patients      You were seen for: Fetal Assessment  We Consulted: [facility] CNM and [facility]  You had (Test or Medicine):BPP and extended monitoring     Diet:   Drink 8 to 12 glasses of liquids (milk, juice, water) every day.  You may eat meals and snacks.     Activity:  Count fetal kicks everyday (see handout)  Call your doctor or nurse midwife if your baby is moving less than usual.     Call your provider if you notice:  Swelling in your face or increased swelling in your hands or legs.  Headaches that are not relieved by Tylenol (acetaminophen).  Changes in your vision (blurring: seeing spots or stars.)  Nausea (sick to your stomach) and vomiting (throwing up).   Weight gain of 5 pounds or more per week.  Heartburn that doesn't go away.  Signs of bladder infection: pain when you urinate (use the toilet), need to go more often and more urgently.  The bag of evans (rupture of membranes) breaks, or you notice leaking in your underwear.  Bright red blood in your underwear.  Abdominal (lower belly) or stomach pain.  For first baby: Contractions (tightening) less than 5 minutes apart for one hour or more.  Second (plus) baby: Contractions (tightening) less than 10 minutes apart and getting stronger.  *If less than 34 weeks: Contractions (tightening) more than 6 times in one hour.  Increase or change in vaginal discharge (note the color and amount)      Follow-up:  As scheduled in the clinic
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
## 2022-01-11 NOTE — PROGRESS NOTES
This nurse assumed care at 0400. At that time, patient reported occasional contractions and mild pain to upper mid abdomen. FHR category I at that time as well. Patient currently reporting no contractions, TOCO monitor only tracing mild uterine irritability. FHR remains category I. Normal fetal movement, no leaking of fluid, no vaginal bleeding. Patient able to sleep through rest of night into morning. Will continue to monitor.
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
## 2022-01-12 ENCOUNTER — PATIENT OUTREACH (OUTPATIENT)
Dept: FAMILY MEDICINE | Facility: CLINIC | Age: 22
End: 2022-01-12
Payer: COMMERCIAL
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
## 2022-01-13 ENCOUNTER — THERAPY VISIT (OUTPATIENT)
Dept: PHYSICAL THERAPY | Facility: CLINIC | Age: 22
End: 2022-01-13
Payer: COMMERCIAL

## 2022-01-13 ENCOUNTER — TELEPHONE (OUTPATIENT)
Dept: FAMILY MEDICINE | Facility: CLINIC | Age: 22
End: 2022-01-13

## 2022-01-13 ENCOUNTER — PRENATAL OFFICE VISIT (OUTPATIENT)
Dept: MIDWIFE SERVICES | Facility: CLINIC | Age: 22
End: 2022-01-13
Payer: COMMERCIAL

## 2022-01-13 VITALS
HEIGHT: 68 IN | BODY MASS INDEX: 23.84 KG/M2 | HEART RATE: 62 BPM | WEIGHT: 157.3 LBS | SYSTOLIC BLOOD PRESSURE: 107 MMHG | DIASTOLIC BLOOD PRESSURE: 63 MMHG

## 2022-01-13 DIAGNOSIS — M54.50 LUMBAGO: ICD-10-CM

## 2022-01-13 DIAGNOSIS — Z34.03 ENCOUNTER FOR SUPERVISION OF NORMAL FIRST PREGNANCY IN THIRD TRIMESTER: ICD-10-CM

## 2022-01-13 DIAGNOSIS — Z34.03 ENCOUNTER FOR SUPERVISION OF NORMAL FIRST PREGNANCY IN THIRD TRIMESTER: Primary | ICD-10-CM

## 2022-01-13 DIAGNOSIS — M25.551 HIP PAIN, RIGHT: Primary | ICD-10-CM

## 2022-01-13 PROBLEM — D80.2 IGA DEFICIENCY (H): Chronic | Status: ACTIVE | Noted: 2021-06-01

## 2022-01-13 PROCEDURE — 99207 PR PRENATAL VISIT: CPT | Performed by: ADVANCED PRACTICE MIDWIFE

## 2022-01-13 PROCEDURE — 97161 PT EVAL LOW COMPLEX 20 MIN: CPT | Mod: GP | Performed by: PHYSICAL THERAPIST

## 2022-01-13 PROCEDURE — 97140 MANUAL THERAPY 1/> REGIONS: CPT | Mod: GP | Performed by: PHYSICAL THERAPIST

## 2022-01-13 PROCEDURE — 87653 STREP B DNA AMP PROBE: CPT | Performed by: ADVANCED PRACTICE MIDWIFE

## 2022-01-13 RX ORDER — UREA 10 %
500 LOTION (ML) TOPICAL 2 TIMES DAILY
COMMUNITY

## 2022-01-13 ASSESSMENT — MIFFLIN-ST. JEOR: SCORE: 1517.01

## 2022-01-13 ASSESSMENT — ACTIVITIES OF DAILY LIVING (ADL)
HOS_ADL_SCORE(%): 61.76
GOING_UP_1_FLIGHT_OF_STAIRS: SLIGHT DIFFICULTY
TWISTING/PIVOTING_ON_INVOLVED_LEG: EXTREME DIFFICULTY
ROLLING_OVER_IN_BED: EXTREME DIFFICULTY
GOING_DOWN_1_FLIGHT_OF_STAIRS: NO DIFFICULTY AT ALL
WALKING_UP_STEEP_HILLS: MODERATE DIFFICULTY
HEAVY_WORK: EXTREME DIFFICULTY
HOS_ADL_ITEM_SCORE_TOTAL: 42
HOS_ADL_HIGHEST_POTENTIAL_SCORE: 68
WALKING_15_MINUTES_OR_GREATER: SLIGHT DIFFICULTY
STEPPING_UP_AND_DOWN_CURBS: NO DIFFICULTY AT ALL
WALKING_INITIALLY: NO DIFFICULTY AT ALL
GETTING_INTO_AND_OUT_OF_A_BATHTUB: SLIGHT DIFFICULTY
GETTING_INTO_AND_OUT_OF_AN_AVERAGE_CAR: EXTREME DIFFICULTY
PUTTING_ON_SOCKS_AND_SHOES: MODERATE DIFFICULTY
WALKING_DOWN_STEEP_HILLS: MODERATE DIFFICULTY
STANDING_FOR_15_MINUTES: NO DIFFICULTY AT ALL
SITTING_FOR_15_MINUTES: NO DIFFICULTY AT ALL
LIGHT_TO_MODERATE_WORK: MODERATE DIFFICULTY
RECREATIONAL_ACTIVITIES: EXTREME DIFFICULTY
HOS_ADL_COUNT: 17
DEEP_SQUATTING: MODERATE DIFFICULTY
WALKING_APPROXIMATELY_10_MINUTES: NO DIFFICULTY AT ALL

## 2022-01-13 NOTE — TELEPHONE ENCOUNTER
"I'd be happy to help review her questions.   I would recommend we schedule just an office visit for mom as the patient for \"discuss family planning\" and we can review any questions she has. Then we can talk about when to be scheduled for a  visit for her child.    "
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
## 2022-01-13 NOTE — TELEPHONE ENCOUNTER
Reason for Call:  Other Est care for unborn child UNBORN CHILD     Detailed commentS Litzy was looking at having  as child PCP had wanted to schedule something out but did tell her that we don't schedule those until baby is born. She would like to talk to Dr. Hightower or maybe do a meet and greet with her.    Phone Number Patient can be reached at: Home number on file [phone] (home)    Best Time: any    Can we leave a detailed message on this number? YES    Call taken on 1/13/2022 at 1:52 PM by Katerin Fregoso

## 2022-01-13 NOTE — PATIENT INSTRUCTIONS
End of Pregnancy Information    We are so pleased you have chosen us to help you with your birth.  Our goal is to help support a safe, meaningful and rewarding birth experience for you and your family.  The following information may be helpful as you near the end of your pregnancy.  If you would like to schedule a tour of the [facility] please call [phone].    LABOR INSTRUCTIONS  If you would like to try and avoid the use of pain medications for your labor, one of the best things you can do is to stay home as long as your and your baby s condition allow it, especially during the early part of your labor.  The Midwife on call can help guide you through this part if you are unsure, call us at [phone] and press option 1 any time, day or night.  Part of our assessment will be in speaking with you directly on the phone.  How do I know if I am in active labor?   Active labor will be regular, painful contractions that are lasting a minute or more and coming every 5 minutes for more than an hour in a row.  The intensity, or how much they hurt is more important that how frequently or often the contractions are coming.  You could try and take a hot shower or tub bath, if the contractions continue to increase in intensity or frequency, it can be a sign of active labor.  If you feel like you could lay down and doze between contractions, then do that, doze, let your contraction come and know that your body is working towards having your baby and then relax and rest again.  You could try having a light snack, like fruit, cereal or toast and continue to drink fluids.    Reasons to call the On Call Midwife @ [phone]  o If you have vaginal bleeding like a heavy period  o If you think your water broke  o If you think your baby has not been moving enough  o If you think you are in labor  o If you are worried or concerned about yourself or your baby      PAIN MEDICATIONS IN LABOR  Should I use pain  medications in labor?  This decision to use pain medication or not during labor is for each woman to decide.  Some women have very strong feelings one way or the other, others would like to wait and see how it goes.  My personal recommendation would be to be flexible.  If someone is laboring and coping well with the process, they are making progress and do not want pain medication then they don t need it.  We have bathtubs, birthing balls, labor slings, rocking chairs and a labor position menu to support laboring without intervention and/or pain medication.  However, if someone has been awake with early labor for several nights, is having a hard time coping with the process, or wants pain medication, then pain medication can be a helpful tool to have.  What types of pain medications are available to me during my labor?    Nitrous Oxide  Nitrous Oxide is a quick acting gas that you breathe in through a mask.  It is used all over the world for labor pain relief.  Many dentists use it too.  We call it  nitrous  for short, other people may call it laughing gas.  We have a handout with more details about using Nitrous Oxide during labor.    IV narcotic, Fentanyl   Narcotic pain medications are medicines that go into your IV or as a shot into your muscle.  They help to  take the edge off the pain .  Thy do not, in general remove the pain.  They can make you feel kind of sleepy and help you relax better, especially in the earlier parts of labor.  Most of the time they last a short time (1-4 hours).  These medicines do  get to  your baby.  So your baby may get sleepy too.  This does not hurt your baby.  We do not give these medications if we think you will give birth to your baby soon.  This way your body will get rid of the medicine before your baby is born.    Epidural  Epidural is the most common pain medicine used by women in labor across the [place].   An epidural is a tiny, soft tube that is placed into your  lower back that can give you pain medicine until after your baby is born.  Most often, a labor epidural provides the most complete labor pain relief that we have available.  A labor epidural is placed by a special doctor call an Anesthesiologist.  In order to have one placed you need to have an IV, and then sit on the side of the bed in kind of a slouched position curling around your baby.  After an epidural is placed you will need to stay in bed, we will help you change positions from side to side and sitting up to help your labor continue to progress.  We will also empty your bladder with a catheter about every three hours.    What Should I Bring to the Hospital?    A picture ID    Your insurance card    Your birth plan if you have one    Eyeglasses and/or contacts    Toiletries: Toothbrush and toothpaste, lip balm (it can be very dry at the hospital) lotion, deodorant, a brush or comb, hairband or barrettes    A bathrobe if you like to wear one    Warm sock or slippers    A camera and     Cell phone and cell phone     A comfy nursing bra    A soft blanket for baby    An infant car seat    A going home outfit for you, soft and comfortable, something you may have worn when you were about 5 months pregnant    A going home outfit and hat for your baby    Snacks for your support people    Some optional treats for you and your partner:  o A pillow from home  o Flameless candles  o A music player  o Treats to eat in postpartum like granola bars, cookies and fruit  o A breastfeeding pillow - especially if you intend to use one when you go home    * * * *   We are looking forward to working with you during this special time in your life.  If you have questions or have something we can do to help you, please don't hesitate to ask at one of your appointments, on VISENZEt or calling [phone] and press option 1.    LISA Funes LATRICIA         Birth Planning Worksheet Information    The following is a  worksheet to help you consider your preferences for your labor, birth and hospital stay.  Please discuss your preferences at your prenatal visits and share a copy of this form with your nurses and provider when you arrive at the Birthplace.    If you would like to have waterbirth as an option, please talk to your Midwife at a prenatal appointment so we can review and have you sign a consent and draw a Hepatitis C  blood test.    During uncomplicated pregnancies and labors:    We encourage support person/s to be actively involved in the birth process.  This may include providing comfort measures during labor, cutting the umbilical cord, being present at  birth and helping with infant cares.    The external fetal monitor is connected when you arrive at the hospital until a reassuring pattern is obtained.  This takes a minimum of twenty minutes.  If all is well, we then listen to the baby s heartbeat periodically.    We encouraged walking, changing positions, using the tub, birthing ball and labor slings during labor.  We will help you give birth in the position that is most effective and comfortable for you.    The decision for pain medications (including epidural) is left up to you    At delivery the baby will be placed skin to skin on your abdomen to allow for delayed cord clamping and usually until after the first breastfeeding.    Administration of Vitamin K and antibiotic eye ointment are recommended for your baby within the first two hours of life.    During uncomplicated pregnancies or labors, WE DO NOT routinely:  o Start IVs or use internal fetal monitors  o Use Pitocin or other medications during labor  o Do episiotomies, give enemas or shave pubic hair  o Use forceps, vacuum extraction or encouraged  birth    After your baby is born, for healthy moms and babies:    Your baby will room in with you and your support person is encouraged to stay    We will give you individualized help with  breastfeeding or formula feeding and caring for your baby    No pacifiers, bottles or formula will be given to  babies    All routine  procedures and exams will be done in your room    Circumcisions are not performed at the Birthplace.  Please discuss your circumcision options with your baby s health care provider.          Please feel free to answer the questions with as much or as little detail as you would like.  It is okay if you prefer not to fill out a birth plan or if you have another format.  It is also okay if you only want to answer a few questions but not all of them. This is for you if you find it helpful.    If you do chose to fill it out please bring it to your next appointment so you can go through it with your provider and scan it into your chart.    Your Birth Preferences Worksheet      Your doctor or midwife is _________________________  Expectant mother s name _________________________  Partner s name __________________________________  Other support people attending your birth_____________  _______________________________________________        Is there anything that you would like your healthcare team to know about you?          Do you have any fears or concerns about the birth of your baby?          How have you prepared for the experience of birth?           Your preferences for your labor and birth?           Your thoughts about  pain relief?           Your preferences for postpartum and for your ?           Your plans for feeding your baby?           Other ways we can help you?     Water Birth Patient Education    Many studies have reported on the benefits of water birth, who is a safe candidate for water birth, as well the risks of water birth including the potential for rare complications    Research has shown:    - For many people, immersion in warm water is a helpful tool for pain management and freedom of movement.  People who give birth in water use less pain  medicine.  - Labor may be shorter, especially for people having their first baby.  - Vaginal tears are more common, but less severe.  - Many people have increase satisfaction and have a positive birth experience.  - There is less need for medicine to speed up labor when using water immersion during labor.    Potential Complications of water birth include:    - Trouble lifting the baby out of the water to take their first breath (this can happen when the umbilical cord is too short to allow them to surface to the water)  - A torn umbilical cord, which can typically be easily managed but can lead to blood loss for baby.  - Baby may breath in or swallow the tub water.  - It can be difficult to know how much blood is lost during birth when in the water.  - In the event of an emergency, it may be more difficult or take more time to help the patient and baby if they must first move form the pool to the bed.    If you want to use the water birth pool during labor, these are some expectations that are important to know:    - Use of water birth pool for hydrotherapy and/or birth is intended for pregnant patients experiencing a low risk, uncomplicated pregnancy.  You must be at least 37 weeks pregnant and the baby is in the head down position.    - Testing has been completed during pregnancy to show that you do not have HIV, Hepatitis B, or Hepatitis C.  Water birth is not an option for people who have an infection that can spread through blood and other body fluids.  Discuss COVID-19 recommendations with your provider.  - Your blood pressure, pulse, temperature, and baby's heart rate must be normal throughout the labor and delivery.  - The care team will perform cervical checks to help determine the best tome to get into the pool.  Water birth works best for patients in active labor.  - The care team will be able to listen to the baby's heart rate as needed when you are in the pool with a hand held doppler.  - It may become  necessary for you to get out of the pool.  The care team let you know when and why this might need to happen.  - Only patients are allowed in the pool.  Someone must be with you in the room at all times while you are in the pool.  - The care team will help you out of the pool a few minutes after the birth of baby for delivery of the placenta out of the water, in the bed.    Water birth may not be possible for all patients due to safety reasons and [facility] has a detailed guideline listing the eligibility requirements.

## 2022-01-14 LAB
GP B STREP DNA SPEC QL NAA+PROBE: NEGATIVE
PATIENT PENICILLIN, AMOXICILLIN, CEPHALOSPORINS ALLERGY: NO

## 2022-01-14 NOTE — TELEPHONE ENCOUNTER
TCB for scheduling. Informed patient of Dr Hightower's request to schedule an OV for herself to discuss family planning.

## 2022-01-20 ENCOUNTER — PRENATAL OFFICE VISIT (OUTPATIENT)
Dept: MIDWIFE SERVICES | Facility: CLINIC | Age: 22
End: 2022-01-20
Payer: COMMERCIAL

## 2022-01-20 ENCOUNTER — THERAPY VISIT (OUTPATIENT)
Dept: PHYSICAL THERAPY | Facility: CLINIC | Age: 22
End: 2022-01-20
Payer: COMMERCIAL

## 2022-01-20 VITALS
HEART RATE: 91 BPM | DIASTOLIC BLOOD PRESSURE: 76 MMHG | SYSTOLIC BLOOD PRESSURE: 118 MMHG | WEIGHT: 159 LBS | OXYGEN SATURATION: 95 % | BODY MASS INDEX: 24.18 KG/M2

## 2022-01-20 DIAGNOSIS — Z34.83 ENCOUNTER FOR SUPERVISION OF OTHER NORMAL PREGNANCY, THIRD TRIMESTER: Primary | ICD-10-CM

## 2022-01-20 DIAGNOSIS — M25.551 HIP PAIN, RIGHT: Primary | ICD-10-CM

## 2022-01-20 PROCEDURE — 99207 PR PRENATAL VISIT: CPT | Performed by: ADVANCED PRACTICE MIDWIFE

## 2022-01-20 PROCEDURE — 97140 MANUAL THERAPY 1/> REGIONS: CPT | Mod: GP | Performed by: PHYSICAL THERAPIST

## 2022-01-20 PROCEDURE — 97112 NEUROMUSCULAR REEDUCATION: CPT | Mod: GP | Performed by: PHYSICAL THERAPIST

## 2022-01-20 NOTE — PROGRESS NOTES
37w0d  Feeling well. Has BH contractions regularly. Sometimes they are more painful (1x per hour). Reports good fetal movement. Denies leaking of fluid, vaginal bleeding, regular uterine contractions, headache or other concerns.  Discussed when to call or come in for labor. Feels ready for baby. RTC in 1 wk [facility]

## 2022-01-21 ENCOUNTER — VIRTUAL VISIT (OUTPATIENT)
Dept: BEHAVIORAL HEALTH | Facility: CLINIC | Age: 22
End: 2022-01-21
Payer: COMMERCIAL

## 2022-01-21 DIAGNOSIS — F43.23 ADJUSTMENT DISORDER WITH MIXED ANXIETY AND DEPRESSED MOOD: Primary | ICD-10-CM

## 2022-01-21 PROCEDURE — 90791 PSYCH DIAGNOSTIC EVALUATION: CPT | Mod: 95 | Performed by: SOCIAL WORKER

## 2022-01-21 ASSESSMENT — COLUMBIA-SUICIDE SEVERITY RATING SCALE - C-SSRS
2. HAVE YOU ACTUALLY HAD ANY THOUGHTS OF KILLING YOURSELF LIFETIME?: NO
TOTAL  NUMBER OF ABORTED OR SELF INTERRUPTED ATTEMPTS PAST LIFETIME: NO
TOTAL  NUMBER OF INTERRUPTED ATTEMPTS PAST 3 MONTHS: NO
1. IN THE PAST MONTH, HAVE YOU WISHED YOU WERE DEAD OR WISHED YOU COULD GO TO SLEEP AND NOT WAKE UP?: NO
2. HAVE YOU ACTUALLY HAD ANY THOUGHTS OF KILLING YOURSELF?: NO
TOTAL  NUMBER OF INTERRUPTED ATTEMPTS LIFETIME: NO
TOTAL  NUMBER OF ABORTED OR SELF INTERRUPTED ATTEMPTS PAST 3 MONTHS: NO
ATTEMPT PAST THREE MONTHS: NO
6. HAVE YOU EVER DONE ANYTHING, STARTED TO DO ANYTHING, OR PREPARED TO DO ANYTHING TO END YOUR LIFE?: NO
ATTEMPT LIFETIME: NO
1. IN THE PAST MONTH, HAVE YOU WISHED YOU WERE DEAD OR WISHED YOU COULD GO TO SLEEP AND NOT WAKE UP?: NO
6. HAVE YOU EVER DONE ANYTHING, STARTED TO DO ANYTHING, OR PREPARED TO DO ANYTHING TO END YOUR LIFE?: NO
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
## 2022-01-24 ENCOUNTER — OFFICE VISIT (OUTPATIENT)
Dept: FAMILY MEDICINE | Facility: CLINIC | Age: 22
End: 2022-01-24
Payer: COMMERCIAL

## 2022-01-24 VITALS
SYSTOLIC BLOOD PRESSURE: 100 MMHG | WEIGHT: 162.9 LBS | BODY MASS INDEX: 24.77 KG/M2 | HEART RATE: 72 BPM | DIASTOLIC BLOOD PRESSURE: 68 MMHG

## 2022-01-24 DIAGNOSIS — Z31.61 COUNSELING ABOUT NATURAL FAMILY PLANNING: Primary | ICD-10-CM

## 2022-01-24 PROCEDURE — 99214 OFFICE O/P EST MOD 30 MIN: CPT | Performed by: FAMILY MEDICINE
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
## 2022-01-28 ENCOUNTER — VIRTUAL VISIT (OUTPATIENT)
Dept: BEHAVIORAL HEALTH | Facility: CLINIC | Age: 22
End: 2022-01-28
Payer: COMMERCIAL

## 2022-01-28 DIAGNOSIS — F43.23 ADJUSTMENT DISORDER WITH MIXED ANXIETY AND DEPRESSED MOOD: Primary | ICD-10-CM

## 2022-01-28 PROCEDURE — 90834 PSYTX W PT 45 MINUTES: CPT | Mod: 95 | Performed by: SOCIAL WORKER

## 2022-01-28 NOTE — PROGRESS NOTES
[facility]- Integrated Behavioral Health Services  January 28, 2022    Behavioral Health Clinician Progress Note    Patient Name: Litzy Hatch      Telemedicine Visit: The patient's condition can be safely assessed and treated via synchronous audio and visual telemedicine encounter.      Reason for Telemedicine Visit: Services only offered telehealth    Originating Site (Patient Location): Patient's home    Distant Site (Provider Location): [facility] Clinics: Maternal Fetal Medicine    Consent:  The patient/guardian has verbally consented to: the potential risks and benefits of telemedicine (video visit) versus in person care; bill my insurance or make self-payment for services provided; and responsibility for payment of non-covered services.     Mode of Communication:  Video Conference via POPAPP    As the provider I attest to compliance with applicable laws and regulations related to telemedicine.         Service Type:  Individual     Session Start Time: 12:06 pm  Session End Time: 12: 58 pm       Session Length: 38 - 52      Attendees: Patient    Visit Activities (Refresh list every visit): [facility] Only    Diagnostic Assessment Date: 1/21/22  Treatment Plan Review Date: To be completed after patient transitions postpartum to determine needs and appropriate goals.   See Flowsheets for today's PHQ-9 and CHRISTINA-7 results  Previous PHQ-9: No flowsheet data found.  Previous CHRISTINA-7: No flowsheet data found.    JONNATHAN LEVEL:  No flowsheet data found.    DATA  Extended Session (60+ minutes): No  Interactive Complexity: No  Crisis: No  [facility] Patient: No    Treatment Objective(s) Addressed in This Session:  Target Behavior(s): disease management/lifestyle changes , mental health management    Anxiety: will experience a reduction in anxiety and will develop more effective coping skills to manage anxiety symptoms  Adjustment Difficulties: will develop coping/problem-solving  "skills to facilitate more adaptive adjustment    Current Stressors / Issues:  Patient reported overall she is doing well. She stated that she is finishing final tasks prior to baby's birth. She stated she is currently trying to navigate if she should tell family about going into labor. Patient discussed worries about peoples' reactions if she does not tell them, but concerns about how her labor and delivery experience may be impacted if people are asking for updates along the way. Validated and normalized the challenges with making this decision, and explored questions for patient to consider related to what would be most helpful for her and her family during this time.     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 Patient agrees that she may be at risk for blaming herself if she \"misses something\" or will feel inadequate if she cannot figure out how to resolve certain situations. Explored managing expectations, being flexible, and shifting mindset from being results orientated to process orientated in regards to defining success in the early days of motherhood.  Patient discussed familial pressure to put \"baby first\" and how she is starting to think about her supports, who she can go to for what type of scenarios, as she knows that it will be important to hear supportive comments that prioritize her as a mother too.  Patient stated that both she and her partner are feeling more confident in speaking out if there are mental health concerns noted in her postpartum since there is a plan and support in place.     Progress on Treatment Objective(s) / Homework:  New Objective established this session - PREPARATION (Decided to change - considering how); Intervened by negotiating a change plan and determining options / strategies for behavior change, " identifying triggers, exploring social supports, and working towards setting a date to begin behavior change    Motivational Interviewing    MI Intervention: Expressed Empathy/Understanding, Open-ended questions, Reflections: simple and complex, Change talk (evoked) and Reframe     Change Talk Expressed by the Patient: Desire to change Ability to change Reasons to change    Provider Response to Change Talk: E - Evoked more info from patient about behavior change, A - Affirmed patient's thoughts, decisions, or attempts at behavior change, R - Reflected patient's change talk and S - Summarized patient's change talk statements    Cognitive Behavioral Therapy: Discussed connection between thoughts, feelings, and behaviors. Taught patient how to identify cognitive distortions, and assisted patient to identify own cognitive distortions. Taught and engaged patient in cognitive restructuring techniques.    Also provided psychoeducation about behavioral health condition, symptoms, and treatment options    Care Plan review completed: No    Medication Review:  No current psychiatric medications prescribed    Medication Compliance:  NA    Changes in Health Issues:   None reported    Chemical Use Review:   Substance Use: Chemical use reviewed, no active concerns identified      Tobacco Use: No current tobacco use.      Assessment: Current Emotional / Mental Status (status of significant symptoms):  Risk status (Self / Other harm or suicidal ideation)  Patient denies a history of suicidal ideation, suicide attempts, self-injurious behavior, homicidal ideation, homicidal behavior and and other safety concerns  Patient denies current fears or concerns for personal safety.  Patient denies current or recent suicidal ideation or behaviors.  Patient denies current or recent homicidal ideation or behaviors.  Patient denies current or recent self injurious behavior or ideation.  Patient denies other safety concerns.  A safety and risk  management plan has not been developed at this time, however patient was encouraged to call [place] [phone] should there be a change in any of these risk factors.    Appearance:   Appropriate   Eye Contact:   Good   Psychomotor Behavior: Normal   Attitude:   Cooperative   Orientation:   All  Speech   Rate / Production: Normal    Volume:  Normal   Mood:    Normal  Affect:    Appropriate   Thought Content:  Clear   Thought Form:  Coherent  Logical   Insight:    Good     Diagnoses:  1. Adjustment disorder with mixed anxiety and depressed mood        Collateral Reports Completed:  Not Applicable    Plan: (Homework, other):  Patient was given information about behavioral services and encouraged to schedule a follow up appointment with the clinic [facility] as needed postpartum. No follow up scheduled due to being 38 weeks pregnant. Patient to reach out if needed prior to delivery, with plan for [facility] outreach after delivery. She was also given CBT techniques to help manage potential feelings of inadequacy or feelings of being failuire. Discussed ways to have conversation with partner about setting expectations regarding the transition postpartum.  CD Recommendations: No indications of CD issues.  PAPITO Serrano, [facility]      ______________________________________________________________________        PAPITO Serrano  January 28, 2022

## 2022-02-03 ENCOUNTER — TELEPHONE (OUTPATIENT)
Dept: MIDWIFE SERVICES | Facility: CLINIC | Age: 22
End: 2022-02-03

## 2022-02-03 ENCOUNTER — HOSPITAL ENCOUNTER (INPATIENT)
Facility: CLINIC | Age: 22
LOS: 2 days | Discharge: HOME | End: 2022-02-05
Attending: ADVANCED PRACTICE MIDWIFE | Admitting: ADVANCED PRACTICE MIDWIFE
Payer: COMMERCIAL

## 2022-02-03 ENCOUNTER — PRENATAL OFFICE VISIT (OUTPATIENT)
Dept: MIDWIFE SERVICES | Facility: CLINIC | Age: 22
End: 2022-02-03
Payer: COMMERCIAL

## 2022-02-03 VITALS
BODY MASS INDEX: 23.95 KG/M2 | HEART RATE: 62 BPM | HEIGHT: 68 IN | SYSTOLIC BLOOD PRESSURE: 111 MMHG | WEIGHT: 158 LBS | DIASTOLIC BLOOD PRESSURE: 74 MMHG

## 2022-02-03 DIAGNOSIS — O47.1 FALSE LABOR AFTER 37 COMPLETED WEEKS OF GESTATION: Primary | ICD-10-CM

## 2022-02-03 DIAGNOSIS — Z34.83 ENCOUNTER FOR SUPERVISION OF OTHER NORMAL PREGNANCY, THIRD TRIMESTER: ICD-10-CM

## 2022-02-03 DIAGNOSIS — E03.8 SUBCLINICAL HYPOTHYROIDISM: ICD-10-CM

## 2022-02-03 LAB
ABO/RH(D): NORMAL
ANTIBODY SCREEN: NEGATIVE
BASOPHILS # BLD AUTO: 0 10E3/UL (ref 0–0.2)
BASOPHILS NFR BLD AUTO: 0 %
EOSINOPHIL # BLD AUTO: 0 10E3/UL (ref 0–0.7)
EOSINOPHIL NFR BLD AUTO: 0 %
ERYTHROCYTE [DISTWIDTH] IN BLOOD BY AUTOMATED COUNT: 13.1 % (ref 10–15)
HCT VFR BLD AUTO: 37.3 % (ref 35–47)
HGB BLD-MCNC: 12.6 G/DL (ref 11.7–15.7)
IMM GRANULOCYTES # BLD: 0.1 10E3/UL
IMM GRANULOCYTES NFR BLD: 1 %
LYMPHOCYTES # BLD AUTO: 0.8 10E3/UL (ref 0.8–5.3)
LYMPHOCYTES NFR BLD AUTO: 4 %
MCH RBC QN AUTO: 30.3 PG (ref 26.5–33)
MCHC RBC AUTO-ENTMCNC: 33.8 G/DL (ref 31.5–36.5)
MCV RBC AUTO: 90 FL (ref 78–100)
MONOCYTES # BLD AUTO: 0.9 10E3/UL (ref 0–1.3)
MONOCYTES NFR BLD AUTO: 4 %
NEUTROPHILS # BLD AUTO: 18.5 10E3/UL (ref 1.6–8.3)
NEUTROPHILS NFR BLD AUTO: 91 %
NRBC # BLD AUTO: 0 10E3/UL
NRBC BLD AUTO-RTO: 0 /100
PLATELET # BLD AUTO: 242 10E3/UL (ref 150–450)
RBC # BLD AUTO: 4.16 10E6/UL (ref 3.8–5.2)
SARS-COV-2 RNA RESP QL NAA+PROBE: NEGATIVE
SPECIMEN EXPIRATION DATE: NORMAL
WBC # BLD AUTO: 20.3 10E3/UL (ref 4–11)

## 2022-02-03 PROCEDURE — 85004 AUTOMATED DIFF WBC COUNT: CPT | Performed by: ADVANCED PRACTICE MIDWIFE

## 2022-02-03 PROCEDURE — 59400 OBSTETRICAL CARE: CPT | Performed by: ADVANCED PRACTICE MIDWIFE

## 2022-02-03 PROCEDURE — 0UQMXZZ REPAIR VULVA, EXTERNAL APPROACH: ICD-10-PCS | Performed by: ADVANCED PRACTICE MIDWIFE

## 2022-02-03 PROCEDURE — 86780 TREPONEMA PALLIDUM: CPT | Performed by: ADVANCED PRACTICE MIDWIFE

## 2022-02-03 PROCEDURE — 250N000009 HC RX 250

## 2022-02-03 PROCEDURE — 250N000013 HC RX MED GY IP 250 OP 250 PS 637: Performed by: ADVANCED PRACTICE MIDWIFE

## 2022-02-03 PROCEDURE — 87635 SARS-COV-2 COVID-19 AMP PRB: CPT | Performed by: ADVANCED PRACTICE MIDWIFE

## 2022-02-03 PROCEDURE — 722N000001 HC LABOR CARE VAGINAL DELIVERY SINGLE

## 2022-02-03 PROCEDURE — 86850 RBC ANTIBODY SCREEN: CPT | Performed by: ADVANCED PRACTICE MIDWIFE

## 2022-02-03 PROCEDURE — 99207 PR PRENATAL VISIT: CPT | Performed by: ADVANCED PRACTICE MIDWIFE

## 2022-02-03 PROCEDURE — 120N000002 HC R&B MED SURG/OB UMMC

## 2022-02-03 PROCEDURE — 36415 COLL VENOUS BLD VENIPUNCTURE: CPT | Performed by: ADVANCED PRACTICE MIDWIFE

## 2022-02-03 RX ORDER — LIDOCAINE HYDROCHLORIDE 10 MG/ML
INJECTION, SOLUTION EPIDURAL; INFILTRATION; INTRACAUDAL; PERINEURAL
Status: COMPLETED
Start: 2022-02-03 | End: 2022-02-03

## 2022-02-03 RX ORDER — OXYTOCIN/0.9 % SODIUM CHLORIDE 30/500 ML
PLASTIC BAG, INJECTION (ML) INTRAVENOUS
Status: DISCONTINUED
Start: 2022-02-03 | End: 2022-02-04 | Stop reason: HOSPADM

## 2022-02-03 RX ORDER — MISOPROSTOL 200 UG/1
400 TABLET ORAL
Status: DISCONTINUED | OUTPATIENT
Start: 2022-02-03 | End: 2022-02-05 | Stop reason: HOSPADM

## 2022-02-03 RX ORDER — MISOPROSTOL 200 UG/1
400 TABLET ORAL
Status: DISCONTINUED | OUTPATIENT
Start: 2022-02-03 | End: 2022-02-03 | Stop reason: HOSPADM

## 2022-02-03 RX ORDER — OXYTOCIN 10 [USP'U]/ML
10 INJECTION, SOLUTION INTRAMUSCULAR; INTRAVENOUS
Status: DISCONTINUED | OUTPATIENT
Start: 2022-02-03 | End: 2022-02-03 | Stop reason: HOSPADM

## 2022-02-03 RX ORDER — ACETAMINOPHEN 325 MG/1
650 TABLET ORAL EVERY 4 HOURS PRN
Status: DISCONTINUED | OUTPATIENT
Start: 2022-02-03 | End: 2022-02-05 | Stop reason: HOSPADM

## 2022-02-03 RX ORDER — DOCUSATE SODIUM 100 MG/1
100 CAPSULE, LIQUID FILLED ORAL DAILY
Status: DISCONTINUED | OUTPATIENT
Start: 2022-02-04 | End: 2022-02-05 | Stop reason: HOSPADM

## 2022-02-03 RX ORDER — NALOXONE HYDROCHLORIDE 0.4 MG/ML
0.2 INJECTION, SOLUTION INTRAMUSCULAR; INTRAVENOUS; SUBCUTANEOUS
Status: DISCONTINUED | OUTPATIENT
Start: 2022-02-03 | End: 2022-02-03 | Stop reason: HOSPADM

## 2022-02-03 RX ORDER — IBUPROFEN 600 MG/1
600 TABLET, FILM COATED ORAL
Status: DISCONTINUED | OUTPATIENT
Start: 2022-02-03 | End: 2022-02-05 | Stop reason: HOSPADM

## 2022-02-03 RX ORDER — OXYTOCIN/0.9 % SODIUM CHLORIDE 30/500 ML
340 PLASTIC BAG, INJECTION (ML) INTRAVENOUS CONTINUOUS PRN
Status: DISCONTINUED | OUTPATIENT
Start: 2022-02-03 | End: 2022-02-03 | Stop reason: HOSPADM

## 2022-02-03 RX ORDER — PROCHLORPERAZINE 25 MG
25 SUPPOSITORY, RECTAL RECTAL EVERY 12 HOURS PRN
Status: DISCONTINUED | OUTPATIENT
Start: 2022-02-03 | End: 2022-02-03 | Stop reason: HOSPADM

## 2022-02-03 RX ORDER — METHYLERGONOVINE MALEATE 0.2 MG/ML
200 INJECTION INTRAVENOUS
Status: DISCONTINUED | OUTPATIENT
Start: 2022-02-03 | End: 2022-02-03 | Stop reason: HOSPADM

## 2022-02-03 RX ORDER — NALOXONE HYDROCHLORIDE 0.4 MG/ML
0.4 INJECTION, SOLUTION INTRAMUSCULAR; INTRAVENOUS; SUBCUTANEOUS
Status: DISCONTINUED | OUTPATIENT
Start: 2022-02-03 | End: 2022-02-03 | Stop reason: HOSPADM

## 2022-02-03 RX ORDER — IBUPROFEN 800 MG/1
800 TABLET, FILM COATED ORAL EVERY 6 HOURS PRN
Status: DISCONTINUED | OUTPATIENT
Start: 2022-02-04 | End: 2022-02-05 | Stop reason: HOSPADM

## 2022-02-03 RX ORDER — LEVOTHYROXINE SODIUM 25 UG/1
75 TABLET ORAL DAILY
Status: DISCONTINUED | OUTPATIENT
Start: 2022-02-04 | End: 2022-02-05 | Stop reason: HOSPADM

## 2022-02-03 RX ORDER — MISOPROSTOL 200 UG/1
800 TABLET ORAL
Status: DISCONTINUED | OUTPATIENT
Start: 2022-02-03 | End: 2022-02-05 | Stop reason: HOSPADM

## 2022-02-03 RX ORDER — PROCHLORPERAZINE MALEATE 10 MG
10 TABLET ORAL EVERY 6 HOURS PRN
Status: DISCONTINUED | OUTPATIENT
Start: 2022-02-03 | End: 2022-02-03 | Stop reason: HOSPADM

## 2022-02-03 RX ORDER — MISOPROSTOL 200 UG/1
800 TABLET ORAL
Status: DISCONTINUED | OUTPATIENT
Start: 2022-02-03 | End: 2022-02-03 | Stop reason: HOSPADM

## 2022-02-03 RX ORDER — KETOROLAC TROMETHAMINE 30 MG/ML
30 INJECTION, SOLUTION INTRAMUSCULAR; INTRAVENOUS
Status: DISCONTINUED | OUTPATIENT
Start: 2022-02-03 | End: 2022-02-05 | Stop reason: HOSPADM

## 2022-02-03 RX ORDER — MISOPROSTOL 200 UG/1
TABLET ORAL
Status: DISCONTINUED
Start: 2022-02-03 | End: 2022-02-04 | Stop reason: HOSPADM

## 2022-02-03 RX ORDER — OXYTOCIN 10 [USP'U]/ML
10 INJECTION, SOLUTION INTRAMUSCULAR; INTRAVENOUS
Status: DISCONTINUED | OUTPATIENT
Start: 2022-02-03 | End: 2022-02-05 | Stop reason: HOSPADM

## 2022-02-03 RX ORDER — CARBOPROST TROMETHAMINE 250 UG/ML
250 INJECTION, SOLUTION INTRAMUSCULAR
Status: DISCONTINUED | OUTPATIENT
Start: 2022-02-03 | End: 2022-02-05 | Stop reason: HOSPADM

## 2022-02-03 RX ORDER — METOCLOPRAMIDE HYDROCHLORIDE 5 MG/ML
10 INJECTION INTRAMUSCULAR; INTRAVENOUS EVERY 6 HOURS PRN
Status: DISCONTINUED | OUTPATIENT
Start: 2022-02-03 | End: 2022-02-03 | Stop reason: HOSPADM

## 2022-02-03 RX ORDER — ONDANSETRON 4 MG/1
4 TABLET, ORALLY DISINTEGRATING ORAL EVERY 6 HOURS PRN
Status: DISCONTINUED | OUTPATIENT
Start: 2022-02-03 | End: 2022-02-03 | Stop reason: HOSPADM

## 2022-02-03 RX ORDER — TRANEXAMIC ACID 10 MG/ML
1 INJECTION, SOLUTION INTRAVENOUS EVERY 30 MIN PRN
Status: DISCONTINUED | OUTPATIENT
Start: 2022-02-03 | End: 2022-02-05 | Stop reason: HOSPADM

## 2022-02-03 RX ORDER — OXYTOCIN 10 [USP'U]/ML
INJECTION, SOLUTION INTRAMUSCULAR; INTRAVENOUS
Status: DISCONTINUED
Start: 2022-02-03 | End: 2022-02-04 | Stop reason: HOSPADM

## 2022-02-03 RX ORDER — METOCLOPRAMIDE 10 MG/1
10 TABLET ORAL EVERY 6 HOURS PRN
Status: DISCONTINUED | OUTPATIENT
Start: 2022-02-03 | End: 2022-02-03 | Stop reason: HOSPADM

## 2022-02-03 RX ORDER — HYDROXYZINE HYDROCHLORIDE 25 MG/1
100 TABLET, FILM COATED ORAL 3 TIMES DAILY PRN
Qty: 30 TABLET | Refills: 1 | Status: ON HOLD | OUTPATIENT
Start: 2022-02-03 | End: 2022-02-05

## 2022-02-03 RX ORDER — METHYLERGONOVINE MALEATE 0.2 MG/ML
200 INJECTION INTRAVENOUS
Status: DISCONTINUED | OUTPATIENT
Start: 2022-02-03 | End: 2022-02-05 | Stop reason: HOSPADM

## 2022-02-03 RX ORDER — BISACODYL 10 MG
10 SUPPOSITORY, RECTAL RECTAL DAILY PRN
Status: DISCONTINUED | OUTPATIENT
Start: 2022-02-03 | End: 2022-02-05 | Stop reason: HOSPADM

## 2022-02-03 RX ORDER — TRANEXAMIC ACID 10 MG/ML
1 INJECTION, SOLUTION INTRAVENOUS EVERY 30 MIN PRN
Status: DISCONTINUED | OUTPATIENT
Start: 2022-02-03 | End: 2022-02-03 | Stop reason: HOSPADM

## 2022-02-03 RX ORDER — HYDROCORTISONE 2.5 %
CREAM (GRAM) TOPICAL 3 TIMES DAILY PRN
Status: DISCONTINUED | OUTPATIENT
Start: 2022-02-03 | End: 2022-02-05 | Stop reason: HOSPADM

## 2022-02-03 RX ORDER — ONDANSETRON 2 MG/ML
4 INJECTION INTRAMUSCULAR; INTRAVENOUS EVERY 6 HOURS PRN
Status: DISCONTINUED | OUTPATIENT
Start: 2022-02-03 | End: 2022-02-03 | Stop reason: HOSPADM

## 2022-02-03 RX ORDER — OXYTOCIN/0.9 % SODIUM CHLORIDE 30/500 ML
340 PLASTIC BAG, INJECTION (ML) INTRAVENOUS CONTINUOUS PRN
Status: DISCONTINUED | OUTPATIENT
Start: 2022-02-03 | End: 2022-02-05 | Stop reason: HOSPADM

## 2022-02-03 RX ORDER — CARBOPROST TROMETHAMINE 250 UG/ML
250 INJECTION, SOLUTION INTRAMUSCULAR
Status: DISCONTINUED | OUTPATIENT
Start: 2022-02-03 | End: 2022-02-03 | Stop reason: HOSPADM

## 2022-02-03 RX ORDER — MODIFIED LANOLIN
OINTMENT (GRAM) TOPICAL
Status: DISCONTINUED | OUTPATIENT
Start: 2022-02-03 | End: 2022-02-05 | Stop reason: HOSPADM

## 2022-02-03 RX ORDER — OXYTOCIN/0.9 % SODIUM CHLORIDE 30/500 ML
100-340 PLASTIC BAG, INJECTION (ML) INTRAVENOUS CONTINUOUS PRN
Status: DISCONTINUED | OUTPATIENT
Start: 2022-02-03 | End: 2022-02-05 | Stop reason: HOSPADM

## 2022-02-03 RX ADMIN — LIDOCAINE HYDROCHLORIDE 300 MG: 10 INJECTION, SOLUTION EPIDURAL; INFILTRATION; INTRACAUDAL; PERINEURAL at 21:30

## 2022-02-03 RX ADMIN — IBUPROFEN 600 MG: 600 TABLET ORAL at 23:16

## 2022-02-03 ASSESSMENT — MIFFLIN-ST. JEOR: SCORE: 1520.18

## 2022-02-03 NOTE — PROGRESS NOTES
39w0d   Pt has been fabricio since about 2230 last night, frequency about q 6- 8 mins, but only last 15-20 secs and mostly in her back.  States she lost her mucous plug last night, denies LOVF, states she feels like her baby was all of a sudden really low.  States she really didn't get any sleep last night.  Cervical exam: 2-3 cm/80%/- 1 station but cervix wrapped up posteriorly.  Discussed admit to hospital for IV fluids and early labor meds vs vistaril and returning home to see if she could doze.  Pt would like to return home and try to doze.  Discussed early labor at home, comfort measures, warning s/s.    Rx vistaril.  Anticipate she will be at the hospital delivering within 1-3 days.  If not RTC 1 wk.  LISA Funes CNM

## 2022-02-03 NOTE — TELEPHONE ENCOUNTER
"Litzy calling today, unsure if she's having Gravity Dominique or real contractions. Has appt today at 11AM and planning to attend. Her contractions are ~6/hour, lasting ~20seconds. Describes them as \"more intense\" than the Gravity Dominique she's had prior and she's noticed them since last evening. Denies gush of fluid but did notice some \"brown discharge\" last night. Given option to report to L&D but she opts to continue to monitor from home and come to appt today with the knowledge that she will likely be sent to L&D during her appt.   Serenity Blood RN   "

## 2022-02-04 LAB
HGB BLD-MCNC: 12.3 G/DL (ref 11.7–15.7)
T PALLIDUM AB SER QL: NONREACTIVE
TSH SERPL DL<=0.005 MIU/L-ACNC: 2.85 MU/L (ref 0.4–4)

## 2022-02-04 PROCEDURE — 36415 COLL VENOUS BLD VENIPUNCTURE: CPT | Performed by: ADVANCED PRACTICE MIDWIFE

## 2022-02-04 PROCEDURE — 85018 HEMOGLOBIN: CPT | Performed by: ADVANCED PRACTICE MIDWIFE

## 2022-02-04 PROCEDURE — 250N000013 HC RX MED GY IP 250 OP 250 PS 637: Performed by: ADVANCED PRACTICE MIDWIFE

## 2022-02-04 PROCEDURE — 120N000002 HC R&B MED SURG/OB UMMC

## 2022-02-04 PROCEDURE — 84443 ASSAY THYROID STIM HORMONE: CPT | Performed by: ADVANCED PRACTICE MIDWIFE

## 2022-02-04 RX ADMIN — ACETAMINOPHEN 650 MG: 325 TABLET, FILM COATED ORAL at 14:01

## 2022-02-04 RX ADMIN — IBUPROFEN 800 MG: 800 TABLET ORAL at 04:50

## 2022-02-04 RX ADMIN — ACETAMINOPHEN 650 MG: 325 TABLET, FILM COATED ORAL at 08:58

## 2022-02-04 RX ADMIN — ACETAMINOPHEN 650 MG: 325 TABLET, FILM COATED ORAL at 04:49

## 2022-02-04 RX ADMIN — IBUPROFEN 600 MG: 600 TABLET ORAL at 11:07

## 2022-02-04 RX ADMIN — LEVOTHYROXINE SODIUM 75 MCG: 25 TABLET ORAL at 08:26

## 2022-02-04 RX ADMIN — DOCUSATE SODIUM 100 MG: 100 CAPSULE, LIQUID FILLED ORAL at 08:26

## 2022-02-04 RX ADMIN — ACETAMINOPHEN 650 MG: 325 TABLET, FILM COATED ORAL at 00:37

## 2022-02-04 RX ADMIN — ACETAMINOPHEN 650 MG: 325 TABLET, FILM COATED ORAL at 18:06

## 2022-02-04 RX ADMIN — IBUPROFEN 800 MG: 800 TABLET ORAL at 17:33

## 2022-02-04 RX ADMIN — ACETAMINOPHEN 650 MG: 325 TABLET, FILM COATED ORAL at 21:56

## 2022-02-04 NOTE — L&D DELIVERY NOTE
OB Vaginal Delivery Note    Litzy Hatch MRN# [id number]   Age: 21 year old YOB: 2000     Litzy [facility] in 2nd stage of labor. She first noticed urge to push at home, so came to the hospital. She had been laboring at home all day, and most of the night last night. When CNM arrived to room, Litzy was standing at the bedside. Fetal head visible at the perineum. She pushed twice at the bedside, then climbed onto bed on hands and knees. In this position, septate hymen was infiltrated with 1cc lidocaine and clipped with scissor. A few pushes later, Litzy welcomed her daughter. Head delivered and shoulders easily followed. Chuy announced baby's gender, and infant was passed through Litzy's legs to her arms. She leaned back on her heals and brought baby to her belly. She then repositioned to her back. Bleeding was small. Offered IM pitocin for active management of third stage, Litzy declined. Placenta delivered easily with minimal cord traction via Chang mechanism. Fundus was firm and bleeding scant. Perineum inspected and right periurethral noted. Discussed revision of septate hymen, and Litzy agreed. Positioned with bilateral legs in stirrups and tissues infiltrated with lidocaine. Hymen trimmed to be flush with vaginal tissue and right periurethral repaired. Mom and baby were stable in the delivery room. It was a pleasure to attend this beautiful birth.    GA: 39w0d  GP:   Labor Complications: None   EBL:   mL  Delivery QBL:    Delivery Type: Vaginal, Spontaneous   ROM to Delivery Time: (Delivered) Hours: 1 Minutes: 11  Napoleon Weight: 3.51 kg (7 lb 11.8 oz)    1 Minute 5 Minute 10 Minute   Apgar Totals: 8   9        CAREN YOUNGER CIERRA L     Delivery Details:  Litzy Hatch, a 21 year old  female delivered a viable infant with apgars of 8  and 9 . Patient was fully dilated and pushing after 1  hours 0  minutes in active labor. Delivery  was via vaginal, spontaneous  to a sterile field under none  anesthesia. Infant delivered in vertex  left  occiput  anterior  position. Anterior and posterior shoulders delivered without difficulty. The cord was clamped, cut twice and 3 vessels  were noted. Cord blood was obtained in routine fashion with the following disposition: lab .      Cord complications: none   Placenta delivered at 2/3/2022  9:25 PM . Placental disposition was Hospital disposal . Fundal massage performed and fundus found to be firm.     Episiotomy: none    Perineum, vagina, cervix were inspected, and the following lacerations were noted:   Perineal lacerations: none   periurethral laceration: right             Any lacerations were repaired in the usual fashion using 4.0 vicryl.    Excellent hemostasis was noted. Needle count correct. Infant and patient in delivery room in good and stable condition.        Audrey Hatch [[id number]]    Labor Event Times    Labor onset date: 2/3/22 Onset time:  8:00 PM   Dilation complete date: 2/3/22 Complete time:  9:00 PM   Start pushing date/time: 2/3/2022 2105      Labor Length    1st Stage (hrs): 1 (min): 0   2nd Stage (hrs): 0 (min): 11   3rd Stage (hrs): 0 (min): 14      Labor Events     labor?: No   steroids: None  Labor Type: Spontaneous  Predominate monitoring during 1st stage: intermittent electronic fetal monitoring     Antibiotics received during labor?: No     Rupture identifier: Sac 1  Rupture date/time: 2/3/22 2000   Rupture type: Spontaneous rupture of membranes occuring during spontaneous labor or augmentation  Fluid color: Bloody  Fluid odor: Normal     1:1 continuous labor support provided by?: RN, provider Labor partogram used?: no      Delivery/Placenta Date and Time    Delivery Date: 2/3/22 Delivery Time:  9:11 PM   Placenta Date/Time: 2/3/2022  9:25 PM  Delivering clinician: Cong Alexis CNM   Other personnel present at delivery:  Provider Role  "  Jeanette Alston RN Registered Nurse   Krissy Rosario RN Registered Nurse         Vaginal Counts     Initial count performed by 2 team members:  Two Team Members   GREG Ritter RN       Needles Suture Needles Sponges (RETIRED) Instruments   Initial counts 2  5    Added to count       Relief counts       Final counts 2  5          Placed during labor Accounted for at the end of labor   FSE No NA   IUPC No NA   Cervadil No NA              Final count performed by 2 team members:  Two Team Members   GREG Ritter RN      Final count correct?: Yes     Apgars    Living status: Living   1 Minute 5 Minute 10 Minute 15 Minute 20 Minute   Skin color: 1  1       Heart rate: 2  2       Reflex irritability: 2  2       Muscle tone: 2  2       Respiratory effort: 1  2       Total: 8  9       Apgars assigned by: KRISSY ROSARIO RN     Cord    Vessels: 3 Vessels    Cord Complications: None               Cord Blood Disposition: Lab    Gases Sent?: No    Delayed cord clamping?: No    Stem cell collection?: No        Resuscitation    Methods: None   Care at Delivery: .  dried and stimulated. Placed skin to skin with mother. Stable.  Output in Delivery Room: Voided      Measurements    Weight: 7 lb 11.8 oz Length: 1' 9\"   Head circumference: 33 cm    Output in delivery room: Voided     Skin to Skin and Feeding Plan    Skin to skin initiation date/time: 1841    Skin to skin with: Mother  Skin to skin end date/time:     Breastfeeding initiated date/time: 2/3/2022 2205     Labor Events and Shoulder Dystocia    Fetal Tracing Prior to Delivery: Category 1  Shoulder dystocia present?: Neg     Delivery (Maternal) (Provider to Complete) ([id number])    Episiotomy: None  Perineal lacerations: None    Periurethral laceration: right Repaired?: Yes   Repair suture: 4-0 Vicryl  Number of repair packets: 1  Genital tract inspection done: Pos     Blood Loss  Mother: Holden, " Litzy KNOWLES #[id number]   Start of Mother's Information    Delivery Blood Loss  02/03/22 2000 - 02/03/22 2340    Postpartum QBL (mL) Hospital Encounter 207 mL    Total  207 mL         End of Mother's Information  Mother: Litzy Hatch #[id number]          Delivery - Provider to Complete ([id number])    Delivering clinician: Engdahl, Cong L, CNM  CNM Care: Exclusive CNM care in labor  Attempted Delivery Types (Choose all that apply): Spontaneous Vaginal Delivery  Delivery Type (Choose the 1 that will go to the Birth History): Vaginal, Spontaneous                   Other personnel:  Provider Role   Jeanette Alston RN Registered Nurse   Krissy Mccray RN Registered Nurse                Placenta    Date/Time: 2/3/2022  9:25 PM  Removal: Spontaneous  Disposition: Hospital disposal           Anesthesia    Method: None                Presentation and Position    Presentation: Vertex    Position: Left Occiput Anterior                 Cong Alexis CNM

## 2022-02-04 NOTE — LACTATION NOTE
This note was copied from a baby's chart.  Consult for: First time breastfeeding, going well. Parents took breastfeeding class virtually through [facility].     History:  Vaginal delivery (complete and infant head visible at perineum on arrival) @ 39w0d, AGA @ 7# 11.8 ounce birthweight. Maternal history of Hashimoto's thyroiditis managed with Levothyroxine 75 mcg tabs 1 3x/week and 1.5 4x/week (per momgood control and levels stay WNL); iron deficiency anemia, GERD, dysmenorrhea.     Breast exam of mom: Soft, symmetric with intact, everted nipples bilaterally. Litzy denies any breast changes during pregnancy, not sore or sensitive at all in first trimester and no notable growth, though did change to pregnancy bra at 5 months and notes stretch marks that appeared around that time.     Oral exam of baby: Good tongue lift and extension visualized, unable to assess suck infant pursing lips and pull away sleeping at time of exam.     Feeding assessment:  Litzy latching independently with mild discomfort, nipple pinched at tip when baby came off. With permission, hands on assist to get deeper but infant had fed both sides and disinterested.     Education provided: Discussed nose to nipple positioning with good support, anatomy of breast and infant mouth, tips to get and maintain deeper latch, benefits of skin to skin and feeding on cue, supply and demand, benefits of frequent breast massage & hand expression in early days until milk is in. Reviewed  how to tell when satiated and if getting enough, what to expect in the coming days and preventing engorgement, breastfeeding log with when and who to call if concerns, [facility] pump cleaning handout and lactation resources for after discharge. They plan follow up at [facility], orient to options for LC follow up near home.     Feeding Plan: Encourage frequent skin to skin, breastfeed on cue 8 to 12 times per day, hand express after until milk is in & feed back results.  If any concerns with weight loss beyond expected or signs not getting enough, please add in pumping in addition to hand expressing. Follow up with outpatient lactation consultant within a week of discharge for support with first time breastfeeding and check in on supply (maternal risk factors, minimal if any breast changes in pregnancy).

## 2022-02-04 NOTE — PLAN OF CARE
Problem: Bleeding (Labor)  Goal: Hemostasis  Outcome: Adequate for Discharge  Intervention: Monitor for Presence of Bleeding  Flowsheets (Taken 2/3/2022 2219)   Bleed Management: uterine massage provided (delivered)  Intervention: Manage Bleeding  Flowsheets (Taken 2/3/2022 2219)   Bleed Management: uterine massage provided (delivered)     Problem: Change in Fetal Wellbeing (Labor)  Goal: Stable Fetal Wellbeing  Outcome: Adequate for Discharge  Intervention: Promote and Monitor Fetal Wellbeing  Flowsheets  Taken 2/3/2022 2219  Fetal Wellbeing Promotion:   fetal heart rate monitored   fetal heart tones checked   uterine contraction activity assessed  Taken 2/3/2022 2200  Body Position:   neutral body alignment   neutral head position     Problem: Labor Pain (Labor)  Goal: Acceptable Pain Control  Outcome: Adequate for Discharge  Intervention: Support Labor Pain Coping and Management  Flowsheets (Taken 2/3/2022 2219)  Pain Management Interventions: relaxation techniques promoted  Complementary Therapy: massage therapy performed
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
## 2022-02-04 NOTE — PLAN OF CARE
Pt is stable. Vital signs stable and FF at U/1 with small  lochia. Pt is up voiding with slight discomfort ambulating per pt. The mee area is swollen and pt is using ice pack/ tucks for comfort. Pt is breastfeeding well with minimal assist latching baby deeper. Nipples are in tact and no soreness per pt. Medicated pt with Tylenol and Ibuprofen for pain control. Encouraged pt to soak in the tub twice a day due to laceration. Checked latch, flange lips and assisted with deeper latch. Continue cares and assist with breastfeeding as needed.
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
## 2022-02-04 NOTE — PLAN OF CARE
Patient presenting to labor and delivery for: laboring. SVE performed by midwife with patient consent showing dilation and effacement complete. Monitors applied, difficult to trace due to maternal repositioning.

## 2022-02-04 NOTE — PLAN OF CARE
VSS. Postpartum checks WNL. Fundus firm at U with light bleeding. Pain controlled with tylenol and ibuprofen. Tolerating a regular diet. Voiding. Breastfeeding with minimal assist but has requested to speak with a lactation consultant; order released. Reminded patient about paperwork and videos. Support person at bedside and attentive to patient.

## 2022-02-04 NOTE — PLAN OF CARE
Patient arrived to [facility] at 0015 via wheelchair with infant in arms & accompanied by spouse/significant other. received report from BUALDO Malcolm and checked bands. Unit and room orientation completed. Call light given. No concerns at this time.

## 2022-02-05 ENCOUNTER — MYC MEDICAL ADVICE (OUTPATIENT)
Dept: FAMILY MEDICINE | Facility: CLINIC | Age: 22
End: 2022-02-05
Payer: COMMERCIAL

## 2022-02-05 VITALS
RESPIRATION RATE: 16 BRPM | HEART RATE: 77 BPM | DIASTOLIC BLOOD PRESSURE: 66 MMHG | TEMPERATURE: 98.1 F | SYSTOLIC BLOOD PRESSURE: 102 MMHG

## 2022-02-05 PROCEDURE — 250N000013 HC RX MED GY IP 250 OP 250 PS 637: Performed by: ADVANCED PRACTICE MIDWIFE

## 2022-02-05 RX ADMIN — ACETAMINOPHEN 650 MG: 325 TABLET, FILM COATED ORAL at 04:31

## 2022-02-05 RX ADMIN — ACETAMINOPHEN 650 MG: 325 TABLET, FILM COATED ORAL at 08:23

## 2022-02-05 RX ADMIN — LEVOTHYROXINE SODIUM 75 MCG: 25 TABLET ORAL at 08:22

## 2022-02-05 RX ADMIN — IBUPROFEN 800 MG: 800 TABLET ORAL at 08:23

## 2022-02-05 RX ADMIN — IBUPROFEN 800 MG: 800 TABLET ORAL at 00:05

## 2022-02-05 RX ADMIN — DOCUSATE SODIUM 100 MG: 100 CAPSULE, LIQUID FILLED ORAL at 08:23

## 2022-02-05 NOTE — PLAN OF CARE
Data: Pt is stable. The mee area is still slightly swollen. Pt is using tucks and soaking in the tub. Pt is up ambulating in the room and voiding with no problem. Vital signs stable and postpartum assessments within normal limits. Pt is breastfeeding independently and no latch was observed by staff. Both nipples are sore and tender with the initial latch per pt. Complains of tail bone pain and occasional cramping.    Action:  Pt medicated with Ibuprofen and Tylenol for pain  control. Encouraged pt to continue soaking in the tub twice a day. Encouraged to breast feed every 2-3 hours/ on demand and to hand express for baby if she is too sleepy at the breast. Review of care plan, teaching, and discharge instructions done with pt.  Infant identification with ID bands done, pt verification with signature obtained. Pt was given copies of the discharge papers and a gift for the baby.  Response: Pt states understanding and comfort with infant cares and feeding. All questions about breastfeeding was addressed. Pt is discharged home today with baby.

## 2022-02-05 NOTE — DISCHARGE SUMMARY
[facility]    Discharge Summary  Obstetrics    Date of Admission:  2/3/2022  Date of Discharge:  2022  Discharging Provider: Elvia Castillo    Discharge Diagnoses   (Z39.1) Care and examination of lactating mother  (primary encounter diagnosis)  Plan: Breast pump - Manual/Electric    (E03.8) Subclinical hypothyroidism        History of Present Illness   Litzy Hatch is a 21 year old female who presented with spontaneous labor and delivered her baby girl Mally shortly after arriving. She has had no complications with labor, delivery, or postpartum. She is ready to be discharged home. Discussed discharge instructions and answered all questions. They will have a home health nurse come tomorrow. Planning pediatrician visit Monday and will plan a visit with our lactation on Wed. Having some trouble with latching but overall doing well. Feeding frequently. Discussed her thyroid, will take 75 mcg daily which was her pre-pregnancy dose. Will recheck her levels at 6 weeks postpartum. She has all her medications at home, no discharge medications needed. Planning NFP for birth control. Will return to clinic in 6 weeks for a postpartum visit. She has discussed with us coming to check in at 2 weeks postpartum. Feels like that is unnecessary at this time. Has the support she needs and has been connecting with our behavioral health therapist which she has found really helpful. Knows she can always change her mind and come in or set up a telephone visit. No other questions or concerns at this time.     Hospital Course   The patient's hospital course was unremarkable.  She recovered as anticipated and experienced no post-delivery complications. On discharge, her pain was well controlled. Vaginal bleeding is similar to peak menstrual flow.  Voiding without difficulty.  Ambulating well and tolerating a normal diet.  No fevers.  Breastfeeding well.  Infant is stable.   She was discharged on post-partum day #2.    Post-partum hemoglobin:   Hemoglobin   Date Value Ref Range Status   2022 12.3 11.7 - 15.7 g/dL Final   2021 11.5 (L) 11.7 - 15.7 g/dL Final       [place] Depression Scale  Thoughts of Harming Self: 0-->never  Total Score: 1      LISA Farooq CNM    Discharge Disposition   Discharged to home   Condition at discharge: Stable    Primary Care Physician   Paige Mills    Consultations This Hospital Stay   LACTATION IP CONSULT  HOME CARE POST PARTUM/ IP CONSULT    Discharge Orders      Breast pump - Manual/Electric    Breast Pump Documentation:  Manual/Electric Pump: To support adequate breast milk production and nutrition for infant.     I, the undersigned, certify that the above prescribed supplies are medically necessary for this patient and is both reasonable and necessary in reference to accepted standards of medical and necessary in reference to accepted standards of medical practice in the treatment of this patient's condition and is not prescribed as a convenience.     Discharge Medications   Current Discharge Medication List      CONTINUE these medications which have NOT CHANGED    Details   azelaic acid (FINACIA) 15 % external gel       clindamycin (CLEOCIN T) 1 % external solution Apply topically 2 times daily  Qty: 30 mL, Refills: 3    Associated Diagnoses: Acne, unspecified acne type      levothyroxine (SYNTHROID/LEVOTHROID) 75 MCG tablet TAKE ONE TABLET BY MOUTH ONCE DAILY ON ,  AND . TAKE ONE AND ONE-HALF TABLET ALL OTHER DAYS  Qty: 90 tablet, Refills: 4    Associated Diagnoses: Subclinical hypothyroidism      magnesium gluconate (MAGONATE) 500 (27 Mg) MG tablet Take 500 mg by mouth 2 times daily prn      Prenatal Vit-Fe Fumarate-FA (PRENATAL MULTIVITAMIN W/IRON) 27-0.8 MG tablet Take 1 tablet by mouth daily         STOP taking these medications       ascorbic acid (VITAMIN C) 250 MG CHEW chewable  tablet Comments:   Reason for Stopping:         cyanocobalamin (VITAMIN B-12) 1000 MCG tablet Comments:   Reason for Stopping:         ferrous gluconate (FERGON) 324 (38 Fe) MG tablet Comments:   Reason for Stopping:         hydrOXYzine (ATARAX) 25 MG tablet Comments:   Reason for Stopping:         SUMAtriptan (IMITREX) 100 MG tablet Comments:   Reason for Stopping:             Allergies   Allergies   Allergen Reactions     Gluten Meal Dermatitis, Diarrhea, GI Disturbance, Itching, Nausea and Rash     LISA Farooq CNM

## 2022-02-05 NOTE — PLAN OF CARE
VSS on RA. Up ad cristofer. Fundus firm at 1 cm below umbilicus. Lochia scant. Voids spontaneously. Passing flatus, denies nausea. Cramping pain managed with Tylenol and Ibuprofen. Independent with breast feeding. Continue plan of care.

## 2022-02-05 NOTE — PLAN OF CARE
VSS and postpartum assessments WDL. Fundus firm, midline and at U/1. Scant lochia.  Up ad cristofer with steady gait. Bonding well with infant.  Breastfeeding on cue independently. Pain managed with Tylenol and Ibuprofen.  Partner present and supportive. Will continue with postpartum cares and education per plan of care.

## 2022-02-07 ENCOUNTER — TELEPHONE (OUTPATIENT)
Dept: MIDWIFE SERVICES | Facility: CLINIC | Age: 22
End: 2022-02-07
Payer: COMMERCIAL

## 2022-02-07 NOTE — TELEPHONE ENCOUNTER
Ok to schedule  for Thurs using the same day spot! Please place pt in schedule and mychart her with the arrival time or call back.   Thanks,   Dr. Hightower

## 2022-02-07 NOTE — TELEPHONE ENCOUNTER
Hello, patient is calling about a home nurse visit. She states that she has not got a call about it and was wondering on how does she go about scheduling it. Please call patient to advise.     Thank you,

## 2022-02-07 NOTE — TELEPHONE ENCOUNTER
Litzy Hi would like to see Sayda for a lactation visit on 2/9. Could you reach out to her on Monday to help her get that set up. Sayda I add you just for FYI!   Thanks! Shira         ----- Message from LISA Gilliland CNM sent at 2/4/2022  8:48 AM CST -----  Hello [facility] :)    Can we set Litzy up for a 2 wk phone mood check with one of the midwives and a 6 wks regular postpartum check with one of the midwives?    Thank you    Annabella

## 2022-02-07 NOTE — TELEPHONE ENCOUNTER
Informed mom that Dr. Hightower is out until . Please inform if you can work baby in or if she should see someone else for the  visit.

## 2022-02-07 NOTE — TELEPHONE ENCOUNTER
TC to postpartum, charge nurse Irena. She already spoke with this patient and a home care visit is scheduled. Eugenia Brown RN

## 2022-02-08 NOTE — TELEPHONE ENCOUNTER
Called pt, who is doing 2 wk mood check with new appt with SV, has lactation appt that fell under daughter's account instead of hers. 6 wk pp is scheduled as well. 2/8 SH

## 2022-02-11 ENCOUNTER — DOCUMENTATION ONLY (OUTPATIENT)
Dept: PEDIATRICS | Facility: CLINIC | Age: 22
End: 2022-02-11
Payer: COMMERCIAL

## 2022-02-11 NOTE — PROGRESS NOTES
"[facility] Lactation Visit     Assessment:      difficulty in feeding at breast     Doing well with exclusive breastfeeding. Latching well. She transferred 2.2 which is appropriate for her age. Currently -4% from birth weight, discussed with mom the expectation for gaining 1 oz per day over the next week, follow up with PCP or lactation as needed.                  Plan:        Patient Instructions      Continue to breastfeed on demand, at least 8-12 times a day.      Offer both sides every time, and alternate which breast you start on. Latch baby deeply by making a \"breast sandwich,\" and aim your nipple for the roof of the mouth. If baby's lips are rolled inward, flip the top lip out with your finger, and then apply gentle downward pressure to the chin to help the lips flange out like \"fish lips.\" If you have pain that lasts beyond the initial latch-on, always restart. When sucking/swallowing frequency starts to slow down, do breast compressions/massage and tickle baby's feet to keep her alert with feeding. A diaper change between sides can be helpful to keep her alert.     Supplementation plan: No need for routine supplementation       Recommended to pump: No need for routine pumping. You may want to start pumping once per day around 4-6 weeks      Continue to monitor output, expect at least 6 wet diapers per day.         Return in about 2 weeks (around 2022) for As needed for ongoing lactation support .        Average Infant Milk Intake by Age     Age Average milk volume per feeding (mL) Average milk volume per feeding (oz) Average 24 hour milk intake (mL) Average 24 hour milk intake (oz)   Day 1 Few drops - 5mL < tsp Up to 30 mL Up to 1 oz   Day 2 5 - 15 mL <0.5 oz - 1 TB 30 - 120 mL 1 - 4 oz   Day 3 15 - 30 mL  0.5 - 1 oz 120 - 240 mL 4 - 8 oz   Day 4 30 - 45 mL  1 - 1.5 oz 240 - 360 mL 8 - 12 oz   Day 5-7 45 - 60 mL 1.5 - 2 oz 360 - 600 mL 12 - 18 oz   Week 2-3 60 - 90 mL 2 - 3 oz 450 - " 750 mL 15 - 25 oz   Months 1-6 90 - 150 mL 3 - 5 oz 750 - 1035 mL 25 - 35 oz            Vitamin D is essential for healthy bone growth and immune function.     We recommend that all breast fed babies take 400 international unit(s) of vitamin D daily. This is available over the counter either in a concentrated drop or 1 mL dose- read the instructions so you know you are giving the correct dose.      If it is hard to remember to give the vitamin D, moms can take a supplement themselves to ensure that adequate amounts transfer through breast milk. Mom's dose would be 6,400 international unit(s)/day. It is not a bad idea to askyour doctor to check your level, especially if this has never been done before, so that you are confident that you are taking the correct amount for YOU.               Return in about 2 weeks (around 2/25/2022) for As needed for ongoing lactation support .        SUBJECTIVE:      Mally is here today with mom, Chely, and dad, Erik, for lactation support. She is a 8 day old female born at Gestational Age: 39w0d now 8 days.    She is doing well. She has gained 3 oz since last visit 7 days ago. She has gained approximately 0.4 oz per day over the past 7 days and is now -4% from birth weight.         Baby is nursing every 3 -3.5 hours for about 25 minutes on the first side, 15 minutes on the second side. She seems content after this.   Mother reports hearing audible swallows.   Baby feeds about 8 times in 24 hours.   Baby is not currently supplemented.   Mom is not currently pumping. When her milk came in on day 3, she was getting 1.5 oz in the haakaa at each feeding.    Number of wet diapers in 24 hours: 8  Number of stools in 24 hours: 5  Color and consistency of stools: yellow, seedy  Mom noticed her breasts grew slightly larger and areolas darkened during pregnancy and she noticed primary engorgement when her milk came in on day 3.      Mom does have hypothyroidism. She is taking synthroid and  TSH has been in a good range        Breastfeeding Goals: Hoping to nurse about 8 - 12 months     Previous Breastfeeding Experience: First baby   Breast-surgery:  None  Maternal medications: synthroid, magnesium 500 mg/day, fish oil, PNV, probiotic  Maternal Health conditions: hypothyroid     Hospital course:  No complications     No results found for any visits on 02/11/22.  No current outpatient medications on file.  Past Medical History   History reviewed. No pertinent past medical history.     Past Surgical History   History reviewed. No pertinent surgical history.     Family History   No family history on file.           Primary care provider: La Nena Hightower     OBJECTIVE:     Mother:   Nipples are everted, the areola is compressible, the breast is soft and full.      Sore nipples: mild with initial latch, resolves.         Infant:      Age today: 8 days     Temp 98.8  F (37.1  C) (Axillary)   Wt 7 lb 6.4 oz (3.357 kg)   BMI 12.69 kg/m          Weight:       Wt Readings from Last 3 Encounters:   02/11/22 7 lb 6.4 oz (3.357 kg) (40 %, Z= -0.26)*   02/11/22 7 lb 7.5 oz (3.388 kg) (42 %, Z= -0.20)*   02/04/22 7 lb 4.2 oz (3.294 kg) (53 %, Z= 0.07)*      * Growth percentiles are based on WHO (Girls, 0-2 years) data.         Birthweight:  7 lbs 11.81 oz.   Today's weight:  7 lbs 6.4 oz This is -4% from birth weight.         Test weights:        LEFT side: 1 oz  RIGHT side: 1.2 oz     TOTAL transfer:  2.2 oz         Feeding assessment:      Digital suck assessment: Infant draws consultant's finger into mouth, palate intact, tongue over gums, normal frenulum.      Baby can hold suction with tongue while at the breast.      Alignment: Baby's head was aligned with its trunk. Baby did face mother. Baby was in cross cradle position today.      Areolar Grasp: Baby was able to open mouth wide. Baby's lips were not pursed. Baby's lips did flange outward. Tongue was visible just barely over bottom lip. Baby had  complete seal.      Areolar Compression: Baby made rhythmic motion. There were no clicking or smacking sounds. There was no severe nipple discomfort.  Nipples appeared round after feeding.     Audible swallowing: Baby made quiet sounds of swallowing: There was an increase in frequency after milk ejection reflex. The milk ejection reflex is appropriate and milk supply appears adequate.      PHYSICAL EXAM     Gen: Alert, no acute distress.   Head: Anterior fontanelle flat and soft.   Mouth:Lips pink. Oral mucosa moist. Tongue midline (good lateralization, movement, and lift; able to extend pass lower gumline).  Palate intact. Coordinated suck.  Lungs: Clear to auscultation bilaterally.   Cardiac: Regular regular rate and rhythm, S1S2, no murmurs.  Abdomen: Soft, nontender, bowel sounds present, no hepatosplenomegaly or mass palpable. Umbilicus dry with no erythema or drainage.   : Parish stage 1 female genitalia  Skin: Intact, dry, appropriate coloring for ethnicity, no jaundice.   Neuro: Appropriate muscle tone.     The visit lasted a total of 60 minutes that I spent on this visit today. This time includes pre-charting, review of the chart, and face to face time with the patient.      Completed by:   ASHISH Major, IBCLC, [facility], Pediatrics.  2/11/2022 1:07 PM

## 2022-02-17 ENCOUNTER — VIRTUAL VISIT (OUTPATIENT)
Dept: BEHAVIORAL HEALTH | Facility: CLINIC | Age: 22
End: 2022-02-17
Payer: COMMERCIAL

## 2022-02-17 DIAGNOSIS — F43.23 ADJUSTMENT DISORDER WITH MIXED ANXIETY AND DEPRESSED MOOD: Primary | ICD-10-CM

## 2022-02-17 PROCEDURE — 90834 PSYTX W PT 45 MINUTES: CPT | Mod: 95 | Performed by: SOCIAL WORKER

## 2022-02-17 NOTE — PROGRESS NOTES
[facility]- Integrated Behavioral Health Services  February 17, 2022    Behavioral Health Clinician Progress Note    Patient Name: Litzy Hatch      Telemedicine Visit: The patient's condition can be safely assessed and treated via synchronous audio and visual telemedicine encounter.      Reason for Telemedicine Visit: Patient has requested telehealth visit    Originating Site (Patient Location): Patient's home    Distant Site (Provider Location): [facility]    Consent:  The patient/guardian has verbally consented to: the potential risks and benefits of telemedicine (video visit) versus in person care; bill my insurance or make self-payment for services provided; and responsibility for payment of non-covered services.     Mode of Communication:  Video Conference via [email or web address]    As the provider I attest to compliance with applicable laws and regulations related to telemedicine.         Service Type:  Individual     Session Start Time: 11:33 am  Session End Time: 12:25 pm       Session Length: 38 - 52      Attendees: Patient    Visit Activities (Refresh list every visit): [facility] Only    Diagnostic Assessment Date: 1/21/22  Treatment Plan Review Date: To be completed after patient transitions postpartum to determine needs and appropriate goals.   See Flowsheets for today's PHQ-9 and CHRISTINA-7 results  Previous PHQ-9: No flowsheet data found.  Previous CHRISTINA-7: No flowsheet data found.    JONNATHAN LEVEL:  No flowsheet data found.    DATA  Extended Session (60+ minutes): No  Interactive Complexity: No  Crisis: No  [facility] Patient: No    Treatment Objective(s) Addressed in This Session:  Target Behavior(s): disease management/lifestyle changes , mental health management    Anxiety: will experience a reduction in anxiety and will develop more effective coping skills to manage anxiety symptoms  Adjustment Difficulties: will develop  coping/problem-solving skills to facilitate more adaptive adjustment    Current Stressors / Issues:  Patient reported overall she is doing well. Patient is currently 2 weeks postpartum. She shared that her delivery was a positive and empowering experience. She reflected upon the labor and home, her drive to the hospital, and the immediate delivery upon arrival to the hospital.  Patient continued to process through her experiences breastfeeding, the initial challenges, but now falling into routine which she appreciates and enjoys. Patient stated that they continue to navigate how to best manage visitors. She appreciates that people are not inviting themselves over, but she can sometimes worry that people feel left out or excluded.  Patient open to exploring how to continue conversations with her family to navigate this aspect. Patient stated overall comfort with people in her home, denied anxiety.  She reflected upon an experience when her daughter had a rash on her face in addition to another  skin challenges. Patient expressed challenges seeing her in that way and was open to identifying ways to process through these moments. Patient denied overall concerns with her mood. She stated that she is able to communicate with her  about what she needs. She stated that she is thinking ahead to her transition back to work in  as she worries about what it may look like.  Validated and normalized the presence of these thoughts as she appreciates the time at home she has with her daughter and begins to adjust to her multiple roles and identifies.      Progress on Treatment Objective(s) / Homework:  Stable - MAINTENANCE (Working to maintain change, with risk of relapse); Intervened by continuing to positively reinforce healthy behavior choice     Motivational Interviewing    MI Intervention: Expressed Empathy/Understanding, Open-ended questions, Reflections: simple and complex, Change talk (evoked) and Reframe      Change Talk Expressed by the Patient: Desire to change Ability to change Reasons to change    Provider Response to Change Talk: E - Evoked more info from patient about behavior change, A - Affirmed patient's thoughts, decisions, or attempts at behavior change, R - Reflected patient's change talk and S - Summarized patient's change talk statements    Cognitive Behavioral Therapy: Discussed connection between thoughts, feelings, and behaviors. Taught patient how to identify cognitive distortions, and assisted patient to identify own cognitive distortions. Taught and engaged patient in cognitive restructuring techniques.    Also provided psychoeducation about behavioral health condition, symptoms, and treatment options    Care Plan review completed: No    Medication Review:  No current psychiatric medications prescribed    Medication Compliance:  NA    Changes in Health Issues:   None reported    Chemical Use Review:   Substance Use: Chemical use reviewed, no active concerns identified      Tobacco Use: No current tobacco use.      Assessment: Current Emotional / Mental Status (status of significant symptoms):  Risk status (Self / Other harm or suicidal ideation)  Patient denies a history of suicidal ideation, suicide attempts, self-injurious behavior, homicidal ideation, homicidal behavior and and other safety concerns  Patient denies current fears or concerns for personal safety.  Patient denies current or recent suicidal ideation or behaviors.  Patient denies current or recent homicidal ideation or behaviors.  Patient denies current or recent self injurious behavior or ideation.  Patient denies other safety concerns.  A safety and risk management plan has not been developed at this time, however patient was encouraged to call [place] [phone] should there be a change in any of these risk factors.    Appearance:   Appropriate   Eye Contact:   Good   Psychomotor Behavior: Normal   Attitude:   Cooperative    Orientation:   All  Speech   Rate / Production: Normal    Volume:  Normal   Mood:    Normal  Affect:    Appropriate   Thought Content:  Clear   Thought Form:  Coherent  Logical   Insight:    Good     Diagnoses:  1. Adjustment disorder with mixed anxiety and depressed mood        Collateral Reports Completed:  Not Applicable    Plan: (Homework, other):  Patient was given information about behavioral services and encouraged to schedule a follow up appointment with the clinic [facility] before return to work. Scheduled for early May, with patient reaching out sooner as needed She was also given CBT techniques to manage anxieites and worries.  CD Recommendations: No indications of CD issues.  PAPITO Serrano, [facility]      ______________________________________________________________________        PAPITO Serrano  February 17, 2022

## 2022-02-28 ENCOUNTER — TELEPHONE (OUTPATIENT)
Dept: MIDWIFE SERVICES | Facility: CLINIC | Age: 22
End: 2022-02-28
Payer: COMMERCIAL

## 2022-02-28 NOTE — TELEPHONE ENCOUNTER
"Litzy calling today to report vaginal bleeding.  2/3, did not have significant bleeding after delivery however noticed increased bleeding on Friday, passed two \"smaller than a golf ball\" sized clots. No bleeding Saturday however started passing small clots and bleeding on  and through the night into this morning. \"Soaked\" two pads last night and has already passed 3 small clots this morning. Wondering if bleeding like this is normal after 2 weeks postpartum as she has not had bleeding like this. Does note that she \"feels like it might be slowing down now\" however is still needing a \"large pad.\" Routing to [facility] for advice, RN assured Litzy that at this point it is not concerning however would like [facility] recommendation.   Serenity Blood RN   "

## 2022-02-28 NOTE — TELEPHONE ENCOUNTER
Agree with advice. Monitor for now. Reasons to present to ED for evaluation: if soaking a pad in less than an hour, clots continue or increase in amount or if showing signs of infection (fever, chills, bodyaches, uterine tenderness or foul smelling vaginal discharge). Main concern is retained products of conception. If bleeding continues but doesn't feel like she needs to be evaluated in ED can order pelvic US to be done in clinic with 6 week postpartum visit. Please notify patient. Cristina Lilly CNM

## 2022-02-28 NOTE — TELEPHONE ENCOUNTER
Last night was heavy bleeding and out of town. This morning slowed down significantly. Patient will reach out if US is needed or if bleeding continues. Bleeding precautions were provided.   No [facility] RN

## 2022-03-01 ENCOUNTER — APPOINTMENT (OUTPATIENT)
Dept: ULTRASOUND IMAGING | Facility: CLINIC | Age: 22
End: 2022-03-01
Attending: EMERGENCY MEDICINE
Payer: COMMERCIAL

## 2022-03-01 ENCOUNTER — HOSPITAL ENCOUNTER (OUTPATIENT)
Facility: CLINIC | Age: 22
Discharge: HOME | End: 2022-03-01
Attending: EMERGENCY MEDICINE | Admitting: OBSTETRICS & GYNECOLOGY
Payer: COMMERCIAL

## 2022-03-01 ENCOUNTER — ANESTHESIA EVENT (OUTPATIENT)
Dept: SURGERY | Facility: CLINIC | Age: 22
End: 2022-03-01
Payer: COMMERCIAL

## 2022-03-01 ENCOUNTER — ANESTHESIA (OUTPATIENT)
Dept: SURGERY | Facility: CLINIC | Age: 22
End: 2022-03-01
Payer: COMMERCIAL

## 2022-03-01 VITALS
TEMPERATURE: 97 F | OXYGEN SATURATION: 97 % | HEART RATE: 68 BPM | RESPIRATION RATE: 18 BRPM | DIASTOLIC BLOOD PRESSURE: 67 MMHG | SYSTOLIC BLOOD PRESSURE: 96 MMHG

## 2022-03-01 LAB
ABO/RH(D): NORMAL
ANION GAP SERPL CALCULATED.3IONS-SCNC: 11 MMOL/L (ref 5–18)
ANTIBODY SCREEN: NEGATIVE
APTT PPP: 29 SECONDS (ref 22–38)
BASOPHILS # BLD AUTO: 0.1 10E3/UL (ref 0–0.2)
BASOPHILS NFR BLD AUTO: 1 %
BUN SERPL-MCNC: 11 MG/DL (ref 8–22)
CALCIUM SERPL-MCNC: 9 MG/DL (ref 8.5–10.5)
CHLORIDE BLD-SCNC: 106 MMOL/L (ref 98–107)
CO2 SERPL-SCNC: 24 MMOL/L (ref 22–31)
CREAT SERPL-MCNC: 0.76 MG/DL (ref 0.6–1.1)
EOSINOPHIL # BLD AUTO: 0.1 10E3/UL (ref 0–0.7)
EOSINOPHIL NFR BLD AUTO: 2 %
ERYTHROCYTE [DISTWIDTH] IN BLOOD BY AUTOMATED COUNT: 12.6 % (ref 10–15)
GFR SERPL CREATININE-BSD FRML MDRD: >90 ML/MIN/1.73M2
GLUCOSE BLD-MCNC: 85 MG/DL (ref 70–125)
HCT VFR BLD AUTO: 34.8 % (ref 35–47)
HGB BLD-MCNC: 11.3 G/DL (ref 11.7–15.7)
HOLD SPECIMEN: NORMAL
HOLD SPECIMEN: NORMAL
IMM GRANULOCYTES # BLD: 0 10E3/UL
IMM GRANULOCYTES NFR BLD: 0 %
INR PPP: 1.02 (ref 0.85–1.15)
LYMPHOCYTES # BLD AUTO: 1.7 10E3/UL (ref 0.8–5.3)
LYMPHOCYTES NFR BLD AUTO: 20 %
MAGNESIUM SERPL-MCNC: 2.1 MG/DL (ref 1.8–2.6)
MCH RBC QN AUTO: 29.6 PG (ref 26.5–33)
MCHC RBC AUTO-ENTMCNC: 32.5 G/DL (ref 31.5–36.5)
MCV RBC AUTO: 91 FL (ref 78–100)
MONOCYTES # BLD AUTO: 0.5 10E3/UL (ref 0–1.3)
MONOCYTES NFR BLD AUTO: 5 %
NEUTROPHILS # BLD AUTO: 6.3 10E3/UL (ref 1.6–8.3)
NEUTROPHILS NFR BLD AUTO: 72 %
NRBC # BLD AUTO: 0 10E3/UL
NRBC BLD AUTO-RTO: 0 /100
PLATELET # BLD AUTO: 362 10E3/UL (ref 150–450)
POTASSIUM BLD-SCNC: 4 MMOL/L (ref 3.5–5)
RBC # BLD AUTO: 3.82 10E6/UL (ref 3.8–5.2)
SARS-COV-2 RNA RESP QL NAA+PROBE: NEGATIVE
SODIUM SERPL-SCNC: 141 MMOL/L (ref 136–145)
SPECIMEN EXPIRATION DATE: NORMAL
WBC # BLD AUTO: 8.7 10E3/UL (ref 4–11)

## 2022-03-01 PROCEDURE — 272N000001 HC OR GENERAL SUPPLY STERILE: Performed by: OBSTETRICS & GYNECOLOGY

## 2022-03-01 PROCEDURE — 88305 TISSUE EXAM BY PATHOLOGIST: CPT | Mod: TC | Performed by: OBSTETRICS & GYNECOLOGY

## 2022-03-01 PROCEDURE — 710N000012 HC RECOVERY PHASE 2, PER MINUTE: Performed by: OBSTETRICS & GYNECOLOGY

## 2022-03-01 PROCEDURE — 96360 HYDRATION IV INFUSION INIT: CPT

## 2022-03-01 PROCEDURE — 80048 BASIC METABOLIC PNL TOTAL CA: CPT | Performed by: EMERGENCY MEDICINE

## 2022-03-01 PROCEDURE — 99285 EMERGENCY DEPT VISIT HI MDM: CPT | Mod: 25

## 2022-03-01 PROCEDURE — 86901 BLOOD TYPING SEROLOGIC RH(D): CPT | Performed by: EMERGENCY MEDICINE

## 2022-03-01 PROCEDURE — 36415 COLL VENOUS BLD VENIPUNCTURE: CPT | Performed by: EMERGENCY MEDICINE

## 2022-03-01 PROCEDURE — 93005 ELECTROCARDIOGRAM TRACING: CPT | Performed by: EMERGENCY MEDICINE

## 2022-03-01 PROCEDURE — 86850 RBC ANTIBODY SCREEN: CPT | Performed by: EMERGENCY MEDICINE

## 2022-03-01 PROCEDURE — 250N000013 HC RX MED GY IP 250 OP 250 PS 637: Performed by: OBSTETRICS & GYNECOLOGY

## 2022-03-01 PROCEDURE — 87635 SARS-COV-2 COVID-19 AMP PRB: CPT | Performed by: EMERGENCY MEDICINE

## 2022-03-01 PROCEDURE — 85025 COMPLETE CBC W/AUTO DIFF WBC: CPT | Performed by: EMERGENCY MEDICINE

## 2022-03-01 PROCEDURE — 258N000003 HC RX IP 258 OP 636: Performed by: EMERGENCY MEDICINE

## 2022-03-01 PROCEDURE — 250N000009 HC RX 250: Performed by: OBSTETRICS & GYNECOLOGY

## 2022-03-01 PROCEDURE — 83735 ASSAY OF MAGNESIUM: CPT | Performed by: EMERGENCY MEDICINE

## 2022-03-01 PROCEDURE — 370N000017 HC ANESTHESIA TECHNICAL FEE, PER MIN: Performed by: OBSTETRICS & GYNECOLOGY

## 2022-03-01 PROCEDURE — 360N000075 HC SURGERY LEVEL 2, PER MIN: Performed by: OBSTETRICS & GYNECOLOGY

## 2022-03-01 PROCEDURE — 76856 US EXAM PELVIC COMPLETE: CPT

## 2022-03-01 PROCEDURE — C9803 HOPD COVID-19 SPEC COLLECT: HCPCS

## 2022-03-01 PROCEDURE — 258N000003 HC RX IP 258 OP 636: Performed by: NURSE ANESTHETIST, CERTIFIED REGISTERED

## 2022-03-01 PROCEDURE — 250N000009 HC RX 250: Performed by: NURSE ANESTHETIST, CERTIFIED REGISTERED

## 2022-03-01 PROCEDURE — 250N000011 HC RX IP 250 OP 636: Performed by: NURSE ANESTHETIST, CERTIFIED REGISTERED

## 2022-03-01 PROCEDURE — 85610 PROTHROMBIN TIME: CPT | Performed by: EMERGENCY MEDICINE

## 2022-03-01 PROCEDURE — 85730 THROMBOPLASTIN TIME PARTIAL: CPT | Performed by: EMERGENCY MEDICINE

## 2022-03-01 RX ORDER — OXYCODONE HYDROCHLORIDE 5 MG/1
5 TABLET ORAL EVERY 4 HOURS PRN
Status: DISCONTINUED | OUTPATIENT
Start: 2022-03-01 | End: 2022-03-04 | Stop reason: HOSPADM

## 2022-03-01 RX ORDER — SODIUM CHLORIDE, SODIUM LACTATE, POTASSIUM CHLORIDE, CALCIUM CHLORIDE 600; 310; 30; 20 MG/100ML; MG/100ML; MG/100ML; MG/100ML
INJECTION, SOLUTION INTRAVENOUS CONTINUOUS PRN
Status: DISCONTINUED | OUTPATIENT
Start: 2022-03-01 | End: 2022-03-01

## 2022-03-01 RX ORDER — SODIUM CHLORIDE 9 MG/ML
INJECTION, SOLUTION INTRAVENOUS ONCE
Status: COMPLETED | OUTPATIENT
Start: 2022-03-01 | End: 2022-03-01

## 2022-03-01 RX ORDER — AMOXICILLIN 250 MG
1-2 CAPSULE ORAL 2 TIMES DAILY
Qty: 30 TABLET | Refills: 0 | Status: SHIPPED | OUTPATIENT
Start: 2022-03-01 | End: 2022-10-04

## 2022-03-01 RX ORDER — HYDROMORPHONE HCL IN WATER/PF 6 MG/30 ML
0.2 PATIENT CONTROLLED ANALGESIA SYRINGE INTRAVENOUS EVERY 5 MIN PRN
Status: DISCONTINUED | OUTPATIENT
Start: 2022-03-01 | End: 2022-03-04 | Stop reason: HOSPADM

## 2022-03-01 RX ORDER — IBUPROFEN 400 MG/1
800 TABLET, FILM COATED ORAL ONCE
Status: DISCONTINUED | OUTPATIENT
Start: 2022-03-02 | End: 2022-03-04 | Stop reason: HOSPADM

## 2022-03-01 RX ORDER — DEXAMETHASONE SODIUM PHOSPHATE 10 MG/ML
INJECTION, SOLUTION INTRAMUSCULAR; INTRAVENOUS PRN
Status: DISCONTINUED | OUTPATIENT
Start: 2022-03-01 | End: 2022-03-01

## 2022-03-01 RX ORDER — LIDOCAINE HYDROCHLORIDE 10 MG/ML
INJECTION, SOLUTION INFILTRATION; PERINEURAL PRN
Status: DISCONTINUED | OUTPATIENT
Start: 2022-03-01 | End: 2022-03-01

## 2022-03-01 RX ORDER — LIDOCAINE 40 MG/G
CREAM TOPICAL
Status: DISCONTINUED | OUTPATIENT
Start: 2022-03-01 | End: 2022-03-04 | Stop reason: HOSPADM

## 2022-03-01 RX ORDER — SODIUM CHLORIDE, SODIUM LACTATE, POTASSIUM CHLORIDE, CALCIUM CHLORIDE 600; 310; 30; 20 MG/100ML; MG/100ML; MG/100ML; MG/100ML
INJECTION, SOLUTION INTRAVENOUS CONTINUOUS
Status: DISCONTINUED | OUTPATIENT
Start: 2022-03-01 | End: 2022-03-04 | Stop reason: HOSPADM

## 2022-03-01 RX ORDER — IBUPROFEN 800 MG/1
800 TABLET, FILM COATED ORAL EVERY 6 HOURS PRN
Qty: 30 TABLET | Refills: 0 | Status: SHIPPED | OUTPATIENT
Start: 2022-03-01 | End: 2022-10-04

## 2022-03-01 RX ORDER — MAGNESIUM HYDROXIDE 1200 MG/15ML
LIQUID ORAL PRN
Status: DISCONTINUED | OUTPATIENT
Start: 2022-03-01 | End: 2022-03-04 | Stop reason: HOSPADM

## 2022-03-01 RX ORDER — CEFAZOLIN SODIUM 2 G/100ML
INJECTION, SOLUTION INTRAVENOUS PRN
Status: DISCONTINUED | OUTPATIENT
Start: 2022-03-01 | End: 2022-03-01

## 2022-03-01 RX ORDER — ONDANSETRON 2 MG/ML
4 INJECTION INTRAMUSCULAR; INTRAVENOUS EVERY 30 MIN PRN
Status: DISCONTINUED | OUTPATIENT
Start: 2022-03-01 | End: 2022-03-04 | Stop reason: HOSPADM

## 2022-03-01 RX ORDER — MEPERIDINE HYDROCHLORIDE 25 MG/ML
12.5 INJECTION INTRAMUSCULAR; INTRAVENOUS; SUBCUTANEOUS
Status: DISCONTINUED | OUTPATIENT
Start: 2022-03-01 | End: 2022-03-04 | Stop reason: HOSPADM

## 2022-03-01 RX ORDER — PROPOFOL 10 MG/ML
INJECTION, EMULSION INTRAVENOUS CONTINUOUS PRN
Status: DISCONTINUED | OUTPATIENT
Start: 2022-03-01 | End: 2022-03-01

## 2022-03-01 RX ORDER — ONDANSETRON 2 MG/ML
INJECTION INTRAMUSCULAR; INTRAVENOUS PRN
Status: DISCONTINUED | OUTPATIENT
Start: 2022-03-01 | End: 2022-03-01

## 2022-03-01 RX ORDER — FENTANYL CITRATE 50 UG/ML
25 INJECTION, SOLUTION INTRAMUSCULAR; INTRAVENOUS EVERY 5 MIN PRN
Status: DISCONTINUED | OUTPATIENT
Start: 2022-03-01 | End: 2022-03-04 | Stop reason: HOSPADM

## 2022-03-01 RX ORDER — FENTANYL CITRATE 50 UG/ML
25 INJECTION, SOLUTION INTRAMUSCULAR; INTRAVENOUS
Status: DISCONTINUED | OUTPATIENT
Start: 2022-03-01 | End: 2022-03-04 | Stop reason: HOSPADM

## 2022-03-01 RX ORDER — ONDANSETRON 4 MG/1
4 TABLET, ORALLY DISINTEGRATING ORAL EVERY 30 MIN PRN
Status: DISCONTINUED | OUTPATIENT
Start: 2022-03-01 | End: 2022-03-04 | Stop reason: HOSPADM

## 2022-03-01 RX ORDER — FENTANYL CITRATE 50 UG/ML
INJECTION, SOLUTION INTRAMUSCULAR; INTRAVENOUS PRN
Status: DISCONTINUED | OUTPATIENT
Start: 2022-03-01 | End: 2022-03-01

## 2022-03-01 RX ORDER — ACETAMINOPHEN 325 MG/1
975 TABLET ORAL ONCE
Status: DISCONTINUED | OUTPATIENT
Start: 2022-03-02 | End: 2022-03-04 | Stop reason: HOSPADM

## 2022-03-01 RX ORDER — ACETAMINOPHEN 325 MG/1
975 TABLET ORAL ONCE
Status: COMPLETED | OUTPATIENT
Start: 2022-03-01 | End: 2022-03-01

## 2022-03-01 RX ORDER — KETOROLAC TROMETHAMINE 30 MG/ML
INJECTION, SOLUTION INTRAMUSCULAR; INTRAVENOUS PRN
Status: DISCONTINUED | OUTPATIENT
Start: 2022-03-01 | End: 2022-03-01

## 2022-03-01 RX ADMIN — CEFAZOLIN SODIUM 2 G: 2 INJECTION, SOLUTION INTRAVENOUS at 21:18

## 2022-03-01 RX ADMIN — FENTANYL CITRATE 50 MCG: 50 INJECTION, SOLUTION INTRAMUSCULAR; INTRAVENOUS at 21:03

## 2022-03-01 RX ADMIN — SODIUM CHLORIDE, POTASSIUM CHLORIDE, SODIUM LACTATE AND CALCIUM CHLORIDE: 600; 310; 30; 20 INJECTION, SOLUTION INTRAVENOUS at 21:02

## 2022-03-01 RX ADMIN — KETOROLAC TROMETHAMINE 30 MG: 30 INJECTION, SOLUTION INTRAMUSCULAR at 21:33

## 2022-03-01 RX ADMIN — LIDOCAINE HYDROCHLORIDE 30 MG: 10 INJECTION, SOLUTION INFILTRATION; PERINEURAL at 21:06

## 2022-03-01 RX ADMIN — ACETAMINOPHEN 975 MG: 325 TABLET ORAL at 22:09

## 2022-03-01 RX ADMIN — PROPOFOL 200 MCG/KG/MIN: 10 INJECTION, EMULSION INTRAVENOUS at 21:06

## 2022-03-01 RX ADMIN — DEXAMETHASONE SODIUM PHOSPHATE 10 MG: 10 INJECTION, SOLUTION INTRAMUSCULAR; INTRAVENOUS at 21:16

## 2022-03-01 RX ADMIN — ONDANSETRON 4 MG: 2 INJECTION INTRAMUSCULAR; INTRAVENOUS at 21:16

## 2022-03-01 RX ADMIN — SODIUM CHLORIDE: 9 INJECTION, SOLUTION INTRAVENOUS at 20:03

## 2022-03-01 RX ADMIN — FENTANYL CITRATE 50 MCG: 50 INJECTION, SOLUTION INTRAMUSCULAR; INTRAVENOUS at 21:06

## 2022-03-01 ASSESSMENT — ENCOUNTER SYMPTOMS
ABDOMINAL PAIN: 1
BRUISES/BLEEDS EASILY: 0
MYALGIAS: 0
FEVER: 1
CHILLS: 1
WOUND: 0
CONFUSION: 0
LIGHT-HEADEDNESS: 1
DIARRHEA: 1
SHORTNESS OF BREATH: 0

## 2022-03-01 NOTE — ED TRIAGE NOTES
Pt presents to the ED from home via EMS. Pt is 3 weeks post partum  NSD at 38 weeks, no complications. On Friday pt started bleeding with clots. Pt reports bleeding stopped. Then bleeding started up on  for about 10 hours passing clots size of golf balls. Pt reports cramping. Today the bleeding started again and pt is very light headed.

## 2022-03-01 NOTE — TELEPHONE ENCOUNTER
Patient called in to notify her care team that she is heavy bleeding and dizzy and weak, patient is going to the ED.  Will send as FYI to provider.   No [facility] RN
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
## 2022-03-02 ASSESSMENT — ACTIVITIES OF DAILY LIVING (ADL)
ADLS_ACUITY_SCORE: 12

## 2022-03-02 NOTE — ANESTHESIA POSTPROCEDURE EVALUATION
Patient: Litzy Hatch    Procedure: Procedure(s):  DILATION AND CURETTAGE, UTERUS, USING SUCTION       Anesthesia Type:  MAC    Note:  Disposition: Inpatient   Postop Pain Control: Uneventful            Sign Out: Well controlled pain   PONV: No   Neuro/Psych: Uneventful            Sign Out: Acceptable/Baseline neuro status   Airway/Respiratory: Uneventful            Sign Out: Acceptable/Baseline resp. status   CV/Hemodynamics: Uneventful            Sign Out: Acceptable CV status; No obvious hypovolemia; No obvious fluid overload   Other NRE: NONE   DID A NON-ROUTINE EVENT OCCUR? No           Last vitals:  Vitals Value Taken Time   BP 96/67 03/01/22 2200   Temp 36.1  C (97  F) 03/01/22 2200   Pulse 64 03/01/22 2219   Resp 24 03/01/22 2218   SpO2 100 % 03/01/22 2219   Vitals shown include unvalidated device data.    Electronically Signed By: Jean Wiseman MD  March 2, 2022  6:26 AM

## 2022-03-02 NOTE — OP NOTE
PROCEDURE NOTE - SUCTION D & C    NAME: Litzy Hatch  : 2000  MRN: [id number]     DATE OF SERVICE: 3/1/2022     PREOPERATIVE DIAGNOSIS: Retained products of conceptions    POSTOPERATIVE DIAGNOSIS: Retained products of conceptions    PROCEDURE: Suction dilatation and curettage    SURGEON: Mariposa Manuel MD    ANESTHESIA: MAC    ESTIMATED BLOOD LOSS: 5 cc    SPECIMENS: Uterine contents, sent to pathology    COMPLICATIONS: None.     HISTORY OF PRESENT ILLNESS:  This is a 21 year old female with vaginal bleeding and retained tissue after a . The risks, benefits and alternatives to the procedure were discussed with her at length.  She expressed understanding and wished to proceed.     PROCEDURE NOTE:  Patient was brought to the operating room and after induction of anesthesia was prepped and draped in the dorsal lithotomy position.  A time out was called and the patient and the procedure were verified.  A bimanual exam was done.  Uterus was noted to be 8-10 weeks gestation. The bladder was drained of urine.  A sterile speculum was placed.  The anterior lip of the cervix was grasped with a single tooth tenaculum. The cervix was dilated. I was easily able to pass a 10 Elena dilator.  A # 10 Micronesian suction curette was induced and rotated to clear the uterus of all products of conception.  A sharp curette was then performed. The uterus was gritty in all 4 quadrants. Once it was felt that all tissue was removed from all quadrants a decision was made to terminate the procedure. Sponge and instrument counts were correct. Patient tolerated the procedure well and was taken to the recovery room in good condition.    Mariposa Manuel MD    CC: Paige Mills Kathryn Burns Grande, MD
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
## 2022-03-02 NOTE — ED NOTES
Has NS running at 125 ml/ hr. Has been NPO while here. She denied pain. telemetry NSR. Still reporting vaginal bleeding but she states bleeding has lessened. Patient is breastfeeding. Pump obtained from OBGYN.   Cisco Escobar RN  3/1/2022  7:16 PM

## 2022-03-02 NOTE — DISCHARGE INSTRUCTIONS
Discharge Instructions for Dilation and Curettage (D and C)   You had a dilation and curettage (D&C). The reasons for having this procedure vary. It may be done to control heavy uterine bleeding or to find the cause of irregular bleeding. It may also be done to remove pregnancy tissue after an  or miscarriage.   Home care    Take it easy. Rest for 2 days as needed.    Go back to your normal activities after 24 to 48 hours. You may also return to work at that time.    Eat a normal diet.    Take an over-the-counter pain reliever if needed.    It s OK to have bleeding for about a week after the D&C. The amount of bleeding should be similar to what you have during a normal period.    Don t drive for 24 hours after the D&C unless your provider says it's OK.    Don t have sex or use tampons or douches until your healthcare provider says it s safe.    Follow-up    Make a follow-up appointment, or as advised.    When to call your healthcare provider  Call your healthcare provider right away if you have any of these:     Bleeding that soaks more than 1 sanitary pad in 1 hour    Severe belly pain    Severe cramps    Fever of 100.4 F ( 38.0 C) or higher, or as directed by your provider    A bad-smelling vaginal discharge  Lakisha last reviewed this educational content on 2020-2021 [employer]. All rights reserved. This information is not intended as a substitute for professional medical care. Always follow your healthcare professional's instructions.
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
## 2022-03-02 NOTE — ANESTHESIA CARE TRANSFER NOTE
Patient: Litzy Hatch    Procedure: Procedure(s):  DILATION AND CURETTAGE, UTERUS, USING SUCTION       Diagnosis: Vaginal bleeding [N93.9]  Diagnosis Additional Information: No value filed.    Anesthesia Type:   MAC     Note:    Oropharynx: oropharynx clear of all foreign objects  Level of Consciousness: drowsy  Oxygen Supplementation: face mask  Level of Supplemental Oxygen (L/min / FiO2): 10  Independent Airway: airway patency satisfactory and stable  Dentition: dentition unchanged  Vital Signs Stable: post-procedure vital signs reviewed and stable  Report to RN Given: handoff report given  Patient transferred to: Phase II    Handoff Report: Identifed the Patient, Identified the Reponsible Provider, Reviewed the pertinent medical history, Discussed the surgical course, Reviewed Intra-OP anesthesia mangement and issues during anesthesia, Set expectations for post-procedure period and Allowed opportunity for questions and acknowledgement of understanding      Vitals:  Vitals Value Taken Time   BP 96/51 03/01/22 2142   Temp 36.4  C (97.6  F) 03/01/22 2140   Pulse 76 03/01/22 2142   Resp 18 03/01/22 2142   SpO2 100 % 03/01/22 2142   Vitals shown include unvalidated device data.    Electronically Signed By: LISA Velásquez CRNA  March 1, 2022  9:44 PM

## 2022-03-03 LAB
PATH REPORT.COMMENTS IMP SPEC: NORMAL
PATH REPORT.FINAL DX SPEC: NORMAL
PATH REPORT.GROSS SPEC: NORMAL
PATH REPORT.MICROSCOPIC SPEC OTHER STN: NORMAL
PATH REPORT.RELEVANT HX SPEC: NORMAL
PHOTO IMAGE: NORMAL

## 2022-03-03 PROCEDURE — 88305 TISSUE EXAM BY PATHOLOGIST: CPT | Mod: 26 | Performed by: PATHOLOGY

## 2022-03-07 ENCOUNTER — TELEPHONE (OUTPATIENT)
Dept: MIDWIFE SERVICES | Facility: CLINIC | Age: 22
End: 2022-03-07
Payer: COMMERCIAL

## 2022-03-07 ENCOUNTER — NURSE TRIAGE (OUTPATIENT)
Dept: NURSING | Facility: CLINIC | Age: 22
End: 2022-03-07
Payer: COMMERCIAL

## 2022-03-07 NOTE — TELEPHONE ENCOUNTER
Triage Call: Few migraines in the last few days. She wants to know if she can take her Imitrex medication while breast feeding. Imitrex 100mg tablets - One tablet by mouth for onset may repeat after 2 hours.     Paging on call Midwife Cong Alexis via smart web at 5:48pm. Midwife called back and stated she will call the patient.     Elvia Naylor, RN Nursing Advisor 3/7/2022 5:59 PM     Reason for Disposition    [1] Caller has URGENT medication question about med that PCP or specialist prescribed AND [2] triager unable to answer question    Additional Information    Negative: Drug overdose and triager unable to answer question    Negative: Caller requesting information unrelated to medicine    Negative: Caller requesting a prescription for Strep throat and has a positive culture result    Negative: Rash while taking a medication or within 3 days of stopping it    Negative: Immunization reaction suspected    Negative: [1] Asthma and [2] having symptoms of asthma (cough, wheezing, etc.)    Negative: [1] Influenza symptoms AND [2] anti-viral med prescription request, such as Tamiflu    Negative: [1] Symptom of illness (e.g., headache, abdominal pain, earache, vomiting) AND [2] more than mild    Negative: MORE THAN A DOUBLE DOSE of a prescription or over-the-counter (OTC) drug    Negative: [1] DOUBLE DOSE (an extra dose or lesser amount) of over-the-counter (OTC) drug AND [2] any symptoms (e.g., dizziness, nausea, pain, sleepiness)    Negative: [1] DOUBLE DOSE (an extra dose or lesser amount) of prescription drug AND [2] any symptoms (e.g., dizziness, nausea, pain, sleepiness)    Negative: Took another person's prescription drug    Negative: [1] DOUBLE DOSE (an extra dose or lesser amount) of prescription drug AND [2] NO symptoms (Exception: a double dose of antibiotics)    Negative: Diabetes drug error or overdose (e.g., took wrong type of insulin or took extra dose)    Negative: [1] Request for URGENT new  "prescription or refill of \"essential\" medication (i.e., likelihood of harm to patient if not taken) AND [2] triager unable to fill per unit policy    Negative: [1] Prescription not at pharmacy AND [2] was prescribed by PCP recently    Negative: [1] Pharmacy calling with prescription questions AND [2] triager unable to answer question    Protocols used: MEDICATION QUESTION CALL-A-AH      "

## 2022-03-07 NOTE — TELEPHONE ENCOUNTER
TC thelma Mcghee. She has had a migraine today with aura. She is wondering what is safe to take while breastfeeding. She has tried tylenol and a small amount of caffeine. She plans to take ibuprofen, but was wondering if imitrex or excedrin migraine would be ok to take.    After checking several resources, discussed that imitrex is probably best option, due to very small risk of Reyes Syndrome with ASA. Discussed that oral availability in term infants is very low, and nearly completely gone after 8 hours.     She recently took the ibuprofen. Will take imitrex if needed.    Cong Alexis CNM

## 2022-03-09 LAB
ATRIAL RATE - MUSE: 64 BPM
DIASTOLIC BLOOD PRESSURE - MUSE: 73 MMHG
INTERPRETATION ECG - MUSE: NORMAL
P AXIS - MUSE: 40 DEGREES
PR INTERVAL - MUSE: 130 MS
QRS DURATION - MUSE: 82 MS
QT - MUSE: 400 MS
QTC - MUSE: 412 MS
R AXIS - MUSE: 69 DEGREES
SYSTOLIC BLOOD PRESSURE - MUSE: 100 MMHG
T AXIS - MUSE: 51 DEGREES
VENTRICULAR RATE- MUSE: 64 BPM

## 2022-03-14 ENCOUNTER — MYC MEDICAL ADVICE (OUTPATIENT)
Dept: FAMILY MEDICINE | Facility: CLINIC | Age: 22
End: 2022-03-14
Payer: COMMERCIAL

## 2022-03-14 DIAGNOSIS — E06.3 HASHIMOTO'S THYROIDITIS: Primary | ICD-10-CM

## 2022-03-14 NOTE — TELEPHONE ENCOUNTER
Has a visit with you 5/5/2022 for a physical but is wanting thyroid labs done or to get in with you sooner. She would like to get the labs done this week. Can you order these labs for her?    Thank you

## 2022-03-16 NOTE — TELEPHONE ENCOUNTER
Labs ordered. She can come in and get these drawn at her convenience.    Paige Mills MD  [facility]  [phone]

## 2022-03-16 NOTE — TELEPHONE ENCOUNTER
Writer responded via Websense.    KIM RosarioN, RN  [facility]

## 2022-03-18 ENCOUNTER — PRENATAL OFFICE VISIT (OUTPATIENT)
Dept: MIDWIFE SERVICES | Facility: CLINIC | Age: 22
End: 2022-03-18
Payer: COMMERCIAL

## 2022-03-18 VITALS
WEIGHT: 145.9 LBS | TEMPERATURE: 98.7 F | BODY MASS INDEX: 22.18 KG/M2 | DIASTOLIC BLOOD PRESSURE: 62 MMHG | SYSTOLIC BLOOD PRESSURE: 90 MMHG | OXYGEN SATURATION: 100 % | HEART RATE: 100 BPM

## 2022-03-18 DIAGNOSIS — E06.3 HASHIMOTO'S THYROIDITIS: ICD-10-CM

## 2022-03-18 DIAGNOSIS — N89.8 VAGINAL ODOR: ICD-10-CM

## 2022-03-18 LAB
CLUE CELLS: ABNORMAL
DEPRECATED CALCIDIOL+CALCIFEROL SERPL-MC: 49 UG/L (ref 20–75)
HGB BLD-MCNC: 10.8 G/DL (ref 11.7–15.7)
T3FREE SERPL-MCNC: 3.9 PG/ML (ref 2.3–4.2)
THYROPEROXIDASE AB SERPL-ACNC: 23 IU/ML
TRICHOMONAS, WET PREP: ABNORMAL
WBC'S/HIGH POWER FIELD, WET PREP: ABNORMAL
YEAST, WET PREP: ABNORMAL

## 2022-03-18 PROCEDURE — 84439 ASSAY OF FREE THYROXINE: CPT | Performed by: ADVANCED PRACTICE MIDWIFE

## 2022-03-18 PROCEDURE — 86376 MICROSOMAL ANTIBODY EACH: CPT | Performed by: ADVANCED PRACTICE MIDWIFE

## 2022-03-18 PROCEDURE — 87210 SMEAR WET MOUNT SALINE/INK: CPT | Performed by: ADVANCED PRACTICE MIDWIFE

## 2022-03-18 PROCEDURE — 36415 COLL VENOUS BLD VENIPUNCTURE: CPT | Performed by: ADVANCED PRACTICE MIDWIFE

## 2022-03-18 PROCEDURE — 84443 ASSAY THYROID STIM HORMONE: CPT | Performed by: ADVANCED PRACTICE MIDWIFE

## 2022-03-18 PROCEDURE — 99207 PR POST PARTUM EXAM: CPT | Performed by: ADVANCED PRACTICE MIDWIFE

## 2022-03-18 PROCEDURE — 82306 VITAMIN D 25 HYDROXY: CPT | Performed by: ADVANCED PRACTICE MIDWIFE

## 2022-03-18 PROCEDURE — 84481 FREE ASSAY (FT-3): CPT | Performed by: ADVANCED PRACTICE MIDWIFE

## 2022-03-18 NOTE — PROGRESS NOTES
Litzy is here for a 6-week postpartum checkup.    She had a  of a viable girl, weight 7 pounds 11.8 oz., with complication of retained products with D&C at 3 weeks postpartum. Date of delivery was 2/3/22. Since delivery, she has been breast feeding.  She has no signs of infection, bleeding or other complications.  She is not pregnant.  We discussed contraceptions and she has chosen natural family planning [place] Method. She is working with an instructor and also her pediatrician who is certified in this method. Discussed unpredictable ovulation postpartum and she understands and feels well informed and resourced.      She is feeling well overall but noticing some vaginal odor. Denies itching, burning, pain, or changes in discharge.    Post partum tubal: Yes  Type of Delivery:  Vaginal  Feeding Method:  Breast      REVIEW OF SYSTEMS:  Ears/Nose/Throat: negative  Respiratory: negative  Cardiovascular: negative  Gastrointestinal: negative  Genitourinary: negative  Musculoskeletal: negative    Neurologic: negative   Skin: negative   Endocrine:  negative  Vagina: negative  Cervix: negative  Breasts: negative  Vulva: negative  Episiotomy: negative  Contraception Plan: natural family planning      EXAM:    HEENT: grossly normal.  NECK: no lymphadenopathy or thyroidomegaly.  LUNGS: CTA X 2, no rales or crackles.  BACK: No spinal or CVA tenderness.  HEART: RRR without murmurs clicks or gallops.  ABDOMEN: soft, non tender, good bowel sounds, without masses   rebound, guarding or tenderness.  PELVIC:  External genitalia; normal without lesion, repair well healed.         Vagina: normal mucosa and rugae, no discharge. Wet prep collected  EXTREMITIES:  warm to touch, good pulses, no ankle edema or calf tenderness.  NEUROLOGIC: grossly normal.    ASSESSMENT:   6-week postpartum exam after .    PLAN:   (Z39.2) Routine postpartum follow-up  (primary encounter diagnosis)  Plan: Vitamin D Deficiency    (E06.3)  Hashimoto's thyroiditis  Comment: TSH/T4 labs today ordered by PCP    (N89.8) Vaginal odor  Plan: Wet prep - Clinic Collect      LISA Ma CNM

## 2022-03-19 LAB
T4 FREE SERPL-MCNC: 1.23 NG/DL (ref 0.76–1.46)
TSH SERPL DL<=0.005 MIU/L-ACNC: 0.2 MU/L (ref 0.4–4)

## 2022-03-22 DIAGNOSIS — R79.89 LOW TSH LEVEL: Primary | ICD-10-CM

## 2022-04-04 ENCOUNTER — MEDICAL CORRESPONDENCE (OUTPATIENT)
Dept: HEALTH INFORMATION MANAGEMENT | Facility: CLINIC | Age: 22
End: 2022-04-04
Payer: COMMERCIAL

## 2022-04-12 ENCOUNTER — VIRTUAL VISIT (OUTPATIENT)
Dept: BEHAVIORAL HEALTH | Facility: CLINIC | Age: 22
End: 2022-04-12
Payer: COMMERCIAL

## 2022-04-12 DIAGNOSIS — F43.23 ADJUSTMENT DISORDER WITH MIXED ANXIETY AND DEPRESSED MOOD: Primary | ICD-10-CM

## 2022-04-12 PROCEDURE — 90832 PSYTX W PT 30 MINUTES: CPT | Mod: 95 | Performed by: SOCIAL WORKER

## 2022-04-12 ASSESSMENT — ANXIETY QUESTIONNAIRES
3. WORRYING TOO MUCH ABOUT DIFFERENT THINGS: SEVERAL DAYS
GAD7 TOTAL SCORE: 5
7. FEELING AFRAID AS IF SOMETHING AWFUL MIGHT HAPPEN: SEVERAL DAYS
1. FEELING NERVOUS, ANXIOUS, OR ON EDGE: SEVERAL DAYS
6. BECOMING EASILY ANNOYED OR IRRITABLE: SEVERAL DAYS
5. BEING SO RESTLESS THAT IT IS HARD TO SIT STILL: NOT AT ALL
GAD7 TOTAL SCORE: 5
4. TROUBLE RELAXING: SEVERAL DAYS
GAD7 TOTAL SCORE: 5
2. NOT BEING ABLE TO STOP OR CONTROL WORRYING: NOT AT ALL
7. FEELING AFRAID AS IF SOMETHING AWFUL MIGHT HAPPEN: SEVERAL DAYS

## 2022-04-12 ASSESSMENT — PATIENT HEALTH QUESTIONNAIRE - PHQ9
SUM OF ALL RESPONSES TO PHQ QUESTIONS 1-9: 0
SUM OF ALL RESPONSES TO PHQ QUESTIONS 1-9: 0
10. IF YOU CHECKED OFF ANY PROBLEMS, HOW DIFFICULT HAVE THESE PROBLEMS MADE IT FOR YOU TO DO YOUR WORK, TAKE CARE OF THINGS AT HOME, OR GET ALONG WITH OTHER PEOPLE: NOT DIFFICULT AT ALL

## 2022-04-12 NOTE — PROGRESS NOTES
[facility]- Integrated Behavioral Health Services  April 17, 2022    Behavioral Health Clinician Progress Note    Patient Name: Litzy Hatch      Telemedicine Visit: The patient's condition can be safely assessed and treated via synchronous audio and visual telemedicine encounter.      Reason for Telemedicine Visit: Patient has requested telehealth visit    Originating Site (Patient Location): Patient's home    Distant Site (Provider Location): [facility]    Consent:  The patient/guardian has verbally consented to: the potential risks and benefits of telemedicine (video visit) versus in person care; bill my insurance or make self-payment for services provided; and responsibility for payment of non-covered services.     Mode of Communication:  Video Conference via [employer]    As the provider I attest to compliance with applicable laws and regulations related to telemedicine.         Service Type:  Individual     Session Start Time: 9: 34 am  Session End Time: 10:11 am       Session Length: 16 - 37      Attendees: Patient    Visit Activities (Refresh list every visit): [facility] Only    Diagnostic Assessment Date: 1/21/22  Treatment Plan Review Date: 4/12/22, next due 7/12/22  See Flowsheets for today's PHQ-9 and CHRISTINA-7 results  Previous PHQ-9:   PHQ-9 SCORE 4/12/2022   PHQ-9 Total Score MyChart 0   PHQ-9 Total Score 0     Previous CHRISTINA-7:   CHRISTINA-7 SCORE 4/12/2022   Total Score 5 (mild anxiety)   Total Score 5       JONNATHAN LEVEL:  No flowsheet data found.    DATA  Extended Session (60+ minutes): No  Interactive Complexity: No  Crisis: No  [facility] Patient: No    Treatment Objective(s) Addressed in This Session:  Target Behavior(s): disease management/lifestyle changes , mental health management    Anxiety: will experience a reduction in anxiety and will develop more effective coping skills to manage anxiety symptoms  Adjustment  Difficulties: will develop coping/problem-solving skills to facilitate more adaptive adjustment    Current Stressors / Issues:  Patient last seen 2 weeks postpartum and had few concerns with her mood and mental health. Today, patient discussed impact that retained products of conception have had on her mental health. She reflected upon her experiences when she learned that she was bleeding, when size of clots increased, and when she realized the need to be transported to the ED for further evaluation.  Patient spent many hours in the ED with a care team that she did not feel were supportive and then required an emergency D &C. Patient stated that she transitioned home and noticed changes in her ability to trust her body. Patient stated that she can worry if she is producing enough milk and can feel like she is second guessing her body. Patient shared that she got her period for the first time last week and noticed that she had a reaction to seeing blood again which included anxiety about what was happening and caused her to re-live some of her experiences in the ED.  Validated and normalized patient's experiences.  Provided psycho-education on Accelerated Resolution Therapy, with patient expressing interest in scheduling a session. She shared that she is worried about her transition back to work after this experience and feels sadness about how this experience has impacted her feelings when she reflects upon her daughter's birth.    Progress on Treatment Objective(s) / Homework:  Worsening - PREPARATION (Decided to change - considering how); Intervened by negotiating a change plan and determining options / strategies for behavior change, identifying triggers, exploring social supports, and working towards setting a date to begin behavior change    Motivational Interviewing    MI Intervention: Expressed Empathy/Understanding, Open-ended questions, Reflections: simple and complex, Change talk (evoked) and Reframe      Change Talk Expressed by the Patient: Desire to change Ability to change Reasons to change    Provider Response to Change Talk: E - Evoked more info from patient about behavior change, A - Affirmed patient's thoughts, decisions, or attempts at behavior change, R - Reflected patient's change talk and S - Summarized patient's change talk statements    Cognitive Behavioral Therapy: Discussed connection between thoughts, feelings, and behaviors. Taught patient how to identify cognitive distortions, and assisted patient to identify own cognitive distortions. Taught and engaged patient in cognitive restructuring techniques.    Also provided psychoeducation about behavioral health condition, symptoms, and treatment options    Care Plan review completed: No    Medication Review:  No current psychiatric medications prescribed    Medication Compliance:  NA    Changes in Health Issues:   None reported    Chemical Use Review:   Substance Use: Chemical use reviewed, no active concerns identified      Tobacco Use: No current tobacco use.      Assessment: Current Emotional / Mental Status (status of significant symptoms):  Risk status (Self / Other harm or suicidal ideation)  Patient denies a history of suicidal ideation, suicide attempts, self-injurious behavior, homicidal ideation, homicidal behavior and and other safety concerns  Patient denies current fears or concerns for personal safety.  Patient denies current or recent suicidal ideation or behaviors.  Patient denies current or recent homicidal ideation or behaviors.  Patient denies current or recent self injurious behavior or ideation.  Patient denies other safety concerns.  A safety and risk management plan has not been developed at this time, however patient was encouraged to call [place] [phone] should there be a change in any of these risk factors.    Appearance:   Appropriate   Eye Contact:   Good   Psychomotor Behavior: Normal   Attitude:   Cooperative    Orientation:   All  Speech   Rate / Production: Normal    Volume:  Normal   Mood:    Normal  Affect:    Appropriate   Thought Content:  Clear   Thought Form:  Coherent  Logical   Insight:    Good     Diagnoses:  1. Adjustment disorder with mixed anxiety and depressed mood        Collateral Reports Completed:  Not Applicable    Plan: (Homework, other):  Patient was given information about behavioral services and encouraged to schedule a follow up appointment with the clinic [facility] in 2 weeks for ART  session .  CD Recommendations: No indications of CD issues.  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    Anticipated number of session for this episode of care: 3-4  Anticipation frequency of session: Every other week/every 3 weeks  Anticipated Duration of each session: 38-52 minutes  Treatment plan will be reviewed in 90 days or when goals have been changed.       MeasurableTreatment Goal(s) related to diagnosis / functional impairment(s)  Goal 1: Patient will reduce symptoms of anxiety as evidenced by decreased score on CHRISTINA 7    I will know I've met my goal when I feel like I can trust my body again.      Objective #A (Patient Action)    Patient will use cognitive strategies  identified in therapy to challenge anxious thoughts.  Status: New - Date: 4/12/22     Intervention(s)  Therapist will teach cognitive restructuring and defusion techniques.    Objective #B  Patient will identify three distraction and diversion activities and use those activities to decrease level of anxiety  .  Status: New - Date: 4/12/22     Intervention(s)  Therapist will teach distress tolerance, mindfulness, and relaxation techniques.    Objective #C  Patient will reduce MARTIN when thinking about her postpartum hemorrhage and D&C.  Status: New - Date: 4/12/22     Intervention(s)  Therapist will provide Accelerated Resolution Therapy session.    Patient has reviewed and agreed to the above plan.      Kasey Mohr, [facility]  April 12, 2022

## 2022-04-13 ASSESSMENT — PATIENT HEALTH QUESTIONNAIRE - PHQ9: SUM OF ALL RESPONSES TO PHQ QUESTIONS 1-9: 0

## 2022-04-13 ASSESSMENT — ANXIETY QUESTIONNAIRES: GAD7 TOTAL SCORE: 5

## 2022-04-20 ENCOUNTER — NURSE TRIAGE (OUTPATIENT)
Dept: MIDWIFE SERVICES | Facility: CLINIC | Age: 22
End: 2022-04-20
Payer: COMMERCIAL

## 2022-04-20 ENCOUNTER — APPOINTMENT (OUTPATIENT)
Dept: URBAN - METROPOLITAN AREA CLINIC 260 | Age: 22
Setting detail: DERMATOLOGY
End: 2022-04-21

## 2022-04-20 VITALS — HEIGHT: 68 IN | RESPIRATION RATE: 16 BRPM | WEIGHT: 145 LBS

## 2022-04-20 DIAGNOSIS — L70.0 ACNE VULGARIS: ICD-10-CM

## 2022-04-20 PROCEDURE — OTHER PRESCRIPTION MEDICATION MANAGEMENT: OTHER

## 2022-04-20 PROCEDURE — 99214 OFFICE O/P EST MOD 30 MIN: CPT

## 2022-04-20 PROCEDURE — OTHER COUNSELING: OTHER

## 2022-04-20 PROCEDURE — OTHER PRESCRIPTION: OTHER

## 2022-04-20 PROCEDURE — OTHER MIPS QUALITY: OTHER

## 2022-04-20 RX ORDER — AZELAIC ACID 0.15 G/G
15% GEL TOPICAL BID
Qty: 50 | Refills: 3 | Status: ERX | COMMUNITY
Start: 2022-04-20

## 2022-04-20 RX ORDER — TRETIONIN 0.5 MG/G
0.05% CREAM TOPICAL QHS
Qty: 45 | Refills: 2 | Status: ERX | COMMUNITY
Start: 2022-04-20

## 2022-04-20 ASSESSMENT — LOCATION SIMPLE DESCRIPTION DERM
LOCATION SIMPLE: LEFT CHEEK
LOCATION SIMPLE: RIGHT CHEEK

## 2022-04-20 ASSESSMENT — LOCATION ZONE DERM: LOCATION ZONE: FACE

## 2022-04-20 ASSESSMENT — LOCATION DETAILED DESCRIPTION DERM
LOCATION DETAILED: RIGHT INFERIOR CENTRAL MALAR CHEEK
LOCATION DETAILED: LEFT INFERIOR CENTRAL MALAR CHEEK

## 2022-04-20 ASSESSMENT — SEVERITY ASSESSMENT OVERALL AMONG ALL PATIENTS
IN YOUR EXPERIENCE, AMONG ALL PATIENTS YOU HAVE SEEN WITH THIS CONDITION, HOW SEVERE IS THIS PATIENT'S CONDITION?: FEW INFLAMMATORY LESIONS, SOME NONINFLAMMATORY

## 2022-04-20 NOTE — PROCEDURE: PRESCRIPTION MEDICATION MANAGEMENT
Detail Level: Simple
Plan: Follow up in 2 months if acne not improved, 6 months if everything working well.
Render In Strict Bullet Format?: No
Initiate Treatment: Restart Azelaic Acid 15% gel BID, Tretinoin 0.05% cream QHS

## 2022-04-20 NOTE — TELEPHONE ENCOUNTER
Litzy can just continue monitoring at home. This sounds like normal routine postpartum bleeding and perhaps her period returning. She should call back if bleeding is filling a large pad in an hour or if she has any clots. My only concern is that Litzy has been using natural family planning to avoid pregnancy. If her bleeding is so irregular it can be quite difficult to use this method to prevent pregnancy. I encourage her to use condoms or reconsider other birth control options. If she wants to discuss these more, we can schedule phone or clinic appointment.    LISA Ma CNM

## 2022-04-20 NOTE — TELEPHONE ENCOUNTER
Will route to the provider to review and advise.   Patient is reporting that she had d&C and hemorrhage 3weeks after baby. Week and a half ago she thought she got her period. 2 days of light flow. Then started bleeding again in the last couple of days, again a light flow.    Any additional recommendations.   No [facility] RN    Reason for Disposition    Normal postpartum bleeding    Additional Information    Negative: Shock suspected (e.g., cold/pale/clammy skin, too weak to stand, low BP, rapid pulse)    Negative: Difficult to awaken or acting confused (e.g., disoriented, slurred speech)    Negative: Passed out (i.e., fainted, collapsed and was not responding)    Negative: Sounds like a life-threatening emergency to the triager    Negative: SEVERE dizziness (e.g., unable to stand, requires support to walk, feels like passing out now)    Negative: SEVERE abdominal pain and present > 1 hour    Negative: Fever > 100.4 F (38.0 C)    Negative: SEVERE vaginal bleeding (e.g., soaking 2 pads or tampons per hour) and present 2 or more hours    Negative: Patient sounds very sick or weak to the triager    Negative: MODERATE vaginal bleeding (e.g., soaking 1 pad per hour AND present > 6 hours)    Negative: Constant abdominal pain and present > 2 hours    Negative: Bleeding with > 6 soaked pads per day    Negative: Passing blood clots and persists > 4 days postpartum    Negative: Pale skin (pallor) of new onset or worsening    Negative: Foul smelling vaginal discharge (i.e., lochia)    Negative: Taking Coumadin (warfarin) or other strong blood thinner, or known bleeding disorder (e.g., thrombocytopenia)    Negative: Skin bruises or nosebleed and not caused by an injury    Negative: Patient wants to be seen    Negative: Vaginal bleeding or spotting lasts > 4 weeks AND red or red-brown    Negative: Vaginal bleeding or spotting lasts > 5 weeks AND pale-brown or pink    Answer Assessment - Initial Assessment Questions  1. ONSET:  "\"Describe your bleeding: is it getting worse, staying the same, improving, or stopping and starting?\"      Stopping and starting  2. AMOUNT: \"How much bleeding are you having today?\"       - Slight: spotting, or pinkish / brownish mucous discharge; used less than one pad total     - MILD - less than one pad per hour; similar to menstrual bleeding     3. ABDOMINAL PAIN: \"Do you have any pain?\" \"How bad is the pain?\" \"What does it keep you from doing?\"      None    4. HORMONES: \"Are you taking any birth control medications?\" (e.g., birth control pills, Depo-Provera)      None  5. BLOOD THINNERS: \"Do you take any blood thinners?\" (e.g., coumadin, aspirin)      None  6. OTHER SYMPTOMS: \"Do you have any other symptoms?\" (e.g., fever, chills, dizziness)      None, maybe sinus infection  7. DELIVERY DATE: \"When was your delivery date?\" \"Vaginal delivery or ?\"      Vaginal delivery  8. BREASTFEEDING: \"Are you breastfeeding?\"      Breastfeeding    Protocols used: POSTPARTUM - VAGINAL BLEEDING AND LOCHIA-A-OH      "

## 2022-05-03 ENCOUNTER — OFFICE VISIT (OUTPATIENT)
Dept: BEHAVIORAL HEALTH | Facility: CLINIC | Age: 22
End: 2022-05-03
Payer: COMMERCIAL

## 2022-05-03 DIAGNOSIS — F43.23 ADJUSTMENT DISORDER WITH MIXED ANXIETY AND DEPRESSED MOOD: Primary | ICD-10-CM

## 2022-05-03 PROCEDURE — 90832 PSYTX W PT 30 MINUTES: CPT | Performed by: SOCIAL WORKER

## 2022-05-03 NOTE — PROGRESS NOTES
[facility]- Integrated Behavioral Health Services  May 3, 2022    Behavioral Health Clinician Progress Note    Patient Name: Litzy Hatch      Telemedicine Visit: The patient's condition can be safely assessed and treated via synchronous audio and visual telemedicine encounter.      Reason for Telemedicine Visit: Patient has requested telehealth visit    Originating Site (Patient Location): Patient's home    Distant Site (Provider Location): [facility]: [facility]    Consent:  The patient/guardian has verbally consented to: the potential risks and benefits of telemedicine (video visit) versus in person care; bill my insurance or make self-payment for services provided; and responsibility for payment of non-covered services.     Mode of Communication:  Video Conference via [employer]    As the provider I attest to compliance with applicable laws and regulations related to telemedicine.         Service Type:  Individual     Session Start Time: 9: 35 am  Session End Time: 10: 27 am       Session Length: 16 - 37      Attendees: Patient    Visit Activities (Refresh list every visit): [facility] Only    Diagnostic Assessment Date: 1/21/22  Treatment Plan Review Date: 4/12/22, next due 7/12/22  See Flowsheets for today's PHQ-9 and CHRISTINA-7 results  Previous PHQ-9:   PHQ-9 SCORE 4/12/2022   PHQ-9 Total Score MyChart 0   PHQ-9 Total Score 0     Previous CHRISTINA-7:   CHRISTINA-7 SCORE 4/12/2022   Total Score 5 (mild anxiety)   Total Score 5       JONNATHAN LEVEL:  No flowsheet data found.    DATA  Extended Session (60+ minutes): No  Interactive Complexity: No  Crisis: No  [facility] Patient: No    Treatment Objective(s) Addressed in This Session:  Target Behavior(s): disease management/lifestyle changes , mental health management    Anxiety: will experience a reduction in anxiety and will develop more effective coping skills to manage anxiety symptoms  Adjustment  "Difficulties: will develop coping/problem-solving skills to facilitate more adaptive adjustment    Current Stressors / Issues:  Patient reported improvement in symptoms and trauma responses when seeing blood or triggers from her postpartum hemorrhage. Patient would like to move forward with an ART session to help her process through the experience. Patient identified her MARTIN at start of visit as a 6/10. Patient presented with high interest and motivation to process through the sensations and experiences. Patient shared that she was surprised by specific images that popped up, including an image of feeling need to \"power through it\" when she experienced hyperemesis during pregnancy. Patient identified MARTIN at end of visit as a 3/10 and shared that she was grateful for the ability to reflect upon her experiences since she was able to see why it felt difficult and traumatic for her. Discussed opportunity to follow up on theme identified of patient's feelings of need to \"power through\" experiences/symptoms.  Scheduled follow up visit in 2 weeks.    Progress on Treatment Objective(s) / Homework:  Satisfactory progress - ACTION (Actively working towards change); Intervened by reinforcing change plan / affirming steps taken    Motivational Interviewing    MI Intervention: Expressed Empathy/Understanding, Open-ended questions, Reflections: simple and complex, Change talk (evoked) and Reframe     Change Talk Expressed by the Patient: Desire to change Ability to change Reasons to change    Provider Response to Change Talk: E - Evoked more info from patient about behavior change, A - Affirmed patient's thoughts, decisions, or attempts at behavior change, R - Reflected patient's change talk and S - Summarized patient's change talk statements    Accelerated Resolution Therapy: Re-exposure to traumatic events and image replacement during bilateral eye movement.    Also provided psychoeducation about behavioral health condition, " symptoms, and treatment options    Care Plan review completed: No    Medication Review:  No current psychiatric medications prescribed    Medication Compliance:  NA    Changes in Health Issues:   None reported    Chemical Use Review:   Substance Use: Chemical use reviewed, no active concerns identified      Tobacco Use: No current tobacco use.      Assessment: Current Emotional / Mental Status (status of significant symptoms):  Risk status (Self / Other harm or suicidal ideation)  Patient denies a history of suicidal ideation, suicide attempts, self-injurious behavior, homicidal ideation, homicidal behavior and and other safety concerns  Patient denies current fears or concerns for personal safety.  Patient denies current or recent suicidal ideation or behaviors.  Patient denies current or recent homicidal ideation or behaviors.  Patient denies current or recent self injurious behavior or ideation.  Patient denies other safety concerns.  A safety and risk management plan has not been developed at this time, however patient was encouraged to call [place] [phone] should there be a change in any of these risk factors.    Appearance:   Appropriate   Eye Contact:   Good   Psychomotor Behavior: Normal   Attitude:   Cooperative   Orientation:   All  Speech   Rate / Production: Normal    Volume:  Normal   Mood:    Normal  Affect:    Appropriate   Thought Content:  Clear   Thought Form:  Coherent  Logical   Insight:    Good     Diagnoses:  1. Adjustment disorder with mixed anxiety and depressed mood        Collateral Reports Completed:  Not Applicable    Plan: (Homework, other):  Patient was given information about behavioral services and encouraged to schedule a follow up appointment with the clinic [facility] in 2 weeks. Patient participated in an ART session. CD Recommendations: No indications of CD issues.  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    Anticipated number of session for this episode of care: 3-4  Anticipation frequency of session: Every other week/every 3 weeks  Anticipated Duration of each session: 38-52 minutes  Treatment plan will be reviewed in 90 days or when goals have been changed.       MeasurableTreatment Goal(s) related to diagnosis / functional impairment(s)  Goal 1: Patient will reduce symptoms of anxiety as evidenced by decreased score on CHRISTINA 7    I will know I've met my goal when I feel like I can trust my body again.      Objective #A (Patient Action)    Patient will use cognitive strategies identified in therapy to challenge anxious thoughts.  Status: New - Date: 4/12/22     Intervention(s)  Therapist will teach cognitive restructuring and defusion techniques.    Objective #B  Patient will identify three distraction and diversion activities and use those activities to decrease level of anxiety  .  Status: New - Date: 4/12/22     Intervention(s)  Therapist will teach distress tolerance, mindfulness, and relaxation techniques.    Objective #C  Patient will reduce MARTIN when thinking about her postpartum hemorrhage and D&C.  Status: New - Date: 4/12/22     Intervention(s)  Therapist will provide Accelerated Resolution Therapy session.    Patient has reviewed and agreed to  the above plan.      Kasey Mohr, [facility]  April 12, 2022

## 2022-05-04 ASSESSMENT — ENCOUNTER SYMPTOMS
DYSURIA: 0
NERVOUS/ANXIOUS: 1
CHILLS: 0
FREQUENCY: 0
JOINT SWELLING: 0
MYALGIAS: 0
BREAST MASS: 0
EYE PAIN: 0
ABDOMINAL PAIN: 1
ARTHRALGIAS: 0
COUGH: 0
PALPITATIONS: 0
SHORTNESS OF BREATH: 0
WEAKNESS: 0
DIZZINESS: 0
NAUSEA: 1
HEMATURIA: 0
HEMATOCHEZIA: 0
DIARRHEA: 0
HEARTBURN: 0
FEVER: 0
SORE THROAT: 0
PARESTHESIAS: 0
HEADACHES: 0
CONSTIPATION: 1

## 2022-05-05 ENCOUNTER — OFFICE VISIT (OUTPATIENT)
Dept: FAMILY MEDICINE | Facility: CLINIC | Age: 22
End: 2022-05-05
Payer: COMMERCIAL

## 2022-05-05 VITALS
DIASTOLIC BLOOD PRESSURE: 60 MMHG | BODY MASS INDEX: 22.28 KG/M2 | WEIGHT: 147 LBS | HEART RATE: 76 BPM | HEIGHT: 68 IN | OXYGEN SATURATION: 98 % | SYSTOLIC BLOOD PRESSURE: 90 MMHG | RESPIRATION RATE: 20 BRPM | TEMPERATURE: 97.9 F

## 2022-05-05 DIAGNOSIS — D50.9 IRON DEFICIENCY ANEMIA, UNSPECIFIED IRON DEFICIENCY ANEMIA TYPE: ICD-10-CM

## 2022-05-05 DIAGNOSIS — Z00.00 ROUTINE GENERAL MEDICAL EXAMINATION AT A HEALTH CARE FACILITY: Primary | ICD-10-CM

## 2022-05-05 DIAGNOSIS — R14.0 ABDOMINAL BLOATING: ICD-10-CM

## 2022-05-05 DIAGNOSIS — Z86.16 HISTORY OF COVID-19: ICD-10-CM

## 2022-05-05 DIAGNOSIS — Z13.220 SCREENING FOR LIPID DISORDERS: ICD-10-CM

## 2022-05-05 PROCEDURE — 99395 PREV VISIT EST AGE 18-39: CPT | Performed by: STUDENT IN AN ORGANIZED HEALTH CARE EDUCATION/TRAINING PROGRAM

## 2022-05-05 ASSESSMENT — ENCOUNTER SYMPTOMS
HEMATURIA: 0
MYALGIAS: 0
PALPITATIONS: 0
FEVER: 0
BREAST MASS: 0
HEADACHES: 0
PARESTHESIAS: 0
FREQUENCY: 0
CONSTIPATION: 1
SHORTNESS OF BREATH: 0
CHILLS: 0
ARTHRALGIAS: 0
DIZZINESS: 0
DYSURIA: 0
DIARRHEA: 0
HEMATOCHEZIA: 0
SORE THROAT: 0
NAUSEA: 1
JOINT SWELLING: 0
NERVOUS/ANXIOUS: 1
COUGH: 0
ABDOMINAL PAIN: 1
HEARTBURN: 0
EYE PAIN: 0
WEAKNESS: 0

## 2022-05-05 NOTE — PROGRESS NOTES
SUBJECTIVE:   CC: Litzy Htach is an 22 year old woman who presents for preventive health visit.     Patient has been advised of split billing requirements and indicates understanding: Yes  Healthy Habits:     Getting at least 3 servings of Calcium per day:  Yes    Bi-annual eye exam:  Yes    Dental care twice a year:  Yes    Sleep apnea or symptoms of sleep apnea:  None    Diet:  Gluten-free/reduced    Frequency of exercise:  2-3 days/week    Duration of exercise:  Less than 15 minutes    Taking medications regularly:  Yes    Medication side effects:  None    PHQ-2 Total Score: 0    Additional concerns today:  No    Doing well. Here with her spouse and 3 month old baby girl, Mally.    Only concern is abdominal bloating after meals and constipation. Had SIBO testing in Jan 2021 that was positive. Also completed a GI effects that was unremarkable overall. Symptoms  Had improved with Rifaximin. Managing constipation with magnesium glycinate and senna.     Today's PHQ-2 Score:   PHQ-2 ( 1999 Pfizer) 5/4/2022   Q1: Little interest or pleasure in doing things 0   Q2: Feeling down, depressed or hopeless 0   PHQ-2 Score 0   PHQ-2 Total Score (12-17 Years)- Positive if 3 or more points; Administer PHQ-A if positive -   Q1: Little interest or pleasure in doing things Not at all   Q2: Feeling down, depressed or hopeless Not at all   PHQ-2 Score 0       Abuse: Current or Past (Physical, Sexual or Emotional) - No  Do you feel safe in your environment? Yes    Have you ever done Advance Care Planning? (For example, a Health Directive, POLST, or a discussion with a medical provider or your loved ones about your wishes): No, advance care planning information given to patient to review.  Patient plans to discuss their wishes with loved ones or provider.      Social History     Tobacco Use     Smoking status: Never Smoker     Smokeless tobacco: Never Used   Substance Use Topics     Alcohol use: Not Currently     Comment:  "1-2 X per week     If you drink alcohol do you typically have >3 drinks per day or >7 drinks per week? No    Alcohol Use 5/5/2022   Prescreen: >3 drinks/day or >7 drinks/week? -   Prescreen: >3 drinks/day or >7 drinks/week? No       Reviewed orders with patient.  Reviewed health maintenance and updated orders accordingly - Yes  Lab work is in process    Breast Cancer Screening:        History of abnormal Pap smear: NO - age 21-29 PAP every 3 years recommended  PAP / HPV 11/4/2020   PAP (Historical) NIL     Reviewed and updated as needed this visit by clinical staff   Tobacco  Allergies  Meds   Med Hx  Surg Hx  Fam Hx  Soc Hx          Reviewed and updated as needed this visit by Provider                   Review of Systems   Constitutional: Negative for chills and fever.   HENT: Negative for congestion, ear pain, hearing loss and sore throat.    Eyes: Negative for pain and visual disturbance.   Respiratory: Negative for cough and shortness of breath.    Cardiovascular: Negative for chest pain, palpitations and peripheral edema.   Gastrointestinal: Positive for abdominal pain, constipation and nausea. Negative for diarrhea, heartburn and hematochezia.   Breasts:  Negative for tenderness, breast mass and discharge.   Genitourinary: Negative for dysuria, frequency, genital sores, hematuria, pelvic pain, urgency, vaginal bleeding and vaginal discharge.   Musculoskeletal: Negative for arthralgias, joint swelling and myalgias.   Skin: Negative for rash.   Neurological: Negative for dizziness, weakness, headaches and paresthesias.   Psychiatric/Behavioral: Negative for mood changes. The patient is nervous/anxious.         OBJECTIVE:   BP 90/60 (BP Location: Right arm, Patient Position: Sitting, Cuff Size: Adult Regular)   Pulse 76   Temp 97.9  F (36.6  C) (Temporal)   Resp 20   Ht 1.72 m (5' 7.72\")   Wt 66.7 kg (147 lb)   SpO2 98%   BMI 22.54 kg/m    Physical Exam  GENERAL: healthy, alert and no distress  EYES: " "Eyes grossly normal to inspection, PERRL and conjunctivae and sclerae normal  HENT: ear canals and TM's normal, nose and mouth without ulcers or lesions  NECK: no adenopathy, no asymmetry, masses, or scars and thyroid normal to palpation  RESP: lungs clear to auscultation - no rales, rhonchi or wheezes  BREAST: normal without masses, tenderness or nipple discharge and no palpable axillary masses or adenopathy  CV: regular rate and rhythm, normal S1 S2, no S3 or S4, no murmur, click or rub, no peripheral edema and peripheral pulses strong  ABDOMEN: soft, nontender, no hepatosplenomegaly, no masses and bowel sounds normal  MS: no gross musculoskeletal defects noted, no edema  SKIN: no suspicious lesions or rashes  NEURO: Normal strength and tone, mentation intact and speech normal  PSYCH: mentation appears normal, affect normal/bright    Diagnostic Test Results:  Labs reviewed in Epic    ASSESSMENT/PLAN:   Litzy was seen today for physical.    Diagnoses and all orders for this visit:    Routine general medical examination at a health care facility  -     Glucose; Future    History of COVID-19  Has not been vaccinated yet due to pregnancy and h/o COVID infection one year ago. Requests antibodies as she would like to wait to be vaccinated until titers fall.  -     COVID-19 Corey RBD Rosey & Titer Reflex; Future    Iron deficiency anemia, unspecified iron deficiency anemia type  Anemia due to pregnancy. She is supplementing. Recheck with future thyroid orders.  -     Hemoglobin; Future    Screening for lipid disorders  -     Lipid panel reflex to direct LDL Fasting; Future    Abdominal bloating  She will try a low FODMAP diet and lacy. If persistent symptoms, we will recheck a SIBO breath test.    COUNSELING:  Reviewed preventive health counseling, as reflected in patient instructions    Estimated body mass index is 22.54 kg/m  as calculated from the following:    Height as of this encounter: 1.72 m (5' 7.72\").    " Weight as of this encounter: 66.7 kg (147 lb).      She reports that she has never smoked. She has never used smokeless tobacco.      Counseling Resources:  ATP IV Guidelines  Pooled Cohorts Equation Calculator  Breast Cancer Risk Calculator  BRCA-Related Cancer Risk Assessment: FHS-7 Tool  FRAX Risk Assessment  ICSI Preventive Guidelines  Dietary Guidelines for Americans, 2010  USDA's MyPlate  ASA Prophylaxis  Lung CA Screening    Paige Mills MD  [facility]

## 2022-05-05 NOTE — PATIENT INSTRUCTIONS
Deisy extract. Pure encapsulations has a good deisy supplement. Dose is 1-4 capsules at bedtime.    Preventive Health Recommendations  Female Ages 21 to 25     Yearly exam:   See your health care provider every year in order to  Review health changes.   Discuss preventive care.    Review your medicines if your doctor has prescribed any.    You should be tested each year for STDs (sexually transmitted diseases).     Talk to your provider about how often you should have cholesterol testing.    Get a Pap test every three years. If you have an abnormal result, your doctor may have you test more often.    If you are at risk for diabetes, you should have a diabetes test (fasting glucose).     Shots:   Get a flu shot each year.   Get a tetanus shot every 10 years.   Consider getting the shot (vaccine) that prevents cervical cancer (Gardasil).    Nutrition:   Eat at least 5 servings of fruits and vegetables each day.  Eat whole-grain bread, whole-wheat pasta and brown rice instead of white grains and rice.  Get adequate Calcium and Vitamin D.     Lifestyle  Exercise at least 150 minutes a week each week (30 minutes a day, 5 days a week). This will help you control your weight and prevent disease.  Limit alcohol to one drink per day.  No smoking.   Wear sunscreen to prevent skin cancer.  See your dentist every six months for an exam and cleaning.

## 2022-05-17 ENCOUNTER — VIRTUAL VISIT (OUTPATIENT)
Dept: BEHAVIORAL HEALTH | Facility: CLINIC | Age: 22
End: 2022-05-17
Payer: COMMERCIAL

## 2022-05-17 DIAGNOSIS — F43.23 ADJUSTMENT DISORDER WITH MIXED ANXIETY AND DEPRESSED MOOD: Primary | ICD-10-CM

## 2022-05-17 PROCEDURE — 90834 PSYTX W PT 45 MINUTES: CPT | Mod: 95 | Performed by: SOCIAL WORKER

## 2022-05-17 NOTE — PROGRESS NOTES
[facility] and [facility]- Integrated Behavioral Health Services  May 17, 2022    Behavioral Health Clinician Progress Note    Patient Name: Litzy Hatch      Telemedicine Visit: The patient's condition can be safely assessed and treated via synchronous audio and visual telemedicine encounter.      Reason for Telemedicine Visit: Patient has requested telehealth visit    Originating Site (Patient Location): Patient's home    Distant Site (Provider Location): [facility] Clin[facility]: [facility]    Consent:  The patient/guardian has verbally consented to: the potential risks and benefits of telemedicine (video visit) versus in person care; bill my insurance or make self-payment for services provided; and responsibility for payment of non-covered services.     Mode of Communication:  Video Conference via [employer]    As the provider I attest to compliance with applicable laws and regulations related to telemedicine.         Service Type:  Individual     Session Start Time: 11:31 am  Session End Time: 12: 17 pm       Session Length: 38 - 52      Attendees: Patient    Visit Activities (Refresh list every visit): [facility] Only    Diagnostic Assessment Date: 1/21/22  Treatment Plan Review Date: 4/12/22, next due 7/12/22  See Flowsheets for today's PHQ-9 and CHRISTINA-7 results  Previous PHQ-9:   PHQ-9 SCORE 4/12/2022   PHQ-9 Total Score MyChart 0   PHQ-9 Total Score 0     Previous CHRISTINA-7:   CHRISTINA-7 SCORE 4/12/2022   Total Score 5 (mild anxiety)   Total Score 5       JONNATHAN LEVEL:  No flowsheet data found.    DATA  Extended Session (60+ minutes): No  Interactive Complexity: No  Crisis: No  [facility] Patient: No    Treatment Objective(s) Addressed in This Session:  Target Behavior(s): disease management/lifestyle changes , mental health management    Anxiety: will experience a reduction in anxiety and will develop more effective coping skills to manage anxiety symptoms  Adjustment  Difficulties: will develop coping/problem-solving skills to facilitate more adaptive adjustment    Current Stressors / Issues:  Follow up session from ART session. Patient stated that overall she feels like the ART was extremely beneficial and helpful for her. She stated that she has found herself ruminating less and feeling less of a need to talk to others about her postpartum hemorrhage than before. Patient stated that this has been helpful for her and feels like she can shift her focus and talk about other things which she appreciates. Patient anticipates that she was able to better cope with recent plans being changed because of the ART session. Patient shared that she has also noticed how much blame she placed on herself because they chose to go out of town in early stages of bleeding, and had previously thought if she had not gone out of town, there would have been no trauma. Patient is now recognizing that there were multiple layers to that experience and she cannot blame herself for going out of town at the time.  Explored with patient how she feels about range of emotions as she prepares for transition back to work. Patient with recent panic attack and has questions about how to manage symptoms.     Progress on Treatment Objective(s) / Homework:  Satisfactory progress - ACTION (Actively working towards change); Intervened by reinforcing change plan / affirming steps taken    Motivational Interviewing    MI Intervention: Expressed Empathy/Understanding, Open-ended questions, Reflections: simple and complex, Change talk (evoked) and Reframe     Change Talk Expressed by the Patient: Desire to change Ability to change Reasons to change    Provider Response to Change Talk: E - Evoked more info from patient about behavior change, A - Affirmed patient's thoughts, decisions, or attempts at behavior change, R - Reflected patient's change talk and S - Summarized patient's change talk statements    Cognitive  Behavioral Therapy: Discussed connection between thoughts, feelings, and behaviors. Taught patient how to identify cognitive distortions, and assisted patient to identify own cognitive distortions. Taught and engaged patient in cognitive restructuring techniques.    Also provided psychoeducation about behavioral health condition, symptoms, and treatment options    Care Plan review completed: No    Medication Review:  No current psychiatric medications prescribed    Medication Compliance:  NA    Changes in Health Issues:   None reported    Chemical Use Review:   Substance Use: Chemical use reviewed, no active concerns identified      Tobacco Use: No current tobacco use.      Assessment: Current Emotional / Mental Status (status of significant symptoms):  Risk status (Self / Other harm or suicidal ideation)  Patient denies a history of suicidal ideation, suicide attempts, self-injurious behavior, homicidal ideation, homicidal behavior and and other safety concerns  Patient denies current fears or concerns for personal safety.  Patient denies current or recent suicidal ideation or behaviors.  Patient denies current or recent homicidal ideation or behaviors.  Patient denies current or recent self injurious behavior or ideation.  Patient denies other safety concerns.  A safety and risk management plan has not been developed at this time, however patient was encouraged to call [place] [phone] should there be a change in any of these risk factors.    Appearance:   Appropriate   Eye Contact:   Good   Psychomotor Behavior: Normal   Attitude:   Cooperative   Orientation:   All  Speech   Rate / Production: Normal    Volume:  Normal   Mood:    Normal  Affect:    Appropriate   Thought Content:  Clear   Thought Form:  Coherent  Logical   Insight:    Good     Diagnoses:  1. Adjustment disorder with mixed anxiety and depressed mood        Collateral Reports Completed:  Not Applicable    Plan: (Homework, other):  Patient was  given information about behavioral services and encouraged to schedule a follow up appointment with the clinic [facility] in 4 weeks. Patient informed of provider's upcoming resignation. Patient to have one follow up visit after return to work and will plan follow up as needed following from [facility], [facility]. CD Recommendations: No indications of CD issues.  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    Anticipated number of session for this episode of care: 3-4  Anticipation frequency of session: Every other week/every 3 weeks  Anticipated Duration of each session: 38-52 minutes  Treatment plan will be reviewed in 90 days or when goals have been changed.       MeasurableTreatment Goal(s) related to diagnosis / functional impairment(s)  Goal 1: Patient will reduce symptoms of anxiety as evidenced by decreased score on CHRISTINA 7    I will know I've met my goal when I feel like I can trust my body again.      Objective #A (Patient Action)    Patient will use cognitive strategies identified in therapy to challenge anxious thoughts.  Status: New - Date: 4/12/22     Intervention(s)  Therapist will teach cognitive restructuring and defusion techniques.    Objective #B  Patient will  identify three distraction and diversion activities and use those activities to decrease level of anxiety  .  Status: New - Date: 4/12/22     Intervention(s)  Therapist will teach distress tolerance, mindfulness, and relaxation techniques.    Objective #C  Patient will reduce MARTIN when thinking about her postpartum hemorrhage and D&C.  Status: New - Date: 4/12/22     Intervention(s)  Therapist will provide Accelerated Resolution Therapy session.    Patient has reviewed and agreed to the above plan.      Kasey Mohr, PAPITO  April 12, 2022

## 2022-05-18 ENCOUNTER — LAB (OUTPATIENT)
Dept: LAB | Facility: CLINIC | Age: 22
End: 2022-05-18
Payer: COMMERCIAL

## 2022-05-18 DIAGNOSIS — Z86.16 HISTORY OF COVID-19: ICD-10-CM

## 2022-05-18 DIAGNOSIS — D50.9 IRON DEFICIENCY ANEMIA, UNSPECIFIED IRON DEFICIENCY ANEMIA TYPE: ICD-10-CM

## 2022-05-18 DIAGNOSIS — R79.89 LOW TSH LEVEL: ICD-10-CM

## 2022-05-18 DIAGNOSIS — Z13.220 SCREENING FOR LIPID DISORDERS: ICD-10-CM

## 2022-05-18 DIAGNOSIS — Z00.00 ROUTINE GENERAL MEDICAL EXAMINATION AT A HEALTH CARE FACILITY: ICD-10-CM

## 2022-05-18 LAB
CHOLEST SERPL-MCNC: 194 MG/DL
FASTING STATUS PATIENT QL REPORTED: YES
FASTING STATUS PATIENT QL REPORTED: YES
GLUCOSE BLD-MCNC: 78 MG/DL (ref 70–125)
HDLC SERPL-MCNC: 74 MG/DL
HGB BLD-MCNC: 12.4 G/DL (ref 11.7–15.7)
LDLC SERPL CALC-MCNC: 112 MG/DL
T3FREE SERPL-MCNC: 1.9 PG/ML (ref 1.6–3.9)
T4 FREE SERPL-MCNC: 0.73 NG/DL (ref 0.7–1.8)
TRIGL SERPL-MCNC: 42 MG/DL
TSH SERPL DL<=0.005 MIU/L-ACNC: 1.77 UIU/ML (ref 0.3–5)

## 2022-05-18 PROCEDURE — 84439 ASSAY OF FREE THYROXINE: CPT

## 2022-05-18 PROCEDURE — 36415 COLL VENOUS BLD VENIPUNCTURE: CPT

## 2022-05-18 PROCEDURE — 99000 SPECIMEN HANDLING OFFICE-LAB: CPT

## 2022-05-18 PROCEDURE — 84443 ASSAY THYROID STIM HORMONE: CPT

## 2022-05-18 PROCEDURE — 86769 SARS-COV-2 COVID-19 ANTIBODY: CPT | Mod: 90

## 2022-05-18 PROCEDURE — 80061 LIPID PANEL: CPT

## 2022-05-18 PROCEDURE — 82947 ASSAY GLUCOSE BLOOD QUANT: CPT

## 2022-05-18 PROCEDURE — 84481 FREE ASSAY (FT-3): CPT

## 2022-05-18 PROCEDURE — 85018 HEMOGLOBIN: CPT

## 2022-05-19 ENCOUNTER — MYC MEDICAL ADVICE (OUTPATIENT)
Dept: FAMILY MEDICINE | Facility: CLINIC | Age: 22
End: 2022-05-19
Payer: COMMERCIAL

## 2022-05-19 DIAGNOSIS — F41.9 ANXIETY: Primary | ICD-10-CM

## 2022-05-19 LAB
SARS-COV-2 AB SERPL IA-ACNC: >250 U/ML
SARS-COV-2 AB SERPL QL IA: POSITIVE

## 2022-05-19 NOTE — TELEPHONE ENCOUNTER
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      I forgot to discuss with you at my annual physical appointment that I have her been having some anxiety about returning to work.      I have been meeting with Allegra Mohr at the [facility] who works with women pregnant and postpartum for mental health reasons. In my appointment yesterday we discussed may be coming up with a plan involving medications if I have a panic attack at work, especially during those first few weeks back.      I previously was on Ativan and Zofran for these episodes, but I wasn t sure if that would be the most compatible plan with breast-feeding?  I would be willing to just pump and discard my milk if that was some thing I needed to do in order to have emergency anxiety medications on hand.      Overall, my anxiety has been fairly well managed lately but I have experienced one or two panic episodes where I felt out of control and it would bring me a lot of comfort to have a plan in place if that happens while I m at work.     Let me know if you have any ideas! I really appreciate your help. If you have any questions Allegra Mohr said she can discuss this in more detail directly with you if you d like.       Thanks!

## 2022-05-19 NOTE — TELEPHONE ENCOUNTER
Please have her schedule a virtual visit with me and let her know I sent a message to Kasey Mohr to discuss.     Thanks.    Paige Mills MD  [facility]  [phone]

## 2022-05-23 RX ORDER — HYDROXYZINE HYDROCHLORIDE 25 MG/1
12.5-25 TABLET, FILM COATED ORAL EVERY 8 HOURS PRN
Qty: 30 TABLET | Refills: 1 | Status: SHIPPED | OUTPATIENT
Start: 2022-05-23 | End: 2023-01-10

## 2022-06-10 ENCOUNTER — VIRTUAL VISIT (OUTPATIENT)
Dept: BEHAVIORAL HEALTH | Facility: CLINIC | Age: 22
End: 2022-06-10
Payer: COMMERCIAL

## 2022-06-10 DIAGNOSIS — F43.23 ADJUSTMENT DISORDER WITH MIXED ANXIETY AND DEPRESSED MOOD: Primary | ICD-10-CM

## 2022-06-10 PROCEDURE — 90834 PSYTX W PT 45 MINUTES: CPT | Mod: 95 | Performed by: SOCIAL WORKER

## 2022-06-10 ASSESSMENT — ANXIETY QUESTIONNAIRES
5. BEING SO RESTLESS THAT IT IS HARD TO SIT STILL: SEVERAL DAYS
3. WORRYING TOO MUCH ABOUT DIFFERENT THINGS: SEVERAL DAYS
4. TROUBLE RELAXING: SEVERAL DAYS
8. IF YOU CHECKED OFF ANY PROBLEMS, HOW DIFFICULT HAVE THESE MADE IT FOR YOU TO DO YOUR WORK, TAKE CARE OF THINGS AT HOME, OR GET ALONG WITH OTHER PEOPLE?: NOT DIFFICULT AT ALL
2. NOT BEING ABLE TO STOP OR CONTROL WORRYING: SEVERAL DAYS
6. BECOMING EASILY ANNOYED OR IRRITABLE: SEVERAL DAYS
GAD7 TOTAL SCORE: 7
7. FEELING AFRAID AS IF SOMETHING AWFUL MIGHT HAPPEN: NOT AT ALL
1. FEELING NERVOUS, ANXIOUS, OR ON EDGE: MORE THAN HALF THE DAYS
7. FEELING AFRAID AS IF SOMETHING AWFUL MIGHT HAPPEN: NOT AT ALL
GAD7 TOTAL SCORE: 7
GAD7 TOTAL SCORE: 7

## 2022-06-10 ASSESSMENT — PATIENT HEALTH QUESTIONNAIRE - PHQ9
SUM OF ALL RESPONSES TO PHQ QUESTIONS 1-9: 2
10. IF YOU CHECKED OFF ANY PROBLEMS, HOW DIFFICULT HAVE THESE PROBLEMS MADE IT FOR YOU TO DO YOUR WORK, TAKE CARE OF THINGS AT HOME, OR GET ALONG WITH OTHER PEOPLE: NOT DIFFICULT AT ALL
SUM OF ALL RESPONSES TO PHQ QUESTIONS 1-9: 2

## 2022-06-10 NOTE — PROGRESS NOTES
[facility]if[facility] Clinic- Integrated Behavioral Health Services  Enma 10, 2022    Behavioral Health Clinician Progress Note    Patient Name: Litzy Hatch      Telemedicine Visit: The patient's condition can be safely assessed and treated via synchronous audio and visual telemedicine encounter.      Reason for Telemedicine Visit: Patient has requested telehealth visit    Originating Site (Patient Location): Patient's home    Distant Site (Provider Location): [facility] Clinics: [facility]    Consent:  The patient/guardian has verbally consented to: the potential risks and benefits of telemedicine (video visit) versus in person care; bill my insurance or make self-payment for services provided; and responsibility for payment of non-covered services.     Mode of Communication:  Video Conference via [employer]    As the provider I attest to compliance with applicable laws and regulations related to telemedicine.         Service Type:  Individual     Session Start Time: 9:05 am  Session End Time: 9:55 am       Session Length: 38 - 52      Attendees: Patient    Visit Activities (Refresh list every visit): [facility] Only    Diagnostic Assessment Date: 1/21/22  Treatment Plan Review Date: 4/12/22, next due 7/12/22  See Flowsheets for today's PHQ-9 and CHRISTINA-7 results  Previous PHQ-9:   PHQ-9 SCORE 4/12/2022 6/10/2022   PHQ-9 Total Score MyChart 0 2 (Minimal depression)   PHQ-9 Total Score 0 2     Previous CHRISTINA-7:   CHRISTINA-7 SCORE 4/12/2022 6/10/2022   Total Score 5 (mild anxiety) 7 (mild anxiety)   Total Score 5 7       JONNATHAN LEVEL:  No flowsheet data found.    DATA  Extended Session (60+ minutes): No  Interactive Complexity: No  Crisis: No  [facility] Patient: No    Treatment Objective(s) Addressed in This Session:  Target Behavior(s): disease management/lifestyle changes , mental health management    Anxiety: will experience a reduction in anxiety and will develop more  effective coping skills to manage anxiety symptoms  Adjustment Difficulties: will develop coping/problem-solving skills to facilitate more adaptive adjustment    Current Stressors / Issues:  Patient reported overall doing well. She stated that she has transitioned back to work, worked two days this past week. She discussed significant anxiety surrounding her return to work which led to her waking up around midnight with a significant panic attack. Patient stated that the transition back to work went better than she anticipated, she felt well supported, and has felt highly accomplished and strong as a result of returning to work. Patient was open to continuing explore and process how the transition felt as she went back to work and how to navigate this next phase. Discussed being mindful and present when at home, and patient stated that she does not currently feel dread when she thinks about working next week.    Patient overall feels like the ART session continues to helpful with lasting positive impacts. She shared that she had her first menstrual cycle, talked about her hemorrhage, heard about a co-worker's experience of early bleeding in pregnancy, and saw blood without work without difficult.    Progress on Treatment Objective(s) / Homework:  Satisfactory progress - ACTION (Actively working towards change); Intervened by reinforcing change plan / affirming steps taken    Motivational Interviewing    MI Intervention: Expressed Empathy/Understanding, Open-ended questions, Reflections: simple and complex, Change talk (evoked) and Reframe     Change Talk Expressed by the Patient: Desire to change Ability to change Reasons to change    Provider Response to Change Talk: E - Evoked more info from patient about behavior change, A - Affirmed patient's thoughts, decisions, or attempts at behavior change, R - Reflected patient's change talk and S - Summarized patient's change talk statements    Cognitive Behavioral  Therapy: Discussed connection between thoughts, feelings, and behaviors. Taught patient how to identify cognitive distortions, and assisted patient to identify own cognitive distortions. Taught and engaged patient in cognitive restructuring techniques.    Also provided psychoeducation about behavioral health condition, symptoms, and treatment options    Care Plan review completed: No    Medication Review:  Changes to psychiatric medications, see updated Medication List in EPIC.     Medication Compliance:  Yes , medications PRN    Changes in Health Issues:   None reported    Chemical Use Review:   Substance Use: Chemical use reviewed, no active concerns identified      Tobacco Use: No current tobacco use.      Assessment: Current Emotional / Mental Status (status of significant symptoms):  Risk status (Self / Other harm or suicidal ideation)  Patient denies a history of suicidal ideation, suicide attempts, self-injurious behavior, homicidal ideation, homicidal behavior and and other safety concerns  Patient denies current fears or concerns for personal safety.  Patient denies current or recent suicidal ideation or behaviors.  Patient denies current or recent homicidal ideation or behaviors.  Patient denies current or recent self injurious behavior or ideation.  Patient denies other safety concerns.  A safety and risk management plan has not been developed at this time, however patient was encouraged to call [place] [phone] should there be a change in any of these risk factors.    Appearance:   Appropriate   Eye Contact:   Good   Psychomotor Behavior: Normal   Attitude:   Cooperative   Orientation:   All  Speech   Rate / Production: Normal    Volume:  Normal   Mood:    Normal  Affect:    Appropriate   Thought Content:  Clear   Thought Form:  Coherent  Logical   Insight:    Good     Diagnoses:  1. Adjustment disorder with mixed anxiety and depressed mood        Collateral Reports Completed:  Not Applicable    Plan:  (Homework, other):  Today was patient's last visit with this provider. Patient has previously been informed of provider's upcoming resignation. Patient to follow up with [facility] through work or with [facility] Bernal [facility] as needed in the future. Discussed potential ART in the future for work related traumas, with patient expressing interest in learning how to access an ART therapist in the future if needed. Information provided to patient via [employer]. CD Recommendations: No indications of CD issues.  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    Anticipated number of session for this episode of care: 3-4  Anticipation frequency of session: Every other week/every 3 weeks  Anticipated Duration of each session: 38-52 minutes  Treatment plan will be reviewed in 90 days or when goals have been changed.       MeasurableTreatment Goal(s) related to diagnosis / functional impairment(s)  Goal 1: Patient will reduce symptoms of anxiety as evidenced by decreased score on CHRISTINA 7    I will know I've met my goal when I feel like I can trust my body again.      Objective #A (Patient Action)    Patient will use cognitive strategies identified in therapy to challenge anxious thoughts.  Status: New  - Date: 4/12/22     Intervention(s)  Therapist will teach cognitive restructuring and defusion techniques.    Objective #B  Patient will identify three distraction and diversion activities and use those activities to decrease level of anxiety  .  Status: New - Date: 4/12/22     Intervention(s)  Therapist will teach distress tolerance, mindfulness, and relaxation techniques.    Objective #C  Patient will reduce MARTIN when thinking about her postpartum hemorrhage and D&C.  Status: New - Date: 4/12/22     Intervention(s)  Therapist will provide Accelerated Resolution Therapy session.    Patient has reviewed and agreed to the above plan.      Kasey Mohr, [facility]  April 12, 2022

## 2022-06-13 ENCOUNTER — MEDICAL CORRESPONDENCE (OUTPATIENT)
Dept: HEALTH INFORMATION MANAGEMENT | Facility: CLINIC | Age: 22
End: 2022-06-13
Payer: COMMERCIAL

## 2022-06-26 ENCOUNTER — TRANSFERRED RECORDS (OUTPATIENT)
Dept: FAMILY MEDICINE | Facility: CLINIC | Age: 22
End: 2022-06-26

## 2022-07-21 ENCOUNTER — MYC MEDICAL ADVICE (OUTPATIENT)
Dept: FAMILY MEDICINE | Facility: CLINIC | Age: 22
End: 2022-07-21

## 2022-07-21 DIAGNOSIS — M21.42 FLAT FEET, BILATERAL: Primary | ICD-10-CM

## 2022-07-21 DIAGNOSIS — M21.41 FLAT FEET, BILATERAL: Primary | ICD-10-CM

## 2022-07-28 NOTE — TELEPHONE ENCOUNTER
Referral signed. Reception -please call and clarify which DME store she wants this faxed to.    Paige Mills MD  [facility]  [phone]

## 2022-07-29 NOTE — TELEPHONE ENCOUNTER
Patient is trying to get a referral yo have the actual custom orthotics made for her feet.  Geovanna Montalvo RN  [facility]

## 2022-08-02 NOTE — TELEPHONE ENCOUNTER
Patient responded, chose [place] location to send order   Faxed to [phone]  Copy kept in clinic  Patient informed

## 2022-08-02 NOTE — TELEPHONE ENCOUNTER
LVM for patient that the [place] location is no longer open. Listed other options and instructed to call back/send [employer] message.     Does appear patient has read [employer] message from 8/1. If no response by 8/4 will place in mail and send patient MyC letting her know step in process

## 2022-08-15 ENCOUNTER — MEDICAL CORRESPONDENCE (OUTPATIENT)
Dept: FAMILY MEDICINE | Facility: CLINIC | Age: 22
End: 2022-08-15

## 2022-09-06 DIAGNOSIS — E03.8 SUBCLINICAL HYPOTHYROIDISM: Primary | ICD-10-CM

## 2022-09-24 ENCOUNTER — HEALTH MAINTENANCE LETTER (OUTPATIENT)
Age: 22
End: 2022-09-24

## 2022-09-27 ENCOUNTER — LAB (OUTPATIENT)
Dept: LAB | Facility: CLINIC | Age: 22
End: 2022-09-27
Payer: COMMERCIAL

## 2022-09-27 DIAGNOSIS — E03.8 SUBCLINICAL HYPOTHYROIDISM: ICD-10-CM

## 2022-09-27 PROCEDURE — 84439 ASSAY OF FREE THYROXINE: CPT

## 2022-09-27 PROCEDURE — 36415 COLL VENOUS BLD VENIPUNCTURE: CPT

## 2022-09-27 PROCEDURE — 84443 ASSAY THYROID STIM HORMONE: CPT

## 2022-09-28 LAB
T4 FREE SERPL-MCNC: 1.06 NG/DL (ref 0.76–1.46)
TSH SERPL DL<=0.005 MIU/L-ACNC: 5.76 MU/L (ref 0.4–4)

## 2022-10-04 ENCOUNTER — VIRTUAL VISIT (OUTPATIENT)
Dept: ENDOCRINOLOGY | Facility: CLINIC | Age: 22
End: 2022-10-04
Payer: COMMERCIAL

## 2022-10-04 DIAGNOSIS — E03.8 SUBCLINICAL HYPOTHYROIDISM: Primary | ICD-10-CM

## 2022-10-04 PROCEDURE — 99214 OFFICE O/P EST MOD 30 MIN: CPT | Mod: 95

## 2022-10-04 RX ORDER — LEVOTHYROXINE SODIUM 88 UG/1
88 TABLET ORAL DAILY
Qty: 90 TABLET | Refills: 3 | Status: SHIPPED | OUTPATIENT
Start: 2022-10-04 | End: 2022-11-07 | Stop reason: ALTCHOICE

## 2022-10-04 ASSESSMENT — ENCOUNTER SYMPTOMS
WEAKNESS: 0
VOMITING: 0
DIARRHEA: 0
PARALYSIS: 0
HALLUCINATIONS: 0
NERVOUS/ANXIOUS: 1
ALTERED TEMPERATURE REGULATION: 1
ABDOMINAL PAIN: 1
PANIC: 0
SPEECH CHANGE: 0
POLYPHAGIA: 1
NAUSEA: 1
MEMORY LOSS: 0
LOSS OF CONSCIOUSNESS: 0
INSOMNIA: 1
CONSTIPATION: 1
DISTURBANCES IN COORDINATION: 0
HEADACHES: 1
CHILLS: 0
TREMORS: 0
FEVER: 0
POLYDIPSIA: 1
NUMBNESS: 0
DEPRESSION: 0
JAUNDICE: 0
WEIGHT LOSS: 0
RECTAL PAIN: 0
INCREASED ENERGY: 1
DECREASED APPETITE: 0
TINGLING: 0
NIGHT SWEATS: 0
DIZZINESS: 0
BLOOD IN STOOL: 0
SEIZURES: 0
WEIGHT GAIN: 1
FATIGUE: 1
BLOATING: 1
BOWEL INCONTINENCE: 0
HEARTBURN: 0
DECREASED CONCENTRATION: 0
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
## 2022-10-31 ENCOUNTER — LAB (OUTPATIENT)
Dept: LAB | Facility: CLINIC | Age: 22
End: 2022-10-31
Payer: COMMERCIAL

## 2022-10-31 DIAGNOSIS — E03.8 SUBCLINICAL HYPOTHYROIDISM: ICD-10-CM

## 2022-10-31 LAB
T4 FREE SERPL-MCNC: 1.41 NG/DL (ref 0.9–1.7)
TSH SERPL DL<=0.005 MIU/L-ACNC: 4.69 UIU/ML (ref 0.3–4.2)

## 2022-10-31 PROCEDURE — 84439 ASSAY OF FREE THYROXINE: CPT

## 2022-10-31 PROCEDURE — 84443 ASSAY THYROID STIM HORMONE: CPT

## 2022-10-31 PROCEDURE — 36415 COLL VENOUS BLD VENIPUNCTURE: CPT

## 2022-11-07 DIAGNOSIS — E03.8 SUBCLINICAL HYPOTHYROIDISM: Primary | ICD-10-CM

## 2022-11-07 RX ORDER — LEVOTHYROXINE SODIUM 88 MCG
88 TABLET ORAL DAILY
Qty: 90 TABLET | Refills: 4 | Status: SHIPPED | OUTPATIENT
Start: 2022-11-07 | End: 2022-12-12

## 2022-11-15 ENCOUNTER — TELEPHONE (OUTPATIENT)
Dept: ENDOCRINOLOGY | Facility: CLINIC | Age: 22
End: 2022-11-15

## 2022-11-15 NOTE — TELEPHONE ENCOUNTER
Prior Authorization Retail Medication Request    Medication/Dose: Synthroid 88 mcg Tablets *JORGE 1*  ICD code (if different than what is on RX):    Previously Tried and Failed:    Rationale:      Insurance Name:  [facility]  Insurance ID:  [id number]    Thank you,  Sherry Hazel, [facility]  Technician Supervisor  [facility]  [phone]

## 2022-11-16 NOTE — TELEPHONE ENCOUNTER
PA Initiation    Medication: SYNTHROID 88 MCG tablet JORGE  Insurance Company: [employer] - Phone [phone] Fax [phone]  Pharmacy Filling the Rx: [place], [place] - [address]  Filling Pharmacy Phone: [phone]  Filling Pharmacy Fax:    Start Date: 11/16/2022

## 2022-11-18 NOTE — TELEPHONE ENCOUNTER
Prior Authorization Approval    Authorization Effective Date: 11/16/2022  Authorization Expiration Date: 5/15/2023  Medication: SYNTHROID 88 MCG tablet JORGE--APPROVED  Approved Dose/Quantity:   Reference #:     Insurance Company: [employer] - Phone [phone] Fax [phone]  Expected CoPay:       CoPay Card Available:      Foundation Assistance Needed:    Which Pharmacy is filling the prescription (Not needed for infusion/clinic administered): [facility] [place], [place] - [address]  Pharmacy Notified: Yes  Patient Notified: Yes **Instructed pharmacy to notify patient when script is ready to /ship.**

## 2022-12-02 NOTE — PROCEDURE: COUNSELING
Opt out
Birth Control Pills Pregnancy And Lactation Text: This medication should be avoided if pregnant and for the first 30 days post-partum.
Topical Retinoid Pregnancy And Lactation Text: This medication is Pregnancy Category C. It is unknown if this medication is excreted in breast milk.
Azelaic Acid Pregnancy And Lactation Text: This medication is considered safe during pregnancy and breast feeding.
Tazorac Pregnancy And Lactation Text: This medication is not safe during pregnancy. It is unknown if this medication is excreted in breast milk.
Sarecycline Counseling: Patient advised regarding possible photosensitivity and discoloration of the teeth, skin, lips, tongue and gums.  Patient instructed to avoid sunlight, if possible.  When exposed to sunlight, patients should wear protective clothing, sunglasses, and sunscreen.  The patient was instructed to call the office immediately if the following severe adverse effects occur:  hearing changes, easy bruising/bleeding, severe headache, or vision changes.  The patient verbalized understanding of the proper use and possible adverse effects of sarecycline.  All of the patient's questions and concerns were addressed.
Include Pregnancy/Lactation Warning?: No
Azithromycin Counseling:  I discussed with the patient the risks of azithromycin including but not limited to GI upset, allergic reaction, drug rash, diarrhea, and yeast infections.
Erythromycin Counseling:  I discussed with the patient the risks of erythromycin including but not limited to GI upset, allergic reaction, drug rash, diarrhea, increase in liver enzymes, and yeast infections.
Benzoyl Peroxide Pregnancy And Lactation Text: This medication is Pregnancy Category C. It is unknown if benzoyl peroxide is excreted in breast milk.
Topical Sulfur Applications Pregnancy And Lactation Text: This medication is Pregnancy Category C and has an unknown safety profile during pregnancy. It is unknown if this topical medication is excreted in breast milk.
Spironolactone Counseling: Patient advised regarding risks of diarrhea, abdominal pain, hyperkalemia, birth defects (for female patients), liver toxicity and renal toxicity. The patient may need blood work to monitor liver and kidney function and potassium levels while on therapy. The patient verbalized understanding of the proper use and possible adverse effects of spironolactone.  All of the patient's questions and concerns were addressed.
Topical Clindamycin Counseling: Patient counseled that this medication may cause skin irritation or allergic reactions.  In the event of skin irritation, the patient was advised to reduce the amount of the drug applied or use it less frequently.   The patient verbalized understanding of the proper use and possible adverse effects of clindamycin.  All of the patient's questions and concerns were addressed.
Bactrim Pregnancy And Lactation Text: This medication is Pregnancy Category D and is known to cause fetal risk.  It is also excreted in breast milk.
Bactrim Counseling:  I discussed with the patient the risks of sulfa antibiotics including but not limited to GI upset, allergic reaction, drug rash, diarrhea, dizziness, photosensitivity, and yeast infections.  Rarely, more serious reactions can occur including but not limited to aplastic anemia, agranulocytosis, methemoglobinemia, blood dyscrasias, liver or kidney failure, lung infiltrates or desquamative/blistering drug rashes.
High Dose Vitamin A Pregnancy And Lactation Text: High dose vitamin A therapy is contraindicated during pregnancy and breast feeding.
Isotretinoin Pregnancy And Lactation Text: This medication is Pregnancy Category X and is considered extremely dangerous during pregnancy. It is unknown if it is excreted in breast milk.
Spironolactone Pregnancy And Lactation Text: This medication can cause feminization of the male fetus and should be avoided during pregnancy. The active metabolite is also found in breast milk.
Erythromycin Pregnancy And Lactation Text: This medication is Pregnancy Category B and is considered safe during pregnancy. It is also excreted in breast milk.
Tazorac Counseling:  Patient advised that medication is irritating and drying.  Patient may need to apply sparingly and wash off after an hour before eventually leaving it on overnight.  The patient verbalized understanding of the proper use and possible adverse effects of tazorac.  All of the patient's questions and concerns were addressed.
Topical Clindamycin Pregnancy And Lactation Text: This medication is Pregnancy Category B and is considered safe during pregnancy. It is unknown if it is excreted in breast milk.
Tetracycline Pregnancy And Lactation Text: This medication is Pregnancy Category D and not consider safe during pregnancy. It is also excreted in breast milk.
Topical Retinoid counseling:  Patient advised to apply a pea-sized amount only at bedtime and wait 30 minutes after washing their face before applying.  If too drying, patient may add a non-comedogenic moisturizer. The patient verbalized understanding of the proper use and possible adverse effects of retinoids.  All of the patient's questions and concerns were addressed.
Doxycycline Counseling:  Patient counseled regarding possible photosensitivity and increased risk for sunburn.  Patient instructed to avoid sunlight, if possible.  When exposed to sunlight, patients should wear protective clothing, sunglasses, and sunscreen.  The patient was instructed to call the office immediately if the following severe adverse effects occur:  hearing changes, easy bruising/bleeding, severe headache, or vision changes.  The patient verbalized understanding of the proper use and possible adverse effects of doxycycline.  All of the patient's questions and concerns were addressed.
Benzoyl Peroxide Counseling: Patient counseled that medicine may cause skin irritation and bleach clothing.  In the event of skin irritation, the patient was advised to reduce the amount of the drug applied or use it less frequently.   The patient verbalized understanding of the proper use and possible adverse effects of benzoyl peroxide.  All of the patient's questions and concerns were addressed.
Isotretinoin Counseling: Patient should get monthly blood tests, not donate blood, not drive at night if vision affected, not share medication, and not undergo elective surgery for 6 months after tx completed. Side effects reviewed, pt to contact office should one occur.
Detail Level: Zone
Winlevi Counseling:  I discussed with the patient the risks of topical clascoterone including but not limited to erythema, scaling, itching, and stinging. Patient voiced their understanding.
Dapsone Counseling: I discussed with the patient the risks of dapsone including but not limited to hemolytic anemia, agranulocytosis, rashes, methemoglobinemia, kidney failure, peripheral neuropathy, headaches, GI upset, and liver toxicity.  Patients who start dapsone require monitoring including baseline LFTs and weekly CBCs for the first month, then every month thereafter.  The patient verbalized understanding of the proper use and possible adverse effects of dapsone.  All of the patient's questions and concerns were addressed.
Patient Specific Counseling (Will Not Stick From Patient To Patient): Discussed skin care products with patient today. Hyloronic and Glycolic acid.
Doxycycline Pregnancy And Lactation Text: This medication is Pregnancy Category D and not consider safe during pregnancy. It is also excreted in breast milk but is considered safe for shorter treatment courses.
Birth Control Pills Counseling: Birth Control Pill Counseling: I discussed with the patient the potential side effects of OCPs including but not limited to increased risk of stroke, heart attack, thrombophlebitis, deep venous thrombosis, hepatic adenomas, breast changes, GI upset, headaches, and depression.  The patient verbalized understanding of the proper use and possible adverse effects of OCPs. All of the patient's questions and concerns were addressed.
Dapsone Pregnancy And Lactation Text: This medication is Pregnancy Category C and is not considered safe during pregnancy or breast feeding.
Minocycline Counseling: Patient advised regarding possible photosensitivity and discoloration of the teeth, skin, lips, tongue and gums.  Patient instructed to avoid sunlight, if possible.  When exposed to sunlight, patients should wear protective clothing, sunglasses, and sunscreen.  The patient was instructed to call the office immediately if the following severe adverse effects occur:  hearing changes, easy bruising/bleeding, severe headache, or vision changes.  The patient verbalized understanding of the proper use and possible adverse effects of minocycline.  All of the patient's questions and concerns were addressed.
Azithromycin Pregnancy And Lactation Text: This medication is considered safe during pregnancy and is also secreted in breast milk.
Winlevi Pregnancy And Lactation Text: This medication is considered safe during pregnancy and breastfeeding.
Azelaic Acid Counseling: Patient counseled that medicine may cause skin irritation and to avoid applying near the eyes.  In the event of skin irritation, the patient was advised to reduce the amount of the drug applied or use it less frequently.   The patient verbalized understanding of the proper use and possible adverse effects of azelaic acid.  All of the patient's questions and concerns were addressed.
Tetracycline Counseling: Patient counseled regarding possible photosensitivity and increased risk for sunburn.  Patient instructed to avoid sunlight, if possible.  When exposed to sunlight, patients should wear protective clothing, sunglasses, and sunscreen.  The patient was instructed to call the office immediately if the following severe adverse effects occur:  hearing changes, easy bruising/bleeding, severe headache, or vision changes.  The patient verbalized understanding of the proper use and possible adverse effects of tetracycline.  All of the patient's questions and concerns were addressed. Patient understands to avoid pregnancy while on therapy due to potential birth defects.
High Dose Vitamin A Counseling: Side effects reviewed, pt to contact office should one occur.
Topical Sulfur Applications Counseling: Topical Sulfur Counseling: Patient counseled that this medication may cause skin irritation or allergic reactions.  In the event of skin irritation, the patient was advised to reduce the amount of the drug applied or use it less frequently.   The patient verbalized understanding of the proper use and possible adverse effects of topical sulfur application.  All of the patient's questions and concerns were addressed.

## 2022-12-12 ENCOUNTER — LAB (OUTPATIENT)
Dept: LAB | Facility: CLINIC | Age: 22
End: 2022-12-12
Payer: COMMERCIAL

## 2022-12-12 DIAGNOSIS — E03.8 SUBCLINICAL HYPOTHYROIDISM: ICD-10-CM

## 2022-12-12 LAB
T4 FREE SERPL-MCNC: 1.33 NG/DL (ref 0.9–1.7)
TSH SERPL DL<=0.005 MIU/L-ACNC: 5.17 UIU/ML (ref 0.3–4.2)

## 2022-12-12 PROCEDURE — 84443 ASSAY THYROID STIM HORMONE: CPT

## 2022-12-12 PROCEDURE — 36415 COLL VENOUS BLD VENIPUNCTURE: CPT

## 2022-12-12 PROCEDURE — 84439 ASSAY OF FREE THYROXINE: CPT

## 2022-12-12 RX ORDER — LEVOTHYROXINE SODIUM 88 MCG
TABLET ORAL
Qty: 96 TABLET | Refills: 4 | Status: SHIPPED | OUTPATIENT
Start: 2022-12-12 | End: 2023-02-02 | Stop reason: DRUGHIGH

## 2023-01-10 ENCOUNTER — VIRTUAL VISIT (OUTPATIENT)
Dept: FAMILY MEDICINE | Facility: CLINIC | Age: 23
End: 2023-01-10
Payer: COMMERCIAL

## 2023-01-10 DIAGNOSIS — Z00.00 ENCOUNTER FOR PREVENTIVE CARE: ICD-10-CM

## 2023-01-10 DIAGNOSIS — E06.3 HASHIMOTO'S THYROIDITIS: ICD-10-CM

## 2023-01-10 DIAGNOSIS — D80.2 IGA DEFICIENCY (H): Chronic | ICD-10-CM

## 2023-01-10 PROBLEM — Z23 NEED FOR TDAP VACCINATION: Status: RESOLVED | Noted: 2021-06-21 | Resolved: 2023-01-10

## 2023-01-10 PROBLEM — Z36.89 ENCOUNTER FOR TRIAGE IN PREGNANT PATIENT: Status: RESOLVED | Noted: 2022-01-10 | Resolved: 2023-01-10

## 2023-01-10 PROBLEM — Q52.4 SEPTATE HYMEN: Status: RESOLVED | Noted: 2020-12-16 | Resolved: 2023-01-10
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
## 2023-01-10 PROBLEM — Z34.00 SUPERVISION OF NORMAL FIRST PREGNANCY, ANTEPARTUM: Status: RESOLVED | Noted: 2021-07-22 | Resolved: 2023-01-10

## 2023-01-10 PROCEDURE — 99213 OFFICE O/P EST LOW 20 MIN: CPT | Mod: 95 | Performed by: FAMILY MEDICINE

## 2023-01-10 RX ORDER — TRETINOIN 0.5 MG/G
CREAM TOPICAL
COMMUNITY
Start: 2022-04-25 | End: 2023-12-19

## 2023-01-10 NOTE — ASSESSMENT & PLAN NOTE
Recommend establish care visit in person. She lives in [place] which is quite far from my clinic in [facility]

## 2023-01-10 NOTE — PROGRESS NOTES
Litzy is a 22 year old who is being evaluated via a billable video visit.      How would you like to obtain your AVS? MyChart  If the video visit is dropped, the invitation should be resent by: Text to cell phone: [phone]  Will anyone else be joining your video visit? No      Video-Visit Details    Type of service:  Video Visit     Originating Location (pt. Location): Home  Distant Location (provider location):  On-site  Platform used for Video Visit: Coll[employer]    Problem List Items Addressed This Visit        Endocrine    Hashimoto's thyroiditis     Managed by Dr. Kahn.          Relevant Medications    tretinoin (RETIN-A) 0.05 % external cream    Other Relevant Orders    PRIMARY CARE FOLLOW-UP SCHEDULING    Internal Medicine Referral       Immune    IgA deficiency (H) (Chronic)     Diagnosed by Gastroenterology.         Relevant Orders    PRIMARY CARE FOLLOW-UP SCHEDULING    Internal Medicine Referral       Other    Encounter for preventive care     Recommend establish care visit in person. She lives in [place] which is quite far from my clinic in [facility]         Relevant Orders    PRIMARY CARE FOLLOW-UP SCHEDULING      Follow-up Visit   Expected date: Jan 11, 2023      Follow Up Appointment Details:     Follow-up with whom?: Any Primary Taking New Patients    Follow-Up for what?: Chronic Disease f/u    Chronic Disease f/u: General (Other)    Additional Details: establish care    How?: In Person    Is this an as-needed follow-up?: No                     Subjective   Litzy is a 22 year old who presents for the following health issues   Chief Complaint   Patient presents with     Establish Care      History of Present Illness       Hypothyroidism:     Since last visit, patient describes the following symptoms::  Anxiety, Constipation, Dry skin and Fatigue    Headaches:   Since the patient's last clinic visit, headaches are: worsened  The patient is getting headaches:  Daily  She is able to do  "normal daily activities when she has a migraine.  The patient is taking the following rescue/relief medications:  Ibuprofen (Advil, Motrin) and Tylenol   Patient states \"I get some relief\" from the rescue/relief medications.   The patient is taking the following medications to prevent migraines:  No medications to prevent migraines  In the past 4 weeks, the patient has gone to an Urgent Care or Emergency Room 0 times times due to headaches.    She eats 2-3 servings of fruits and vegetables daily.She consumes 0 sweetened beverage(s) daily.She exercises with enough effort to increase her heart rate 10 to 19 minutes per day.  She exercises with enough effort to increase her heart rate 3 or less days per week.   She is taking medications regularly.          Objective           Vitals:  No vitals were obtained today due to virtual visit.    Physical Exam  Constitutional:       Appearance: Normal appearance.   HENT:      Head: Normocephalic and atraumatic.   Pulmonary:      Effort: Pulmonary effort is normal.   Musculoskeletal:      Cervical back: Normal range of motion and neck supple.   Neurological:      General: No focal deficit present.      Mental Status: She is alert and oriented to person, place, and time.   Psychiatric:         Mood and Affect: Mood normal.         Behavior: Behavior normal.         Thought Content: Thought content normal.         Judgment: Judgment normal.            This note has been dictated using voice recognition software. Any grammatical or context distortions are unintentional and inherent to the software      "

## 2023-02-01 ENCOUNTER — LAB (OUTPATIENT)
Dept: LAB | Facility: CLINIC | Age: 23
End: 2023-02-01
Payer: COMMERCIAL

## 2023-02-01 DIAGNOSIS — E03.8 SUBCLINICAL HYPOTHYROIDISM: ICD-10-CM

## 2023-02-01 LAB
T4 FREE SERPL-MCNC: 1.42 NG/DL (ref 0.9–1.7)
TSH SERPL DL<=0.005 MIU/L-ACNC: 3.44 UIU/ML (ref 0.3–4.2)

## 2023-02-01 PROCEDURE — 36415 COLL VENOUS BLD VENIPUNCTURE: CPT

## 2023-02-01 PROCEDURE — 84439 ASSAY OF FREE THYROXINE: CPT

## 2023-02-01 PROCEDURE — 84443 ASSAY THYROID STIM HORMONE: CPT

## 2023-02-02 DIAGNOSIS — E03.8 SUBCLINICAL HYPOTHYROIDISM: Primary | ICD-10-CM

## 2023-02-02 RX ORDER — LEVOTHYROXINE SODIUM 100 MCG
TABLET ORAL
Qty: 96 TABLET | Refills: 4 | Status: SHIPPED | OUTPATIENT
Start: 2023-02-02 | End: 2023-12-01 | Stop reason: DRUGHIGH

## 2023-03-08 ENCOUNTER — MYC MEDICAL ADVICE (OUTPATIENT)
Dept: MIDWIFE SERVICES | Facility: CLINIC | Age: 23
End: 2023-03-08
Payer: COMMERCIAL

## 2023-03-08 ASSESSMENT — ENCOUNTER SYMPTOMS
NUMBNESS: 0
ARTHRALGIAS: 1
LOSS OF CONSCIOUSNESS: 0
SPEECH CHANGE: 0
MUSCLE CRAMPS: 0
MEMORY LOSS: 0
TINGLING: 0
NERVOUS/ANXIOUS: 1
WEAKNESS: 0
DECREASED CONCENTRATION: 0
MUSCLE WEAKNESS: 0
TREMORS: 0
DIZZINESS: 0
JOINT SWELLING: 0
SEIZURES: 0
INSOMNIA: 1
HEADACHES: 1
DISTURBANCES IN COORDINATION: 0
STIFFNESS: 0
PANIC: 1
PARALYSIS: 0
BACK PAIN: 0
MYALGIAS: 0
DEPRESSION: 0
NECK PAIN: 0

## 2023-03-09 ENCOUNTER — TELEPHONE (OUTPATIENT)
Dept: NEUROLOGY | Facility: CLINIC | Age: 23
End: 2023-03-09
Payer: COMMERCIAL

## 2023-03-09 NOTE — TELEPHONE ENCOUNTER
[facility] Call Center    Phone Message    May a detailed message be left on voicemail: yes     Reason for Call: Symptoms or Concerns     If patient has red-flag symptoms, warm transfer to triage line    Current symptom or concern: Hormonal Migraines    Symptoms have been present for: unknown    Has patient previously been seen for this? Yes    By : Liliya Vera MD    Date: 11/05/21    Are there any new or worsening symptoms? Yes: Pt is calling to see Dr. Vera to f/u on migraines. Pt says she is currently weaning her baby from breast milk and it is causing hormonal migraines. She is scheduled to see Dr. Vera on 08/07 but she is wondering if she can be seen sooner. She can see Dr. Vera at the [facility] or in [place]. Please call pt to discuss.      Action Taken: Message routed to:  [facility] Neurology    Travel Screening: Not Applicable

## 2023-03-09 NOTE — CONFIDENTIAL NOTE
13 months PP and weaning breast feeding.  Having 8-9/10 painful headaches.    Saw Neurologist during pregnancy for headaches - thought to be hormonal.     Taking 500 mg of magnesium, 1000 mg Tylenol, and 100 mg of Sumatriptan.    Makes headaches about 5-6/10 pain    No other sings of illness    Recommended cool compress and hydration.    She has appointment with PCP 3/15 but looking for additional recommendations in the meantime.    Any additional recommendations or advisements?    Brigette Douglas RN

## 2023-03-10 NOTE — TELEPHONE ENCOUNTER
Called pt and left message informing her I sent her a my chart message with a sooner appt time with Dr. Vera and am waiting for a reply. Let me know if any other questions by sending a my chart message or call back. Clinic # given.

## 2023-03-15 ENCOUNTER — OFFICE VISIT (OUTPATIENT)
Dept: INTERNAL MEDICINE | Facility: CLINIC | Age: 23
End: 2023-03-15
Payer: COMMERCIAL

## 2023-03-15 VITALS
HEART RATE: 82 BPM | DIASTOLIC BLOOD PRESSURE: 68 MMHG | WEIGHT: 158.2 LBS | BODY MASS INDEX: 23.43 KG/M2 | HEIGHT: 69 IN | SYSTOLIC BLOOD PRESSURE: 112 MMHG | OXYGEN SATURATION: 97 %

## 2023-03-15 DIAGNOSIS — Z00.00 ROUTINE HISTORY AND PHYSICAL EXAMINATION OF ADULT: ICD-10-CM

## 2023-03-15 DIAGNOSIS — G43.109 MIGRAINE WITH AURA AND WITHOUT STATUS MIGRAINOSUS, NOT INTRACTABLE: Primary | ICD-10-CM

## 2023-03-15 DIAGNOSIS — M53.3 PAIN IN THE COCCYX: ICD-10-CM

## 2023-03-15 DIAGNOSIS — F41.9 ANXIETY: ICD-10-CM

## 2023-03-15 DIAGNOSIS — G44.209 TENSION HEADACHE: ICD-10-CM

## 2023-03-15 PROCEDURE — 99385 PREV VISIT NEW AGE 18-39: CPT | Performed by: NURSE PRACTITIONER

## 2023-03-15 RX ORDER — RIBOFLAVIN (VITAMIN B2) 400 MG
400 TABLET ORAL DAILY
Qty: 30 TABLET | Refills: 2 | Status: SHIPPED | OUTPATIENT
Start: 2023-03-15 | End: 2023-12-19

## 2023-03-15 NOTE — PROGRESS NOTES
"SUBJECTIVE:  Litzy Hatch is a 22 year old female with pmh of   Patient Active Problem List   Diagnosis     Small intestinal bacterial overgrowth     Hashimoto's thyroiditis     Acne vulgaris     Subclinical hypothyroidism     Anti-TPO antibodies present     IgA deficiency (H)     Iron deficiency anemia secondary to inadequate dietary iron intake     Lactating mother     Encounter for preventive care     who comes in for preventive care examination today.   She has the following concerns    Hx Hashimoto's--Mostly following with Endo Dr. Kahn. First symptom was bloating, nausea. Any time levothyroxine dose is increased has bad anxiety, heart palpitations, which eventually levels off after about 1 month.  Some anxiety and therapy, has used ativan or zofran with travel in the past. Had to use ativan twice with last levothyroxine dose adjustment. Levothyroxine 88 mcg 1.5 two days per week. Pre-pregnancy was taking 50 mcg/d.         Bad headaches, not sure if related to hormones. Has 1 y.o. girl (Mally) at home, had been blaming symptoms on hormones/pregnancy/breastfeeding. Just finished weaning breastfeeding over the last couple days.   Headaches occur daily, sometimes BID, can be hard to know if they start or stop. Today is a good day, pain rated 4/10, hasn't taken anything to treat. On \"bad days\" pain is 8-9/10. Has been dealing with headaches consistently for 8 weeks.   Usually had resolved overnight, or pinpoint a specific trigger (change in caffeine intake, or needing to eat, or change in sleep pattern).     Hx hormonal migraines around 26-32 weeks, but prior to that had never had migraines. Saw Dr. Vera in neurology, MRI and saw ophthalmology. Everything clear. Takes tylenol almost daily now, excedrin, sumatriptan (doesn't seem to work as well, tries to catch early if possible). Ibuprofen if maxes out on Tylenol.  Also using Magnesium 500-700 mg/d, Sodium-Potassium electrolyte powder. Blue light " blocking glasses. Sleeps 8 hr/night. 3 meals and 2 snacks per day, 60-75 g protein/d. Drinks a lot of water, 120-150 oz water/d. Beef liver, Vit E, probiotic.  Tried to see if caffeine correlation, can get more anxious with this.   Had seen chiropractor, doesn't really help.  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   Follow-up scheduled with Dr. Vera in Neurology in 2 weeks.       Menses:  No LMP recorded.  Menstrual cycles are regular and normal in flow, uses thermal method, and Simón monitor to check ovulation, estrogen rising earlier in cycle than it used to     PAP HX:   Last   Lab Results   Component Value Date    PAP NIL 11/04/2020     History of abnormal None    Breast:   Breast concerns No  No results found.    Sexual Hx:  Sexually active  yes, single partner, contraception - Natural family planning  Partner(s) are  male   IS NOT interested in STD testing    Pregnancy:   G  1 and P  1      Medications and allergies reviewed by me today.     Family History   Problem Relation Age of Onset     Allergies Mother         Nickel     Anxiety Disorder Mother      Attention Deficit Disorder Sister      Allergies Brother         Currently on AIT- noted 5/12/2021     Attention Deficit Disorder Brother      Hypothyroidism Maternal Grandmother      Asthma Maternal Grandmother      Thyroid Disease Maternal Grandmother      Breast Cancer Paternal Grandmother      Breast Cancer Maternal Great-Grandmother      Hypothyroidism Maternal Great-Grandmother      Hypothyroidism Maternal Aunt      Diabetes No family hx of      Glaucoma No family hx of      Macular Degeneration No family hx of        Social History     Tobacco Use     Smoking status: Never     Smokeless tobacco: Never   Substance Use Topics     Alcohol use: Not Currently     Comment: 1-2 X per  week     Drug use: Never       Immunization History   Administered Date(s) Administered     Comvax (HIB/HepB) 1998, 1998, 05/04/1999     DTAP (<7y) 1998, 1998, 1998, 05/04/1999, 05/05/2003     Flu, Unspecified 10/24/2020     HPV Quadrivalent 04/09/2012, 06/12/2012, 10/17/2012     HepA-ped 2 Dose 07/08/2013, 01/27/2014     Hepatitis B, Adult 05/04/2018     Influenza (IIV3) PF 10/19/2007, 11/13/2008, 10/06/2009, 10/23/2010     Influenza Vaccine >6 months (Alfuria,Fluzone) 10/27/2014, 09/21/2015, 10/25/2016, 10/05/2018, 09/07/2019     Influenza Vaccine IM Ages 6-35 Months 4 Valent (PF) 10/31/2011     Influenza,INJ,MDCK,PF,Quad >6mo(Flucelvax) 10/05/2018     MMR 08/03/1999, 05/05/2003     Meningococcal ACWY (Menveo ) 07/08/2013, 09/21/2015     Poliovirus, inactivated (IPV) 1998, 1998, 05/04/1999, 05/05/2003     TDAP Vaccine (Adacel) 06/07/2010, 08/13/2019     TDAP Vaccine (Boostrix) 12/01/2019     Tdap (Adacel,Boostrix) 12/01/2019     Typhoid Oral 09/15/2017     Yellow Fever, Live (Stamaril) 08/02/2019         Review Of Systems    Answers for HPI/ROS submitted by the patient on 3/8/2023  General Symptoms: No  Skin Symptoms: No  HENT Symptoms: No  EYE SYMPTOMS: No  HEART SYMPTOMS: No  LUNG SYMPTOMS: No  INTESTINAL SYMPTOMS: No  URINARY SYMPTOMS: No  GYNECOLOGIC SYMPTOMS: No  BREAST SYMPTOMS: No  SKELETAL SYMPTOMS: Yes  BLOOD SYMPTOMS: No  NERVOUS SYSTEM SYMPTOMS: Yes  MENTAL HEALTH SYMPTOMS: Yes  Back pain: No  Muscle aches: No  Neck pain: No  Swollen joints: No  Joint pain: Yes  Bone pain: No  Muscle cramps: No  Muscle weakness: No  Joint stiffness: No  Bone fracture: No  Trouble with coordination: No  Dizziness or trouble with balance: No  Fainting or black-out spells: No  Memory loss: No  Headache: Yes  Seizures: No  Speech problems: No  Tingling: No  Tremor: No  Weakness: No  Difficulty walking: No  Paralysis: No  Numbness: No  Nervous or Anxious: Yes  Depression: No  Trouble  "sleeping: Yes  Trouble thinking or concentrating: No  Mood changes: Yes  Panic attacks: Yes          OBJECTIVE:    /68 (BP Location: Right arm, Patient Position: Sitting, Cuff Size: Adult Regular)   Pulse 82   Ht 1.758 m (5' 9.21\")   Wt 71.8 kg (158 lb 3.2 oz)   SpO2 97%   Breastfeeding Yes   BMI 23.22 kg/m     Wt Readings from Last 1 Encounters:   03/15/23 71.8 kg (158 lb 3.2 oz)       Constitutional: no distress, comfortable, pleasant   Eyes: anicteric, normal extra-ocular movements   Ears, Nose and Throat: tympanic membranes clear, nose clear and free of lesions, throat clear, neck supple with full range of motion, no thyromegaly.   Cardiovascular: regular rate and rhythm, normal S1 and S2, no murmurs, rubs or gallops, peripheral pulses full and symmetric   Respiratory: clear to auscultation, no wheezes or crackles, normal breath sounds   Gastrointestinal: positive bowel sounds, nontender, no hepatosplenomegaly, no masses   Musculoskeletal: full range of motion, no edema   Skin: no concerning lesions, no jaundice   Neurological: cranial nerves intact, normal strength and sensation, reflexes at patella, normal gait, no tremor   Psychological: appropriate mood   Lymphatic: no cervical lymphadenopathy    ASSESSMENT/PLAN:  Pt is a 22 year old female here for preventive examination    1. Migraine with aura and without status migrainosus, not intractable  2. Tension headache  Concern for medication over-use headache with daily Tylenol use. Requested that she cut back on this, aim for no more than 14 days per month maximum. Start Riboflavin supplements and will also refer for acupuncture. Continue with regular sleep schedule, maintaining good hydration, regular meals, etc. Check basic labs today and keep upcoming follow-up appointment with Neurology as scheduled. She agrees with the plan.   - Riboflavin 400 MG TABS; Take 400 mg by mouth daily  Dispense: 30 tablet; Refill: 2  - Acupuncture Referral; Future  - " Comprehensive metabolic panel (BMP + Alb, Alk Phos, ALT, AST, Total. Bili, TP); Future  - CBC with platelets and differential; Future  - CRP, inflammation; Future  - Vitamin D Deficiency; Future  - ESR: Erythrocyte sedimentation rate; Future    3. Anxiety  Referral provided for establishing with therapy in the future.  - Adult Mental Health  Referral; Future    4. Pain in the coccyx  Would benefit from pelvic floor PT, referral provided.   - Physical Therapy Referral; Future    5. Routine history and physical examination of adult  Encouraged continue with healthy lifestyle; is up to date on health maintenance.     FOLLOW UP: Return in about 4 months (around 7/15/2023).    GYN TESTING:  Pap   No If normal PAP results pap every 3 years--due Nov 2024.  STD testing No     PREVENTATIVE TESTING:  Breast cancer screening  not indicated   Colorectal screening not indicated    Osteoporosis screening not indicated.    Immunizations reviewed    Labs ordered as above  Counseling was provided in the following areas:  healthy diet/nutrition  contraception  family planning    LISA Rodriguez CNP

## 2023-03-17 ENCOUNTER — LAB (OUTPATIENT)
Dept: LAB | Facility: CLINIC | Age: 23
End: 2023-03-17
Payer: COMMERCIAL

## 2023-03-17 DIAGNOSIS — G43.109 MIGRAINE WITH AURA AND WITHOUT STATUS MIGRAINOSUS, NOT INTRACTABLE: ICD-10-CM

## 2023-03-17 LAB
ALBUMIN SERPL BCG-MCNC: 4.6 G/DL (ref 3.5–5.2)
ALP SERPL-CCNC: 85 U/L (ref 35–104)
ALT SERPL W P-5'-P-CCNC: 24 U/L (ref 10–35)
ANION GAP SERPL CALCULATED.3IONS-SCNC: 12 MMOL/L (ref 7–15)
AST SERPL W P-5'-P-CCNC: 24 U/L (ref 10–35)
BASOPHILS # BLD AUTO: 0 10E3/UL (ref 0–0.2)
BASOPHILS NFR BLD AUTO: 1 %
BILIRUB SERPL-MCNC: 0.4 MG/DL
BUN SERPL-MCNC: 21.5 MG/DL (ref 6–20)
CALCIUM SERPL-MCNC: 9.5 MG/DL (ref 8.6–10)
CHLORIDE SERPL-SCNC: 103 MMOL/L (ref 98–107)
CREAT SERPL-MCNC: 0.55 MG/DL (ref 0.51–0.95)
CRP SERPL-MCNC: <3 MG/L
DEPRECATED HCO3 PLAS-SCNC: 21 MMOL/L (ref 22–29)
EOSINOPHIL # BLD AUTO: 0.2 10E3/UL (ref 0–0.7)
EOSINOPHIL NFR BLD AUTO: 3 %
ERYTHROCYTE [DISTWIDTH] IN BLOOD BY AUTOMATED COUNT: 13.1 % (ref 10–15)
ERYTHROCYTE [SEDIMENTATION RATE] IN BLOOD BY WESTERGREN METHOD: 12 MM/HR (ref 0–20)
GFR SERPL CREATININE-BSD FRML MDRD: >90 ML/MIN/1.73M2
GLUCOSE SERPL-MCNC: 85 MG/DL (ref 70–99)
HCT VFR BLD AUTO: 37.5 % (ref 35–47)
HGB BLD-MCNC: 12.4 G/DL (ref 11.7–15.7)
LYMPHOCYTES # BLD AUTO: 2.4 10E3/UL (ref 0.8–5.3)
LYMPHOCYTES NFR BLD AUTO: 38 %
MCH RBC QN AUTO: 28.4 PG (ref 26.5–33)
MCHC RBC AUTO-ENTMCNC: 33.1 G/DL (ref 31.5–36.5)
MCV RBC AUTO: 86 FL (ref 78–100)
MONOCYTES # BLD AUTO: 0.5 10E3/UL (ref 0–1.3)
MONOCYTES NFR BLD AUTO: 8 %
NEUTROPHILS # BLD AUTO: 3.3 10E3/UL (ref 1.6–8.3)
NEUTROPHILS NFR BLD AUTO: 52 %
PLATELET # BLD AUTO: 342 10E3/UL (ref 150–450)
POTASSIUM SERPL-SCNC: 4.3 MMOL/L (ref 3.4–5.3)
PROT SERPL-MCNC: 7.9 G/DL (ref 6.4–8.3)
RBC # BLD AUTO: 4.36 10E6/UL (ref 3.8–5.2)
SODIUM SERPL-SCNC: 136 MMOL/L (ref 136–145)
WBC # BLD AUTO: 6.4 10E3/UL (ref 4–11)

## 2023-03-17 PROCEDURE — 85652 RBC SED RATE AUTOMATED: CPT

## 2023-03-17 PROCEDURE — 82306 VITAMIN D 25 HYDROXY: CPT

## 2023-03-17 PROCEDURE — 85025 COMPLETE CBC W/AUTO DIFF WBC: CPT

## 2023-03-17 PROCEDURE — 86140 C-REACTIVE PROTEIN: CPT

## 2023-03-17 PROCEDURE — 80053 COMPREHEN METABOLIC PANEL: CPT

## 2023-03-17 PROCEDURE — 36415 COLL VENOUS BLD VENIPUNCTURE: CPT

## 2023-03-20 LAB — DEPRECATED CALCIDIOL+CALCIFEROL SERPL-MC: 42 UG/L (ref 20–75)

## 2023-03-21 ENCOUNTER — VIRTUAL VISIT (OUTPATIENT)
Dept: FAMILY MEDICINE | Facility: CLINIC | Age: 23
End: 2023-03-21
Payer: COMMERCIAL

## 2023-03-21 DIAGNOSIS — R53.83 OTHER FATIGUE: ICD-10-CM

## 2023-03-21 DIAGNOSIS — E03.8 SUBCLINICAL HYPOTHYROIDISM: Primary | ICD-10-CM

## 2023-03-21 PROCEDURE — 99213 OFFICE O/P EST LOW 20 MIN: CPT | Mod: VID | Performed by: FAMILY MEDICINE

## 2023-03-21 NOTE — PROGRESS NOTES
Litzy is a 22 year old who is being evaluated via a billable video visit.      How would you like to obtain your AVS? MyChart  If the video visit is dropped, the invitation should be resent by: Send to e-mail at: [email or web address]  Will anyone else be joining your video visit? No      Video-Visit Details    Type of service:  Video Visit     Originating Location (pt. Location): Home  Distant Location (provider location):  On-site  Platform used for Video Visit: [employer]    Problem List Items Addressed This Visit     Subclinical hypothyroidism - Primary    Relevant Orders    T3 total    T3 reverse    Thyroid peroxidase antibody   Other Visit Diagnoses     Other fatigue        Relevant Orders    Ferritin        Check additional labs then will discuss with patient.  I suspect her autoimmune thyroid disease is acting up post pregnancy.  Consider Whole 30 or AIP diet for a few weeks to reset.        Subjective   Litzy is a 22 year old who presents for the following health issues   Chief Complaint   Patient presents with     RECHECK     Follow up hypothyroidism and H/A    She is about 16 months post partum.  She is having headaches / constipation / dry skin / fatigue / hair loss.  Just started increase of her thyroid medicine to 100 mcg daily with extra 0.5 one day a week. She did stop breast feeding.  She has had some intermittent joint pain and got PT referral.    She has history of hypothyroidism due to Hashimoto's.  Felt was well controlled during her pregnancy.  She has been taking Synthroid daily and gradually increasing the dose from before pregnancy.  Then she developed bloating, some hair loss.  TSH was up to 5.5 so dose of Synthroid was increased.  With dose increase, she feels warm / more anxious.    She has had some bloating / nausea.  Periods are regular - monthly and moderately heavy bleeding.  No contraception- would like to conceive in future.    SH: Lives with  / child.  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  Eating gluten free diet.  Sleeping through night.    She is taking beef liver supplements / magnesium / fish oil / vitamin D / probiotic.  History of Present Illness       Hypothyroidism:     Since last visit, patient describes the following symptoms::  Anxiety, Constipation, Dry skin, Fatigue and Hair loss    Headaches:   Since the patient's last clinic visit, headaches are: worsened  The patient is getting headaches:  3-4x week  She is able to do normal daily activities when she has a migraine.  The patient is taking the following rescue/relief medications:  No rescue/relief medications, Ibuprofen (Advil, Motrin) and Tylenol   Patient states \"The relief is inconsistent\" from the rescue/relief medications.   The patient is taking the following medications to prevent migraines:  No medications to prevent migraines  In the past 4 weeks, the patient has gone to an Urgent Care or Emergency Room 0 times times due to headaches.    She eats 2-3 servings of fruits and vegetables daily.She consumes 0 sweetened beverage(s) daily.She exercises with enough effort to increase her heart rate 10 to 19 minutes per day.  She exercises with enough effort to increase her heart rate 3 or less days per week.   She is taking medications regularly.       Review of Systems         Objective           Vitals:  No vitals were obtained today due to virtual visit.    Physical Exam   GENERAL: Healthy, alert and no distress  EYES: Eyes grossly normal to inspection.  No discharge or erythema, or obvious scleral/conjunctival abnormalities.  RESP: No audible wheeze, cough, or visible cyanosis.  No visible retractions or increased work of breathing.    SKIN: Visible skin clear. No significant rash, abnormal pigmentation or lesions.  NEURO: Cranial nerves grossly intact.  Mentation and speech appropriate for age.  PSYCH: Mentation appears normal, affect normal/bright, judgement and insight intact, normal speech and " appearance well-groomed.    Brittani Chin MD

## 2023-03-27 ENCOUNTER — MYC MEDICAL ADVICE (OUTPATIENT)
Dept: INTERNAL MEDICINE | Facility: CLINIC | Age: 23
End: 2023-03-27

## 2023-03-27 ENCOUNTER — LAB (OUTPATIENT)
Dept: LAB | Facility: CLINIC | Age: 23
End: 2023-03-27
Payer: COMMERCIAL

## 2023-03-27 DIAGNOSIS — G43.109 MIGRAINE WITH AURA AND WITHOUT STATUS MIGRAINOSUS, NOT INTRACTABLE: Primary | ICD-10-CM

## 2023-03-27 DIAGNOSIS — E03.8 SUBCLINICAL HYPOTHYROIDISM: ICD-10-CM

## 2023-03-27 DIAGNOSIS — G44.209 TENSION HEADACHE: ICD-10-CM

## 2023-03-27 DIAGNOSIS — R53.83 OTHER FATIGUE: ICD-10-CM

## 2023-03-27 DIAGNOSIS — G43.E09 CHRONIC MIGRAINE WITH AURA: ICD-10-CM

## 2023-03-27 LAB
FERRITIN SERPL-MCNC: 24 NG/ML (ref 6–175)
T3 SERPL-MCNC: 102 NG/DL (ref 85–202)

## 2023-03-27 PROCEDURE — 84482 T3 REVERSE: CPT | Mod: 90

## 2023-03-27 PROCEDURE — 99000 SPECIMEN HANDLING OFFICE-LAB: CPT

## 2023-03-27 PROCEDURE — 84480 ASSAY TRIIODOTHYRONINE (T3): CPT

## 2023-03-27 PROCEDURE — 86376 MICROSOMAL ANTIBODY EACH: CPT

## 2023-03-27 PROCEDURE — 36415 COLL VENOUS BLD VENIPUNCTURE: CPT

## 2023-03-27 PROCEDURE — 82728 ASSAY OF FERRITIN: CPT

## 2023-03-27 ASSESSMENT — MIGRAINE DISABILITY ASSESSMENT (MIDAS)
HOW MANY DAYS WAS YOUR PRODUCTIVITY CUT IN HALF BECAUSE OF HEADACHES: 45
HOW OFTEN WERE SOCIAL ACTIVITIES MISSED DUE TO HEADACHES: 60
TOTAL SCORE: 265
HOW MANY DAYS IN THE PAST 3 MONTHS HAVE YOU HAD A HEADACHE: 75
ON A SCALE FROM 0-10 ON AVERAGE HOW PAINFUL WERE HEADACHES: 8
HOW MANY DAYS WAS HOUSEWORK PRODUCTIVITY CUT IN HALF DUE TO HEADACHES: 90
HOW MANY DAYS DID YOU MISS WORK OR SCHOOL BECAUSE OF HEADACHES: 10
HOW MANY DAYS DID YOU NOT DO HOUSEWORK BECAUSE OF HEADACHES: 60

## 2023-03-27 ASSESSMENT — HEADACHE IMPACT TEST (HIT 6)
HOW OFTEN HAVE YOU FELT TOO TIRED TO WORK BECAUSE OF YOUR HEADACHES: VERY OFTEN
HOW OFTEN DO HEADACHES LIMIT YOUR DAILY ACTIVITIES: VERY OFTEN
HOW OFTEN HAVE YOU FELT FED UP OR IRRITATED BECAUSE OF YOUR HEADACHES: ALWAYS
HOW OFTEN DID HEADACHS LIMIT CONCENTRATION ON WORK OR DAILY ACTIVITY: VERY OFTEN
WHEN YOU HAVE A HEADACHE HOW OFTEN DO YOU WISH YOU COULD LIE DOWN: ALWAYS
HIT6 TOTAL SCORE: 70
WHEN YOU HAVE HEADACHES HOW OFTEN IS THE PAIN SEVERE: VERY OFTEN

## 2023-03-28 LAB — THYROPEROXIDASE AB SERPL-ACNC: 228 IU/ML

## 2023-03-28 NOTE — TELEPHONE ENCOUNTER
[facility]  [address], [address]  [place] [address]  Phone: [phone]  Fax: [phone]      Acupuncture referral faxed via damarisfalyn Bowser CMA ([facility]) at 10:33 AM on 3/28/2023

## 2023-03-28 NOTE — TELEPHONE ENCOUNTER
Referral signed for acupuncture with Mell Brown at [facility] per pt request.  LISA Rodriguez CNP

## 2023-03-29 ENCOUNTER — VIRTUAL VISIT (OUTPATIENT)
Dept: NEUROLOGY | Facility: CLINIC | Age: 23
End: 2023-03-29
Payer: COMMERCIAL

## 2023-03-29 DIAGNOSIS — G43.109 MIGRAINE WITH AURA AND WITHOUT STATUS MIGRAINOSUS, NOT INTRACTABLE: Primary | ICD-10-CM

## 2023-03-29 PROCEDURE — 99215 OFFICE O/P EST HI 40 MIN: CPT | Mod: VID | Performed by: PSYCHIATRY & NEUROLOGY

## 2023-03-29 RX ORDER — SUMATRIPTAN 100 MG/1
100 TABLET, FILM COATED ORAL
Qty: 18 TABLET | Refills: 11 | Status: SHIPPED | OUTPATIENT
Start: 2023-03-29 | End: 2023-08-07

## 2023-03-29 RX ORDER — NAPROXEN 500 MG/1
500 TABLET ORAL 2 TIMES DAILY PRN
Qty: 28 TABLET | Refills: 11 | Status: SHIPPED | OUTPATIENT
Start: 2023-03-29 | End: 2023-12-19

## 2023-03-29 RX ORDER — ONDANSETRON 4 MG/1
4 TABLET, FILM COATED ORAL EVERY 6 HOURS PRN
Qty: 20 TABLET | Refills: 11 | Status: SHIPPED | OUTPATIENT
Start: 2023-03-29 | End: 2023-12-05

## 2023-03-29 NOTE — NURSING NOTE
Is the patient currently in the state of [place]? YES    Visit mode:VIDEO    If the visit is dropped, the patient can be reconnected by: TELEPHONE VISIT: Phone number: [phone]    Will anyone else be joining the visit? NO      How would you like to obtain your AVS? MyChart    Are changes needed to the allergy or medication list? NO    Reason for vist: follow-up

## 2023-03-29 NOTE — LETTER
3/29/2023       RE: Litzy Hatch  [address] [place] [address]     Dear Colleague,    Thank you for referring your patient, Litzy Hatch, to the [facility]. Please see a copy of my visit note below.    [facility]    Headache Neurology Progress Note  March 29, 2023    Subjective:    Litzy Hatch returns for follow up of episodic migraine with aura. I last saw her in 2021.    She started weaning breastfeeding in February 2023. With reduction in breastfeeding, headaches worsened. She has stopped breastfeeding 5 weeks ago.     Headaches occurred 20 days in January/February. She took Tylenol and NSAIDs, or Excedrin as needed.     She has 30/30 headaches days per month, with up to 20 out of 30 severe headache days per month lately.    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 She has her menstrual period. This headache was bilateral frontal and ramped up over 15 minutes or so. It rated 9/10. She had associated nausea and felt a little dizzy. No loss of vision.  This was the most severe attack she has had.    She tried drinking water, caffeine, sumatriptan. Sumatriptan worked after about 1.5 hours.     She is taking B2, magnesium 400-500 mg for headache prevention. She recalls having low iron in pregnancy.    Thyroid issues have also been an issue.    Her blood pressure runs low 80-90/50-60; she can get lightheaded with this.    Objective:    Vitals: LMP 02/16/2023   General: Cooperative, NAD  Neurologic:  Mental Status: Fully alert, attentive and oriented. Speech clear and fluent.   Cranial Nerves: Facial movements symmetric.   Motor: No abnormal movements.      Recent labs were reviewed.  Hgb is within normal limits.  Thyroid peroxidase antibody is elevated.    Assessment/Plan:   Litzy Hatch is a 22 year old woman who returns for follow-up  of migraine with visual aura.  Today, with her current headache frequency, her headaches are more consistent with chronic migraine.     We reviewed headache triggers, which may include drop in estrogen with discontinuation of breast-feeding, changes in her thyroid function.  I did not appreciate any red flags that would warrant further imaging today, but I do recommend that she get an updated ophthalmologic examination to evaluate for signs of papilledema.    We discussed possibly adding a preventative treatment to her current plan, to try to reduce headache frequency and severity further.  She would like to figure out what is going on with her thyroid labs first, and then let me know if a preventative is desired.  I think this is reasonable.    -Eye exam  -Follow up with her treating physician regarding thyroid labs  -Message if a CGRP inhibitor is needed    For acute treatment of migraine, I recommend the following:  -For acute treatment of mild headache, I recommend naproxen 500 mg twice daily as needed, not to exceed more than 14 days/month to avoid medication overuse.  -For acute treatment of moderate to severe headache, I recommend sumatriptan 100 mg taken at the onset of headache, with a repeat dose in 2 hours if needed.  This should not exceed more than 9 days/month to avoid medication overuse.  -For headache related nausea, I recommend ondansetron 4 mg tablet every 6 hours as needed.     If her headache frequency and severity continue with the current pattern, we discussed several preventative strategies, including:  Magnesium 400 mg daily or BID  Riboflavin 200-400 mg daily   -Addition of a CGRP inhibitor, such as Aimovig, Emgality, or Ajovy.  Other options were considered, such as antiseizure medications, antitricyclic depressants, or antihypertensive medications, but with her shift work in the ICU, are not advised due to possibility of sedation or lowering blood pressure with her low baseline blood  pressure.  -Botulinum toxin injections could be considered in the future, if her headaches continue at the current frequency, meeting criteria for chronic migraine.    I will plan to follow-up with her 3 to 6 months after starting a preventative treatment, if this is needed.  I spent 40 minutes on record review, patient care, documentation today.    Liliya Vera MD  Neurology

## 2023-03-29 NOTE — PROGRESS NOTES
Virtual Visit Details    Type of service:  Video Visit   Video time: 9:07am - 9:35am    Originating Location (pt. Location): Home    Distant Location (provider location):  Off-site  Platform used for Video Visit: [facility]    Headache Neurology Progress Note  March 29, 2023    Subjective:    Litzy Hatch returns for follow up of episodic migraine with aura. I last saw her in 2021.    She started weaning breastfeeding in February 2023. With reduction in breastfeeding, headaches worsened. She has stopped breastfeeding 5 weeks ago.     Headaches occurred 20 days in January/February. She took Tylenol and NSAIDs, or Excedrin as needed.     She has 30/30 headaches days per month, with up to 20 out of 30 severe headache days per month lately.    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 She has her menstrual period. This headache was bilateral frontal and ramped up over 15 minutes or so. It rated 9/10. She had associated nausea and felt a little dizzy. No loss of vision.  This was the most severe attack she has had.    She tried drinking water, caffeine, sumatriptan. Sumatriptan worked after about 1.5 hours.     She is taking B2, magnesium 400-500 mg for headache prevention. She recalls having low iron in pregnancy.    Thyroid issues have also been an issue.    Her blood pressure runs low 80-90/50-60; she can get lightheaded with this.    Objective:    Vitals: LMP 02/16/2023   General: Cooperative, NAD  Neurologic:  Mental Status: Fully alert, attentive and oriented. Speech clear and fluent.   Cranial Nerves: Facial movements symmetric.   Motor: No abnormal movements.      Recent labs were reviewed.  Hgb is within normal limits.  Thyroid peroxidase antibody is elevated.    Assessment/Plan:   Litzy Hatch is a 22 year old woman who returns for follow-up of migraine with visual aura.  Today, with her current headache frequency, her headaches are  more consistent with chronic migraine.     We reviewed headache triggers, which may include drop in estrogen with discontinuation of breast-feeding, changes in her thyroid function.  I did not appreciate any red flags that would warrant further imaging today, but I do recommend that she get an updated ophthalmologic examination to evaluate for signs of papilledema.    We discussed possibly adding a preventative treatment to her current plan, to try to reduce headache frequency and severity further.  She would like to figure out what is going on with her thyroid labs first, and then let me know if a preventative is desired.  I think this is reasonable.    -Eye exam  -Follow up with her treating physician regarding thyroid labs  -Message if a CGRP inhibitor is needed    For acute treatment of migraine, I recommend the following:  -For acute treatment of mild headache, I recommend naproxen 500 mg twice daily as needed, not to exceed more than 14 days/month to avoid medication overuse.  -For acute treatment of moderate to severe headache, I recommend sumatriptan 100 mg taken at the onset of headache, with a repeat dose in 2 hours if needed.  This should not exceed more than 9 days/month to avoid medication overuse.  -For headache related nausea, I recommend ondansetron 4 mg tablet every 6 hours as needed.     If her headache frequency and severity continue with the current pattern, we discussed several preventative strategies, including:  Magnesium 400 mg daily or BID  Riboflavin 200-400 mg daily   -Addition of a CGRP inhibitor, such as Aimovig, Emgality, or Ajovy.  Other options were considered, such as antiseizure medications, antitricyclic depressants, or antihypertensive medications, but with her shift work in the ICU, are not advised due to possibility of sedation or lowering blood pressure with her low baseline blood pressure.  -Botulinum toxin injections could be considered in the future, if her headaches  continue at the current frequency, meeting criteria for chronic migraine.    I will plan to follow-up with her 3 to 6 months after starting a preventative treatment, if this is needed.  I spent 40 minutes on record review, patient care, documentation today.    Liliya Vera MD  Neurology

## 2023-03-30 LAB — T3REVERSE SERPL-MCNC: 11.1 NG/DL

## 2023-03-31 ENCOUNTER — MYC MEDICAL ADVICE (OUTPATIENT)
Dept: INTERNAL MEDICINE | Facility: CLINIC | Age: 23
End: 2023-03-31
Payer: COMMERCIAL

## 2023-03-31 DIAGNOSIS — G43.E09 CHRONIC MIGRAINE WITH AURA: ICD-10-CM

## 2023-03-31 DIAGNOSIS — G44.209 TENSION HEADACHE: ICD-10-CM

## 2023-03-31 DIAGNOSIS — R21 RASH: ICD-10-CM

## 2023-03-31 DIAGNOSIS — L70.0 ACNE VULGARIS: Primary | ICD-10-CM

## 2023-04-04 NOTE — TELEPHONE ENCOUNTER
[facility]   [address], [address]   [place] [address]   Phone: [phone]   Fax: [phone]       [facility]  Address  [address], [address] [place] [id number],     Contact Information  Phone: [phone]  [email or web address]

## 2023-04-06 NOTE — TELEPHONE ENCOUNTER
New chiropractic referral sent to: [email or web address].    Dontae FLOR, EMT at 2:13 PM on 4/6/2023.  Primary Care Clinic: [phone]        [facility]  Address  [address] [place] [address],      Contact Information  Phone: [phone]  [email or web address]

## 2023-05-08 ENCOUNTER — TELEPHONE (OUTPATIENT)
Dept: LAB | Facility: CLINIC | Age: 23
End: 2023-05-08
Payer: COMMERCIAL

## 2023-05-08 DIAGNOSIS — E06.3 HASHIMOTO'S THYROIDITIS: Primary | ICD-10-CM

## 2023-05-08 NOTE — PROGRESS NOTES
Patient is on our lab schedule 5/11/23 for Thyroid labs and there are no orders in her chart.  Please review and place future orders as needed.  Thanks!

## 2023-05-11 ENCOUNTER — LAB (OUTPATIENT)
Dept: LAB | Facility: CLINIC | Age: 23
End: 2023-05-11
Payer: COMMERCIAL

## 2023-05-11 DIAGNOSIS — E06.3 HASHIMOTO'S THYROIDITIS: ICD-10-CM

## 2023-05-11 PROCEDURE — 84443 ASSAY THYROID STIM HORMONE: CPT

## 2023-05-11 PROCEDURE — 36415 COLL VENOUS BLD VENIPUNCTURE: CPT

## 2023-05-11 PROCEDURE — 84439 ASSAY OF FREE THYROXINE: CPT

## 2023-05-11 PROCEDURE — 84481 FREE ASSAY (FT-3): CPT

## 2023-05-12 LAB
T3FREE SERPL-MCNC: 2.9 PG/ML (ref 2–4.4)
T4 FREE SERPL-MCNC: 1.42 NG/DL (ref 0.9–1.7)
TSH SERPL DL<=0.005 MIU/L-ACNC: 1.93 UIU/ML (ref 0.3–4.2)

## 2023-05-16 ENCOUNTER — APPOINTMENT (OUTPATIENT)
Dept: URBAN - METROPOLITAN AREA CLINIC 260 | Age: 23
Setting detail: DERMATOLOGY
End: 2023-05-16

## 2023-05-16 VITALS — WEIGHT: 150 LBS | HEIGHT: 69 IN

## 2023-05-16 DIAGNOSIS — Z71.89 OTHER SPECIFIED COUNSELING: ICD-10-CM

## 2023-05-16 DIAGNOSIS — D49.2 NEOPLASM OF UNSPECIFIED BEHAVIOR OF BONE, SOFT TISSUE, AND SKIN: ICD-10-CM

## 2023-05-16 DIAGNOSIS — L91.8 OTHER HYPERTROPHIC DISORDERS OF THE SKIN: ICD-10-CM

## 2023-05-16 DIAGNOSIS — L81.4 OTHER MELANIN HYPERPIGMENTATION: ICD-10-CM

## 2023-05-16 DIAGNOSIS — L70.0 ACNE VULGARIS: ICD-10-CM

## 2023-05-16 DIAGNOSIS — D22 MELANOCYTIC NEVI: ICD-10-CM

## 2023-05-16 PROBLEM — D22.5 MELANOCYTIC NEVI OF TRUNK: Status: ACTIVE | Noted: 2023-05-16

## 2023-05-16 PROBLEM — D22.4 MELANOCYTIC NEVI OF SCALP AND NECK: Status: ACTIVE | Noted: 2023-05-16

## 2023-05-16 PROCEDURE — OTHER BIOPSY BY SHAVE METHOD: OTHER

## 2023-05-16 PROCEDURE — OTHER PHOTO-DOCUMENTATION: OTHER

## 2023-05-16 PROCEDURE — 11102 TANGNTL BX SKIN SINGLE LES: CPT

## 2023-05-16 PROCEDURE — OTHER EDUCATIONAL RESOURCES PROVIDED: OTHER

## 2023-05-16 PROCEDURE — OTHER MIPS QUALITY: OTHER

## 2023-05-16 PROCEDURE — OTHER PRESCRIPTION MEDICATION MANAGEMENT: OTHER

## 2023-05-16 PROCEDURE — OTHER ADDITIONAL NOTES: OTHER

## 2023-05-16 PROCEDURE — OTHER COUNSELING: OTHER

## 2023-05-16 PROCEDURE — 99213 OFFICE O/P EST LOW 20 MIN: CPT | Mod: 25

## 2023-05-16 PROCEDURE — OTHER PRESCRIPTION: OTHER

## 2023-05-16 RX ORDER — TRETIONIN 0.5 MG/G
0.05% CREAM TOPICAL QHS
Qty: 45 | Refills: 2 | Status: ERX

## 2023-05-16 RX ORDER — TRETIONIN 0.25 MG/G
CREAM TOPICAL QHS
Qty: 45 | Refills: 6 | Status: ERX | COMMUNITY
Start: 2023-05-16

## 2023-05-16 RX ORDER — AZELAIC ACID 0.15 G/G
15% GEL TOPICAL BID
Qty: 50 | Refills: 3 | Status: ERX

## 2023-05-16 ASSESSMENT — LOCATION ZONE DERM
LOCATION ZONE: SCALP
LOCATION ZONE: TRUNK
LOCATION ZONE: FACE
LOCATION ZONE: AXILLAE

## 2023-05-16 ASSESSMENT — LOCATION SIMPLE DESCRIPTION DERM
LOCATION SIMPLE: LEFT CHEEK
LOCATION SIMPLE: UPPER BACK
LOCATION SIMPLE: RIGHT AXILLARY VAULT
LOCATION SIMPLE: RIGHT CHEEK
LOCATION SIMPLE: SCALP
LOCATION SIMPLE: CHEST

## 2023-05-16 ASSESSMENT — LOCATION DETAILED DESCRIPTION DERM
LOCATION DETAILED: RIGHT CENTRAL PARIETAL SCALP
LOCATION DETAILED: RIGHT LATERAL SUPERIOR CHEST
LOCATION DETAILED: LEFT INFERIOR CENTRAL MALAR CHEEK
LOCATION DETAILED: RIGHT AXILLARY VAULT
LOCATION DETAILED: INFERIOR THORACIC SPINE
LOCATION DETAILED: RIGHT INFERIOR CENTRAL MALAR CHEEK

## 2023-05-16 NOTE — PROCEDURE: PRESCRIPTION MEDICATION MANAGEMENT
Plan: Recheck in a year for refills
Render In Strict Bullet Format?: No
Detail Level: Simple
Continue Regimen: Azelaic Acid 15% gel BID, Tretinoin 0.05% cream QHS
Initiate Treatment: Patient expressed some irritation with the 0.05 strength Tretinoin in the summer so we will also send 0.025 and she can decide which one she wants to fill.

## 2023-05-16 NOTE — HPI: FULL BODY SKIN EXAMINATION
How Severe Are Your Spot(S)?: moderate
What Type Of Note Output Would You Prefer (Optional)?: Standard Output
What Is The Reason For Today's Visit?: Full Body Skin Examination
What Is The Reason For Today's Visit? (Being Monitored For X): concerning skin lesions on an annual basis
Additional History: Patient reports there is an area of concern in her armpit that she thinks is a skin tag

## 2023-05-16 NOTE — PROCEDURE: COUNSELING
Detail Level: Detailed
High Dose Vitamin A Pregnancy And Lactation Text: High dose vitamin A therapy is contraindicated during pregnancy and breast feeding.
Erythromycin Pregnancy And Lactation Text: This medication is Pregnancy Category B and is considered safe during pregnancy. It is also excreted in breast milk.
Isotretinoin Pregnancy And Lactation Text: This medication is Pregnancy Category X and is considered extremely dangerous during pregnancy. It is unknown if it is excreted in breast milk.
Doxycycline Counseling:  Patient counseled regarding possible photosensitivity and increased risk for sunburn.  Patient instructed to avoid sunlight, if possible.  When exposed to sunlight, patients should wear protective clothing, sunglasses, and sunscreen.  The patient was instructed to call the office immediately if the following severe adverse effects occur:  hearing changes, easy bruising/bleeding, severe headache, or vision changes.  The patient verbalized understanding of the proper use and possible adverse effects of doxycycline.  All of the patient's questions and concerns were addressed.
Benzoyl Peroxide Counseling: Patient counseled that medicine may cause skin irritation and bleach clothing.  In the event of skin irritation, the patient was advised to reduce the amount of the drug applied or use it less frequently.   The patient verbalized understanding of the proper use and possible adverse effects of benzoyl peroxide.  All of the patient's questions and concerns were addressed.
Topical Retinoid counseling:  Patient advised to apply a pea-sized amount only at bedtime and wait 30 minutes after washing their face before applying.  If too drying, patient may add a non-comedogenic moisturizer. The patient verbalized understanding of the proper use and possible adverse effects of retinoids.  All of the patient's questions and concerns were addressed.
Tazorac Counseling:  Patient advised that medication is irritating and drying.  Patient may need to apply sparingly and wash off after an hour before eventually leaving it on overnight.  The patient verbalized understanding of the proper use and possible adverse effects of tazorac.  All of the patient's questions and concerns were addressed.
Tetracycline Pregnancy And Lactation Text: This medication is Pregnancy Category D and not consider safe during pregnancy. It is also excreted in breast milk.
Winlevi Counseling:  I discussed with the patient the risks of topical clascoterone including but not limited to erythema, scaling, itching, and stinging. Patient voiced their understanding.
Patient Specific Counseling (Will Not Stick From Patient To Patient): Discussed skin care products with patient today. Hyloronic and Glycolic acid.
Detail Level: Zone
Isotretinoin Counseling: Patient should get monthly blood tests, not donate blood, not drive at night if vision affected, not share medication, and not undergo elective surgery for 6 months after tx completed. Side effects reviewed, pt to contact office should one occur.
Dapsone Counseling: I discussed with the patient the risks of dapsone including but not limited to hemolytic anemia, agranulocytosis, rashes, methemoglobinemia, kidney failure, peripheral neuropathy, headaches, GI upset, and liver toxicity.  Patients who start dapsone require monitoring including baseline LFTs and weekly CBCs for the first month, then every month thereafter.  The patient verbalized understanding of the proper use and possible adverse effects of dapsone.  All of the patient's questions and concerns were addressed.
Winlevi Pregnancy And Lactation Text: This medication is considered safe during pregnancy and breastfeeding.
Detail Level: Generalized
Doxycycline Pregnancy And Lactation Text: This medication is Pregnancy Category D and not consider safe during pregnancy. It is also excreted in breast milk but is considered safe for shorter treatment courses.
Birth Control Pills Counseling: Birth Control Pill Counseling: I discussed with the patient the potential side effects of OCPs including but not limited to increased risk of stroke, heart attack, thrombophlebitis, deep venous thrombosis, hepatic adenomas, breast changes, GI upset, headaches, and depression.  The patient verbalized understanding of the proper use and possible adverse effects of OCPs. All of the patient's questions and concerns were addressed.
Dapsone Pregnancy And Lactation Text: This medication is Pregnancy Category C and is not considered safe during pregnancy or breast feeding.
Topical Sulfur Applications Counseling: Topical Sulfur Counseling: Patient counseled that this medication may cause skin irritation or allergic reactions.  In the event of skin irritation, the patient was advised to reduce the amount of the drug applied or use it less frequently.   The patient verbalized understanding of the proper use and possible adverse effects of topical sulfur application.  All of the patient's questions and concerns were addressed.
Topical Clindamycin Pregnancy And Lactation Text: This medication is Pregnancy Category B and is considered safe during pregnancy. It is unknown if it is excreted in breast milk.
Tetracycline Counseling: Patient counseled regarding possible photosensitivity and increased risk for sunburn.  Patient instructed to avoid sunlight, if possible.  When exposed to sunlight, patients should wear protective clothing, sunglasses, and sunscreen.  The patient was instructed to call the office immediately if the following severe adverse effects occur:  hearing changes, easy bruising/bleeding, severe headache, or vision changes.  The patient verbalized understanding of the proper use and possible adverse effects of tetracycline.  All of the patient's questions and concerns were addressed. Patient understands to avoid pregnancy while on therapy due to potential birth defects.
Azelaic Acid Counseling: Patient counseled that medicine may cause skin irritation and to avoid applying near the eyes.  In the event of skin irritation, the patient was advised to reduce the amount of the drug applied or use it less frequently.   The patient verbalized understanding of the proper use and possible adverse effects of azelaic acid.  All of the patient's questions and concerns were addressed.
Minocycline Counseling: Patient advised regarding possible photosensitivity and discoloration of the teeth, skin, lips, tongue and gums.  Patient instructed to avoid sunlight, if possible.  When exposed to sunlight, patients should wear protective clothing, sunglasses, and sunscreen.  The patient was instructed to call the office immediately if the following severe adverse effects occur:  hearing changes, easy bruising/bleeding, severe headache, or vision changes.  The patient verbalized understanding of the proper use and possible adverse effects of minocycline.  All of the patient's questions and concerns were addressed.
Use Enhanced Medication Counseling?: No
Spironolactone Counseling: Patient advised regarding risks of diarrhea, abdominal pain, hyperkalemia, birth defects (for female patients), liver toxicity and renal toxicity. The patient may need blood work to monitor liver and kidney function and potassium levels while on therapy. The patient verbalized understanding of the proper use and possible adverse effects of spironolactone.  All of the patient's questions and concerns were addressed.
Benzoyl Peroxide Pregnancy And Lactation Text: This medication is Pregnancy Category C. It is unknown if benzoyl peroxide is excreted in breast milk.
Birth Control Pills Pregnancy And Lactation Text: This medication should be avoided if pregnant and for the first 30 days post-partum.
Azithromycin Pregnancy And Lactation Text: This medication is considered safe during pregnancy and is also secreted in breast milk.
Topical Retinoid Pregnancy And Lactation Text: This medication is Pregnancy Category C. It is unknown if this medication is excreted in breast milk.
Tazorac Pregnancy And Lactation Text: This medication is not safe during pregnancy. It is unknown if this medication is excreted in breast milk.
Bactrim Pregnancy And Lactation Text: This medication is Pregnancy Category D and is known to cause fetal risk.  It is also excreted in breast milk.
Sarecycline Counseling: Patient advised regarding possible photosensitivity and discoloration of the teeth, skin, lips, tongue and gums.  Patient instructed to avoid sunlight, if possible.  When exposed to sunlight, patients should wear protective clothing, sunglasses, and sunscreen.  The patient was instructed to call the office immediately if the following severe adverse effects occur:  hearing changes, easy bruising/bleeding, severe headache, or vision changes.  The patient verbalized understanding of the proper use and possible adverse effects of sarecycline.  All of the patient's questions and concerns were addressed.
Azelaic Acid Pregnancy And Lactation Text: This medication is considered safe during pregnancy and breast feeding.
Topical Sulfur Applications Pregnancy And Lactation Text: This medication is Pregnancy Category C and has an unknown safety profile during pregnancy. It is unknown if this topical medication is excreted in breast milk.
Bactrim Counseling:  I discussed with the patient the risks of sulfa antibiotics including but not limited to GI upset, allergic reaction, drug rash, diarrhea, dizziness, photosensitivity, and yeast infections.  Rarely, more serious reactions can occur including but not limited to aplastic anemia, agranulocytosis, methemoglobinemia, blood dyscrasias, liver or kidney failure, lung infiltrates or desquamative/blistering drug rashes.
Spironolactone Pregnancy And Lactation Text: This medication can cause feminization of the male fetus and should be avoided during pregnancy. The active metabolite is also found in breast milk.
Erythromycin Counseling:  I discussed with the patient the risks of erythromycin including but not limited to GI upset, allergic reaction, drug rash, diarrhea, increase in liver enzymes, and yeast infections.
High Dose Vitamin A Counseling: Side effects reviewed, pt to contact office should one occur.
Azithromycin Counseling:  I discussed with the patient the risks of azithromycin including but not limited to GI upset, allergic reaction, drug rash, diarrhea, and yeast infections.
Topical Clindamycin Counseling: Patient counseled that this medication may cause skin irritation or allergic reactions.  In the event of skin irritation, the patient was advised to reduce the amount of the drug applied or use it less frequently.   The patient verbalized understanding of the proper use and possible adverse effects of clindamycin.  All of the patient's questions and concerns were addressed.

## 2023-05-16 NOTE — PROCEDURE: BIOPSY BY SHAVE METHOD
Render Path Notes In Note?: Yes
Wound Care: Vaseline
Silver Nitrate Text: The wound bed was treated with silver nitrate after the biopsy was performed.
Hide Additional Anticipated Plan?: No
Curettage Text: The wound bed was treated with curettage after the biopsy was performed.
Dressing: Band-Aid
Information: Selecting Yes will display possible errors in your note based on the variables you have selected. This validation is only offered as a suggestion for you. PLEASE NOTE THAT THE VALIDATION TEXT WILL BE REMOVED WHEN YOU FINALIZE YOUR NOTE. IF YOU WANT TO FAX A PRELIMINARY NOTE YOU WILL NEED TO TOGGLE THIS TO 'NO' IF YOU DO NOT WANT IT IN YOUR FAXED NOTE.
Consent: Written consent was obtained and risks were reviewed including but not limited to scarring, infection, bleeding, scabbing, incomplete removal, nerve damage and allergy to anesthesia.
Biopsy Method: double edge Personna blade
Notification Instructions: Patient will be notified of biopsy results. However, patient instructed to call the office if not contacted within 2 weeks.
Electrodesiccation And Curettage Text: The wound bed was treated with electrodesiccation and curettage after the biopsy was performed.
Anesthesia Volume In Cc (Will Not Render If 0): 0
Post-Care Instructions: I reviewed with the patient in detail post-care instructions. Patient is to keep the biopsy site dry overnight, and then apply vaseline and clean bandage daily.
Path Notes (To The Dermatopathologist): Please check margins
Detail Level: Detailed
Electrodesiccation Text: The wound bed was treated with electrodesiccation after the biopsy was performed.
Hemostasis: Drysol
Billing Type: Third-Party Bill
Biopsy Type: H and E
Type Of Destruction Used: Curettage
Cryotherapy Text: The wound bed was treated with cryotherapy after the biopsy was performed.
Depth Of Biopsy: dermis

## 2023-05-16 NOTE — PROCEDURE: ADDITIONAL NOTES
Additional Notes: Patient thinks this may have fallen off over night
Detail Level: Simple
Render Risk Assessment In Note?: no

## 2023-06-12 ENCOUNTER — THERAPY VISIT (OUTPATIENT)
Dept: PHYSICAL THERAPY | Facility: CLINIC | Age: 23
End: 2023-06-12
Attending: NURSE PRACTITIONER
Payer: COMMERCIAL

## 2023-06-12 DIAGNOSIS — M53.3 PAIN IN THE COCCYX: ICD-10-CM

## 2023-06-12 DIAGNOSIS — N39.3 SUI (STRESS URINARY INCONTINENCE, FEMALE): ICD-10-CM

## 2023-06-12 DIAGNOSIS — M62.89 PELVIC FLOOR DYSFUNCTION: ICD-10-CM

## 2023-06-12 PROCEDURE — 97112 NEUROMUSCULAR REEDUCATION: CPT | Mod: GP | Performed by: PHYSICAL THERAPIST

## 2023-06-12 PROCEDURE — 97161 PT EVAL LOW COMPLEX 20 MIN: CPT | Mod: GP | Performed by: PHYSICAL THERAPIST

## 2023-06-12 NOTE — PROGRESS NOTES
PHYSICAL THERAPY EVALUATION  Type of Visit: Evaluation    See electronic medical record for Abuse and Falls Screening details.    Subjective      Presenting condition or subjective complaint: Tail bone pain     Complains of tailbone pain beginning around 6 months of pregnancy (child currently 16 months). More recently the pain has improved since stopping breast feeding. Notes pain while sitting and reclined (15 min) and when transferring from sit to stand. Reports temporary improvement with chiropractic; however, no specific coxyc adjustments. Also complains of stress urinary incontinence beginning during pregnancy, this too has improved since stopping breastfeeding. States this was primarily an issue when sneezing. Planning on eventually trying for pregnancy number 2, concerned these issues will present again.     No history of abdominal surgery, unremarkable pregnancy (other then tailbone, round ligament and R hip) with quick labor, reports tearing from introitus to R labia minora.    Date of onset: 03/15/23 (MD order)    Relevant medical history: Migraines or headaches; Severe headaches; Thyroid problems   Dates & types of surgery:      Prior diagnostic imaging/testing results:       Prior therapy history for the same diagnosis, illness or injury: Yes Chiropractic care    Living Environment  Social support: With a significant other or spouse   Type of home: [facility]   Stairs to enter the home: No       Ramp: No   Stairs inside the home: Yes 30 Is there a railing: Yes   Help at home: None  Equipment owned:       Employment: Yes RN  Hobbies/Interests: Biking    Patient goals for therapy: Recline in a rocking chair    Pain assessment: Pain denied     Objective      PELVIC EVALUATION  ADDITIONAL HISTORY:  Sex assigned at birth: Female  Gender identity: Female    Pronouns: She/Her Hers      Bladder History:  Feels bladder filling: No  Triggers for feeling of inability to wait to go to the bathroom: Yes Pumping  How  long can you wait to urinate:    Gets up at night to urinate: No    Can stop the flow of urine when urinating: Yes  Volume of urine usually released: Average   Other issues:    Number of bladder infections in last 12 months:    Fluid intake per day: 120      Medications taken for bladder: No     Activities causing urine leak: Sneeze    Amount of urine typically leaked: Drops  Pads used to help with leaking: No        Bowel History:  Frequency of bowel movement: Daily  Consistency of stool: Soft-formed    Ignores the urge to defecate: Sometimes  Other bowel issues: Straining to have bowel movement  Length of time spent trying to have a bowel movement: 3-5 min    Sexual Function History:  Sexual orientation: Straight    Sexually active: Yes  Lubrication used: No No  Pelvic pain: Sitting; Certain positions; Deep penetration (rectal or vaginal) Reclining  Pain or difficulty with orgasms/erection/ejaculation: No    State of menopause: Perimenopause (have not gone through menopause yet)  Hormone medications: No      Are you currently pregnant: No, Number of previous pregnancies: 1, Number of deliveries: 1, If you have delivered before, did you have any of these issues during delivery: Vaginal delivery, Have you been diagnosed with pelvic prolapse or abdominal separation: No, Do you get regular exercise: Yes, I do this type of exercise: Biking, walking, volleyball, Have you tried pelvic floor strengthening exercises for 4 weeks: No, Do you have any history of trauma that is relevant to your care that you d like to share: No    Discussed reason for referral regarding pelvic health needs and external/internal pelvic floor muscle examination with patient/guardian.  Opportunity provided to ask questions and verbal consent for assessment and intervention was given.    PAIN: Pain Level at Rest: 0/10  Pain Level with Use: 9/10  Pain Location: tailbone  Pain Quality: sore, stiff, achy  Pain Progression: Improved  POSTURE: Standing  Posture: Rounded shoulders, Forward head, Lordosis decreased  Sitting Posture: Rounded shoulders, Forward head, Lordosis decreased  LUMBAR SCREEN:   (Degrees) Left AROM Left PROM  Right AROM Right PROM   Hip Flexion  wfl  wfl   Hip Extension       Hip Abduction       Hip Adduction       Hip Internal Rotation  wfl  wfl   Hip External Rotation  wfl  wfl   Knee Flexion       Knee Extension       Lumbar Side glide     Lumbar Flexion Mid shins   Lumbar Extension 75%   Pain:   End feel:     HIP SCREEN:  Strength:   Pain: - none + mild ++ moderate +++ severe  Strength Scale: 0-5/5 Left Right   Hip Flexion 4+ 4+   Hip Extension 4+ 4+   Hip Abduction 4 4   Hip Adduction     Hip Internal Rotation 5 5   Hip External Rotation 5- 5-   Knee Flexion 5 5   Knee Extension 5 5      Functional Strength Testing: Standing forward flexion PSIS: +R    PELVIC/SI SCREEN:  Standing Forward Bend: possible +R, Nicole (March): +R   PAIN PROVOCATION TEST:    Left Right   REINALDO Negative  Positive   FADIR/Labrum/AMERICA Positive Positive   Femoral Nerve Negative  Negative    Ze's     Piriformis     Quadrant Testing     SLR Negative  Negative    Slump     Stork with Extension     Urbano            PELVIS/SI SPECIAL TESTS:    Left Right Additional Notes   ASLR + +    Gaenslen's Test      Pelvic Compression      Pelvic Gapping      Sacral Thrust      Thigh Thrust        SIJ Palpatory    Standing forward bend (first/farthest superior movement, note side) Right   Sitting forward bend (first/farthest superior movement, note side)    Pubic symphysis Superior:  Inferior:      BREATHING SYMMETRY:     PELVIC EXAM  External Visual Inspection:  At rest: Normal  With voluntary pelvic floor contraction: Perineal elevation  Relaxation of PFM: Partial/delayed relaxation  With intra-abdominal pressure: Cough: Perineal descent    Integumentary:   Introitus: fullness noted through R pelvic clock 6-9    External Digital Palpation per Perineum:   obterator internus  tender B    Scar:   Location/Type: no scar identified; however, fullness noted between introitus and R inferior labia minor pelvic  Mobility: WNL    Internal Digital Palpation:  Per Vagina:  Tone: normal  Digital Muscle Performance: P (Power): 3  E (Endurance): 10  R (Repetitions): 2  F (Fast Twitch): unable to perform quick contractions, difficulty relaxing between reps  Compensations: Abdominals, Gluts, Breath holding  Relaxation Post-Contraction: Partial/delayed relaxation        Pelvic Organ Prolapse:       ABDOMINAL ASSESSMENT  Diastasis Rectus Abdominis (MIROSLAVA):   none    Abdominal Activation/Strength: TrA Contraction: poor compensates w/ Rectus    Scar:   Location/Type: n/a  Mobility:     Fascial Tension/Restriction/Tone:       DERMATOMES:   DTR S:     Assessment & Plan   CLINICAL IMPRESSIONS   Medical Diagnosis: Pain in the coccyx    Treatment Diagnosis: Pain in the coccyx, stress UI, pelvic floor dysfunction   Impression/Assessment: Patient is a 23 year old female with tailbone and stress UI complaints.  The following significant findings have been identified: Pain, Decreased ROM/flexibility, Decreased joint mobility, Decreased strength, Impaired muscle performance, Decreased activity tolerance and Impaired posture. These impairments interfere with their ability to perform self care tasks, work tasks, recreational activities, household chores, driving , household mobility and community mobility as compared to previous level of function.     Clinical Decision Making (Complexity):   Clinical Presentation: Stable/Uncomplicated  Clinical Presentation Rationale: based on medical and personal factors listed in PT evaluation  Clinical Decision Making (Complexity): Low complexity    PLAN OF CARE  Treatment Interventions:  Modalities: Biofeedback  Interventions: Manual Therapy, Neuromuscular Re-education, Therapeutic Activity, Therapeutic Exercise, Self-Care/Home Management    Long Term Goals     PT Goal 1  Goal  Identifier: sitting  Goal Description: Patient will be able to sit for 60 minutes without pain.  Rationale: to maximize safety and independence with performance of ADLs and functional tasks;to maximize safety and independence within the home;to maximize safety and independence within the community;to maximize safety and independence with self cares;to maximize safety and independence with transportation  Goal Progress: 15 min  Target Date: 09/04/23      Frequency of Treatment: 1x/wk for 4 weeks, then every other week for 8 weeks  Duration of Treatment: 12 weeks    Recommended Referrals to Other Professionals:   Education Assessment:   Learner/Method: Patient;Listening;Demonstration;No Barriers to Learning  Education Comments: Educated on anatomy and function of pelvic floor, abdomen as pressure system/can, importance of PF strength and ability to lower PF    Risks and benefits of evaluation/treatment have been explained.   Patient/Family/caregiver agrees with Plan of Care.     Evaluation Time:     PT Eval, Low Complexity Minutes (16982): 50      Signing Clinician: Alyce Tamayo PT

## 2023-06-13 DIAGNOSIS — G43.109 MIGRAINE WITH AURA AND WITHOUT STATUS MIGRAINOSUS, NOT INTRACTABLE: Primary | ICD-10-CM

## 2023-06-29 ENCOUNTER — THERAPY VISIT (OUTPATIENT)
Dept: PHYSICAL THERAPY | Facility: CLINIC | Age: 23
End: 2023-06-29
Attending: NURSE PRACTITIONER
Payer: COMMERCIAL

## 2023-06-29 DIAGNOSIS — N39.3 SUI (STRESS URINARY INCONTINENCE, FEMALE): ICD-10-CM

## 2023-06-29 DIAGNOSIS — M62.89 PELVIC FLOOR DYSFUNCTION: ICD-10-CM

## 2023-06-29 DIAGNOSIS — M53.3 PAIN IN THE COCCYX: Primary | ICD-10-CM

## 2023-06-29 PROCEDURE — 97112 NEUROMUSCULAR REEDUCATION: CPT | Mod: GP | Performed by: PHYSICAL THERAPIST

## 2023-06-29 PROCEDURE — 97110 THERAPEUTIC EXERCISES: CPT | Mod: GP | Performed by: PHYSICAL THERAPIST

## 2023-07-19 ENCOUNTER — THERAPY VISIT (OUTPATIENT)
Dept: PHYSICAL THERAPY | Facility: CLINIC | Age: 23
End: 2023-07-19
Payer: COMMERCIAL

## 2023-07-19 DIAGNOSIS — N39.3 SUI (STRESS URINARY INCONTINENCE, FEMALE): ICD-10-CM

## 2023-07-19 DIAGNOSIS — M62.89 PELVIC FLOOR DYSFUNCTION: ICD-10-CM

## 2023-07-19 DIAGNOSIS — M53.3 PAIN IN THE COCCYX: Primary | ICD-10-CM

## 2023-07-19 PROCEDURE — 97140 MANUAL THERAPY 1/> REGIONS: CPT | Mod: GP | Performed by: PHYSICAL THERAPIST

## 2023-07-19 PROCEDURE — 97112 NEUROMUSCULAR REEDUCATION: CPT | Mod: GP | Performed by: PHYSICAL THERAPIST

## 2023-07-21 ENCOUNTER — MYC MEDICAL ADVICE (OUTPATIENT)
Dept: INTERNAL MEDICINE | Facility: CLINIC | Age: 23
End: 2023-07-21
Payer: COMMERCIAL

## 2023-07-21 DIAGNOSIS — F40.243 ANXIETY WITH FLYING: Primary | ICD-10-CM

## 2023-07-26 ENCOUNTER — LAB (OUTPATIENT)
Dept: LAB | Facility: CLINIC | Age: 23
End: 2023-07-26
Payer: COMMERCIAL

## 2023-07-26 DIAGNOSIS — D80.2 SELECTIVE DEFICIENCY OF IMMUNOGLOBULIN A (IGA) (H): ICD-10-CM

## 2023-07-26 LAB
BASOPHILS # BLD AUTO: 0 10E3/UL (ref 0–0.2)
BASOPHILS NFR BLD AUTO: 1 %
CD19 CELLS # BLD: 472 CELLS/UL (ref 107–698)
CD19 CELLS NFR BLD: 23 % (ref 6–27)
CD3 CELLS # BLD: 1354 CELLS/UL (ref 603–2990)
CD3 CELLS NFR BLD: 65 % (ref 49–84)
CD3+CD4+ CELLS # BLD: 974 CELLS/UL (ref 441–2156)
CD3+CD4+ CELLS NFR BLD: 47 % (ref 28–63)
CD3+CD4+ CELLS/CD3+CD8+ CLL BLD: 3.15 % (ref 1.4–2.6)
CD3+CD8+ CELLS # BLD: 309 CELLS/UL (ref 125–1312)
CD3+CD8+ CELLS NFR BLD: 15 % (ref 10–40)
CD3-CD16+CD56+ CELLS # BLD: 238 CELLS/UL (ref 95–640)
CD3-CD16+CD56+ CELLS NFR BLD: 11 % (ref 4–25)
EOSINOPHIL # BLD AUTO: 0.2 10E3/UL (ref 0–0.7)
EOSINOPHIL NFR BLD AUTO: 3 %
ERYTHROCYTE [DISTWIDTH] IN BLOOD BY AUTOMATED COUNT: 13 % (ref 10–15)
HCT VFR BLD AUTO: 38.7 % (ref 35–47)
HGB BLD-MCNC: 12.5 G/DL (ref 11.7–15.7)
IMM GRANULOCYTES # BLD: 0 10E3/UL
IMM GRANULOCYTES NFR BLD: 0 %
LYMPHOCYTES # BLD AUTO: 1.9 10E3/UL (ref 0.8–5.3)
LYMPHOCYTES NFR BLD AUTO: 30 %
MCH RBC QN AUTO: 28.2 PG (ref 26.5–33)
MCHC RBC AUTO-ENTMCNC: 32.3 G/DL (ref 31.5–36.5)
MCV RBC AUTO: 87 FL (ref 78–100)
MONOCYTES # BLD AUTO: 0.4 10E3/UL (ref 0–1.3)
MONOCYTES NFR BLD AUTO: 6 %
NEUTROPHILS # BLD AUTO: 3.8 10E3/UL (ref 1.6–8.3)
NEUTROPHILS NFR BLD AUTO: 60 %
PLATELET # BLD AUTO: 345 10E3/UL (ref 150–450)
RBC # BLD AUTO: 4.43 10E6/UL (ref 3.8–5.2)
T CELL EXTENDED COMMENT: ABNORMAL
WBC # BLD AUTO: 6.3 10E3/UL (ref 4–11)

## 2023-07-26 PROCEDURE — 86317 IMMUNOASSAY INFECTIOUS AGENT: CPT | Mod: 90

## 2023-07-26 PROCEDURE — 86334 IMMUNOFIX E-PHORESIS SERUM: CPT

## 2023-07-26 PROCEDURE — 82787 IGG 1 2 3 OR 4 EACH: CPT

## 2023-07-26 PROCEDURE — 86359 T CELLS TOTAL COUNT: CPT

## 2023-07-26 PROCEDURE — 86357 NK CELLS TOTAL COUNT: CPT

## 2023-07-26 PROCEDURE — 86360 T CELL ABSOLUTE COUNT/RATIO: CPT

## 2023-07-26 PROCEDURE — 82784 ASSAY IGA/IGD/IGG/IGM EACH: CPT

## 2023-07-26 PROCEDURE — 85025 COMPLETE CBC W/AUTO DIFF WBC: CPT

## 2023-07-26 PROCEDURE — 36415 COLL VENOUS BLD VENIPUNCTURE: CPT

## 2023-07-26 PROCEDURE — 99000 SPECIMEN HANDLING OFFICE-LAB: CPT

## 2023-07-26 PROCEDURE — 86355 B CELLS TOTAL COUNT: CPT

## 2023-07-27 LAB — PROT PATTERN SERPL IFE-IMP: NORMAL

## 2023-07-27 RX ORDER — LORAZEPAM 0.5 MG/1
0.5 TABLET ORAL EVERY 6 HOURS PRN
Qty: 10 TABLET | Refills: 0 | Status: SHIPPED | OUTPATIENT
Start: 2023-07-27 | End: 2023-12-19

## 2023-07-28 LAB
C DIPHTHERIAE IGG SER-ACNC: 0.7 IU/ML
C TETANI TOXOID IGG SERPL IA-ACNC: 7.6 IU/ML
IGA SERPL-MCNC: <2 MG/DL (ref 84–499)
IGG SERPL-MCNC: 1659 MG/DL (ref 610–1616)
IGG SERPL-MCNC: 1659 MG/DL (ref 610–1616)
IGG1 SER-MCNC: 1017 MG/DL (ref 382–929)
IGG2 SER-MCNC: 609 MG/DL (ref 242–700)
IGG3 SER-MCNC: 36 MG/DL (ref 22–176)
IGG4 SER-MCNC: 6 MG/DL (ref 4–86)
IGM SERPL-MCNC: 84 MG/DL (ref 35–242)
SUBCLASSES, PERCENT: 101 %

## 2023-08-01 ENCOUNTER — MYC MEDICAL ADVICE (OUTPATIENT)
Dept: INTERNAL MEDICINE | Facility: CLINIC | Age: 23
End: 2023-08-01
Payer: COMMERCIAL

## 2023-08-01 LAB
S PN DA SERO 19F IGG SER-MCNC: 8.2 MCG/ML
S PNEUM DA 1 IGG SER-MCNC: 6.6 MCG/ML
S PNEUM DA 10A IGG SER-MCNC: 1.2 MCG/ML
S PNEUM DA 11A IGG SER-MCNC: 0.6 MCG/ML
S PNEUM DA 12F IGG SER-MCNC: 5 MCG/ML
S PNEUM DA 14 IGG SER-MCNC: 0.5 MCG/ML
S PNEUM DA 15B IGG SER-MCNC: 1 MCG/ML
S PNEUM DA 17F IGG SER-MCNC: 65.2 MCG/ML
S PNEUM DA 18C IGG SER-MCNC: 0.4 MCG/ML
S PNEUM DA 19A IGG SER-MCNC: 3.3 MCG/ML
S PNEUM DA 2 IGG SER-MCNC: <0.4 MCG/ML
S PNEUM DA 20A IGG SER-MCNC: <0.4 MCG/ML
S PNEUM DA 22F IGG SER-MCNC: 6.3 MCG/ML
S PNEUM DA 23F IGG SER-MCNC: 7.6 MCG/ML
S PNEUM DA 3 IGG SER-MCNC: 8.7 MCG/ML
S PNEUM DA 33F IGG SER-MCNC: 1 MCG/ML
S PNEUM DA 4 IGG SER-MCNC: 1.8 MCG/ML
S PNEUM DA 5 IGG SER-MCNC: 1.3 MCG/ML
S PNEUM DA 6B IGG SER-MCNC: 0.8 MCG/ML
S PNEUM DA 7F IGG SER-MCNC: 1.4 MCG/ML
S PNEUM DA 8 IGG SER-MCNC: 2.8 MCG/ML
S PNEUM DA 9N IGG SER-MCNC: NORMAL MCG/ML
S PNEUM DA 9V IGG SER-MCNC: 2.9 MCG/ML

## 2023-08-02 ENCOUNTER — THERAPY VISIT (OUTPATIENT)
Dept: PHYSICAL THERAPY | Facility: REHABILITATION | Age: 23
End: 2023-08-02
Attending: FAMILY MEDICINE
Payer: COMMERCIAL

## 2023-08-02 DIAGNOSIS — M79.18 MYOFASCIAL PAIN: ICD-10-CM

## 2023-08-02 DIAGNOSIS — G44.209 TENSION HEADACHE: Primary | ICD-10-CM

## 2023-08-02 PROCEDURE — 97161 PT EVAL LOW COMPLEX 20 MIN: CPT | Mod: GP | Performed by: PHYSICAL THERAPIST

## 2023-08-02 PROCEDURE — 97140 MANUAL THERAPY 1/> REGIONS: CPT | Mod: GP | Performed by: PHYSICAL THERAPIST

## 2023-08-02 ASSESSMENT — HEADACHE IMPACT TEST (HIT 6)
WHEN YOU HAVE A HEADACHE HOW OFTEN DO YOU WISH YOU COULD LIE DOWN: ALWAYS
HOW OFTEN DID HEADACHS LIMIT CONCENTRATION ON WORK OR DAILY ACTIVITY: VERY OFTEN
HOW OFTEN HAVE YOU FELT TOO TIRED TO WORK BECAUSE OF YOUR HEADACHES: VERY OFTEN
WHEN YOU HAVE HEADACHES HOW OFTEN IS THE PAIN SEVERE: VERY OFTEN
HOW OFTEN DO HEADACHES LIMIT YOUR DAILY ACTIVITIES: VERY OFTEN
HOW OFTEN HAVE YOU FELT FED UP OR IRRITATED BECAUSE OF YOUR HEADACHES: VERY OFTEN
HIT6 TOTAL SCORE: 68

## 2023-08-02 NOTE — TELEPHONE ENCOUNTER
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     RN called [facility] at [phone]. Pt is not aware of the fax number for this clinic, RN received fax number from  at [facility]: [phone].     RN faxed pt's lab results per request of patient.     WALDO VELASQUEZ RN on 8/2/2023 at 10:27 AM

## 2023-08-02 NOTE — PROGRESS NOTES
PHYSICAL THERAPY EVALUATION  Type of Visit: Evaluation    See electronic medical record for Abuse and Falls Screening details.    Subjective       Presenting condition or subjective complaint: She started having bad HA's starting in Feb 2023. She was weaning breast feeding her daughter at that time as well. They were daily Feb-May nad now they are 3-5x/week. She has had a history of HA's prior but not to this level. She has tried to figure out where they were coming from and does feel like they are more tension in nature. She does feel pressure in her head. Meds used to help but not as much anymore. Tylenol doesn't help at all. She does use an acupuncture mat, massage, food/etc to find out what is going on. There is a back left of her skull is where it starts. it does feel better to apply pressure there. She does tend to do better when sleeping on her side instead of on her stomach. Sitting to standing, walking to running - almost like if her BP changes it will make it worse for her. Doensn't wake her from sleeping. There can be sensitivity to light/sound if having a HA. Denies ringing in her ears. She does also get treatment for coccyx pain. She does feel like maybe it was broken during labor which is where the coccyx pain started. This pain is very focal to this area. Pain range: 0/10 with only pressure, 8-9/10 - can keep her home from work. She does notice that turning to the left is slower for her.  Date of onset: 02/01/23    Relevant medical history:     Dates & types of surgery:      Prior diagnostic imaging/testing results: MRI     Prior therapy history for the same diagnosis, illness or injury:        Living Environment  Social support: With a significant other or spouse   Type of home: [facility]   Stairs to enter the home: No   Is there a railing: No   Ramp: No   Stairs inside the home: No   Is there a railing: No   Help at home: None  Equipment owned:       Employment: Yes pediatric ICU nurse at  [employer]  Hobbies/Interests: reading, biking    Patient goals for therapy:      Pain assessment: See objective evaluation for additional pain details     Objective   CERVICAL SPINE EVALUATION  PAIN:   INTEGUMENTARY (edema, incisions):   POSTURE: WNL  GAIT:   Weightbearing Status:   Assistive Device(s):   Gait Deviations:   BALANCE/PROPRIOCEPTION:   WEIGHTBEARING ALIGNMENT:   ROM:   (Degrees) Left AROM Right AROM    Cervical Flexion WFL    Cervical Extension 73    Cervical Side bend      Cervical Rotation 75 75    Cervical Protrusion     Cervical Retraction     Thoracic Flexion     Thoracic Extension     Thoracic Rotation 63 65     Left AROM Left PROM Right AROM Right PROM   Shoulder Flexion WFL  WFL    Shoulder Extension       Shoulder Abduction WFL  WFL    Shoulder Adduction       Shoulder IR       Shoulder ER       Shoulder Horiz Abduction       Shoulder Horiz Adduction       Pain:   End Feel:     MYOTOMES:    Left Right   C1-2 (Neck Flexion)     C3 (Neck Side Bend)      C4 (Shrug) 5 5   C5 (Deltoid) 5 5   C6 (Biceps) 5 5   C7 (Triceps) 5 5   C8 (Thumb Ext)     T1 (Intrinsics)       PALPATION:  tension noted in coccyx, C1-2, T1-2, T3-4. Increased tone in suboccipitals and paraspinals throughout      Assessment & Plan   CLINICAL IMPRESSIONS  Medical Diagnosis: HA's    Treatment Diagnosis: tensions HA's, myofascial pain   Impression/Assessment: Patient is a 23 year old female with tensions UREÑA's complaints.  The following significant findings have been identified: Pain, Decreased ROM/flexibility, Decreased joint mobility, and Decreased activity tolerance. These impairments interfere with their ability to perform work tasks, recreational activities, and household chores as compared to previous level of function.     Clinical Decision Making (Complexity):  Clinical Presentation: Stable/Uncomplicated  Clinical Presentation Rationale: based on medical and personal factors listed in PT evaluation  Clinical Decision Making  (Complexity): Low complexity    PLAN OF CARE  Treatment Interventions:  Interventions: Manual Therapy, Neuromuscular Re-education, Therapeutic Exercise    Long Term Goals     PT Goal 1  Goal Identifier: 1  Goal Description: Pt will decrease NDI by 10 points (from 36) to show improved function in 90 days.  Target Date: 10/31/23  PT Goal 2  Goal Identifier: 2  Goal Description: Pt will verbalize a decrease in pain from 0-9/10 to 0-4/10 and HA frequecy to <1x/week to show improved function in 90 days.  Target Date: 10/31/23      Frequency of Treatment: 1x/week  Duration of Treatment: 90 days    Recommended Referrals to Other Professionals:   Education Assessment:        Risks and benefits of evaluation/treatment have been explained.   Patient/Family/caregiver agrees with Plan of Care.     Evaluation Time:     PT Eval, Low Complexity Minutes (41619): 25       Signing Clinician: JAMES RODRIGUEZ PT

## 2023-08-03 ENCOUNTER — HOSPITAL ENCOUNTER (OUTPATIENT)
Dept: MRI IMAGING | Facility: CLINIC | Age: 23
Discharge: HOME | End: 2023-08-03
Attending: PSYCHIATRY & NEUROLOGY | Admitting: PSYCHIATRY & NEUROLOGY
Payer: COMMERCIAL

## 2023-08-03 DIAGNOSIS — G43.109 MIGRAINE WITH AURA AND WITHOUT STATUS MIGRAINOSUS, NOT INTRACTABLE: ICD-10-CM

## 2023-08-03 PROCEDURE — A9585 GADOBUTROL INJECTION: HCPCS | Performed by: PSYCHIATRY & NEUROLOGY

## 2023-08-03 PROCEDURE — 70553 MRI BRAIN STEM W/O & W/DYE: CPT

## 2023-08-03 PROCEDURE — 255N000002 HC RX 255 OP 636: Performed by: PSYCHIATRY & NEUROLOGY

## 2023-08-03 RX ORDER — GADOBUTROL 604.72 MG/ML
7 INJECTION INTRAVENOUS ONCE
Status: COMPLETED | OUTPATIENT
Start: 2023-08-03 | End: 2023-08-03

## 2023-08-03 RX ADMIN — GADOBUTROL 7 ML: 604.72 INJECTION INTRAVENOUS at 18:00

## 2023-08-07 ENCOUNTER — VIRTUAL VISIT (OUTPATIENT)
Dept: NEUROLOGY | Facility: CLINIC | Age: 23
End: 2023-08-07
Payer: COMMERCIAL

## 2023-08-07 DIAGNOSIS — G43.109 MIGRAINE WITH AURA AND WITHOUT STATUS MIGRAINOSUS, NOT INTRACTABLE: Primary | ICD-10-CM

## 2023-08-07 PROCEDURE — 99214 OFFICE O/P EST MOD 30 MIN: CPT | Mod: VID | Performed by: PSYCHIATRY & NEUROLOGY

## 2023-08-07 RX ORDER — NARATRIPTAN 2.5 MG/1
2.5 TABLET ORAL
Qty: 18 TABLET | Refills: 3 | Status: SHIPPED | OUTPATIENT
Start: 2023-08-07 | End: 2023-08-07

## 2023-08-07 RX ORDER — SUMATRIPTAN 25 MG/1
25 TABLET, FILM COATED ORAL
Qty: 9 TABLET | Refills: 3 | Status: SHIPPED | OUTPATIENT
Start: 2023-08-07 | End: 2023-12-19

## 2023-08-07 RX ORDER — NARATRIPTAN 2.5 MG/1
2.5 TABLET ORAL
Qty: 9 TABLET | Refills: 3 | Status: SHIPPED | OUTPATIENT
Start: 2023-08-07 | End: 2023-12-19

## 2023-08-07 NOTE — LETTER
8/7/2023       RE: Litzy Hatch  [address] [place] [address]       Dear Colleague,    Thank you for referring your patient, Litzy Hatch, to the [facility] [place] at [facility]. Please see a copy of my visit note below.    Virtual Visit Details    Type of service:  Video Visit     Originating Location (pt. Location): Home  Distant Location (provider location):  On-site  Platform used for Video Visit: [facility]    Headache Neurology Progress Note  August 7, 2023    Subjective:    Litzy Hatch returns for follow up of migraine with visual aura. Recent MRI returned reassuring.    She reports that headaches continued to be frequent and severe until the past week or so. Pain is localized to left occipital area or less often the right occipital area. She also gets tension headaches weekly.     Caffeine is sometimes helpful for acute treatment.  Sumatriptan is effective but causes tachycardia.    She started PT with craniosacral therapy. This is helpful for tailbone pain and headache pain. She reports better range of motion. She is planning to do weekly treatments for 10 weeks.    She had been doing acupuncture, deep tissue massage, cupping as well. She is also taking magnesium and riboflavin daily.     Objective:    Vitals: There were no vitals taken for this visit.  General: Cooperative, NAD  Neurologic:  Mental Status: Fully alert, attentive and oriented. Speech clear and fluent.   Cranial Nerves: Facial movements symmetric.   Motor: No abnormal movements.      Pertinent Investigations:    MRI brain (8/3/2023):  Normal head MRI.     Assessment/Plan:   Litzy Hatch is a 23 year old woman who returns for follow-up of migraine with visual aura.  Headaches had been frequent and severe, until recently with the start of physical therapy.  She continues to have weekly  headaches.  She is considering pregnancy in the future.    We reviewed a symptomatic treatment strategy.  -For acute treatment of mild headache, she will continue naproxen as needed, not to exceed more than 14 days/month to avoid medication overuse.  This may be combined with up to 200 mg of caffeine as needed.  -If she were to become pregnant, naproxen or other NSAIDs should be switched to acetaminophen.  -For acute treatment of moderate to severe headache, sumatriptan 100 mg is causing intolerable side effects.  I recommended a trial of a lower dose of sumatriptan 25 mg, or alternatively naratriptan 2.5 mg.  She will trial each of these 3-5 times and send me a message with an update.  -For headache related nausea, I recommend ondansetron 4 mg tablet every 6 hours as needed.  -During pregnancy, lidocaine only and nerve blocks could be considered.     If her headache frequency and severity continue with the current pattern, we discussed several preventative strategies, including:  Magnesium 400 mg daily or BID  Riboflavin 200-400 mg daily   Cefaly device    We will avoid CGRP inhibitors, antiseizure medications, tricyclic antidepressants, and beta-blockers at this time.  -Botulinum toxin injections or calcium channel blockers could be considered in the future, as these options would be safer in pregnancy.     I will plan to see her back in 1 year, or sooner if needed.        Again, thank you for allowing me to participate in the care of your patient.      Sincerely,    Liliya Vera MD

## 2023-08-07 NOTE — NURSING NOTE
Is the patient currently in the state of [place]? YES    Visit mode:VIDEO    If the visit is dropped, the patient can be reconnected by: VIDEO VISIT: Text to cell phone: [phone]    Will anyone else be joining the visit? NO      How would you like to obtain your AVS? MyChart    Are changes needed to the allergy or medication list? NO    Reason for visit: RECHECK (Follow-up headaches)

## 2023-08-07 NOTE — PROGRESS NOTES
Virtual Visit Details    Type of service:  Video Visit     Originating Location (pt. Location): Home  Distant Location (provider location):  On-site  Platform used for Video Visit: [facility]    Headache Neurology Progress Note  August 7, 2023    Subjective:    Litzy Hatch returns for follow up of migraine with visual aura. Recent MRI returned reassuring.    She reports that headaches continued to be frequent and severe until the past week or so. Pain is localized to left occipital area or less often the right occipital area. She also gets tension headaches weekly.     Caffeine is sometimes helpful for acute treatment.  Sumatriptan is effective but causes tachycardia.    She started PT with craniosacral therapy. This is helpful for tailbone pain and headache pain. She reports better range of motion. She is planning to do weekly treatments for 10 weeks.    She had been doing acupuncture, deep tissue massage, cupping as well. She is also taking magnesium and riboflavin daily.     Objective:    Vitals: There were no vitals taken for this visit.  General: Cooperative, NAD  Neurologic:  Mental Status: Fully alert, attentive and oriented. Speech clear and fluent.   Cranial Nerves: Facial movements symmetric.   Motor: No abnormal movements.      Pertinent Investigations:    MRI brain (8/3/2023):  Normal head MRI.     Assessment/Plan:   Litzy Hatch is a 23 year old woman who returns for follow-up of migraine with visual aura.  Headaches had been frequent and severe, until recently with the start of physical therapy.  She continues to have weekly headaches.  She is considering pregnancy in the future.    We reviewed a symptomatic treatment strategy.  -For acute treatment of mild headache, she will continue naproxen as needed, not to exceed more than 14 days/month to avoid medication overuse.  This may be combined with up to 200 mg of caffeine as needed.  -If she were  to become pregnant, naproxen or other NSAIDs should be switched to acetaminophen.  -For acute treatment of moderate to severe headache, sumatriptan 100 mg is causing intolerable side effects.  I recommended a trial of a lower dose of sumatriptan 25 mg, or alternatively naratriptan 2.5 mg.  She will trial each of these 3-5 times and send me a message with an update.  -For headache related nausea, I recommend ondansetron 4 mg tablet every 6 hours as needed.  -During pregnancy, lidocaine only and nerve blocks could be considered.     If her headache frequency and severity continue with the current pattern, we discussed several preventative strategies, including:  Magnesium 400 mg daily or BID  Riboflavin 200-400 mg daily   Cefaly device    We will avoid CGRP inhibitors, antiseizure medications, tricyclic antidepressants, and beta-blockers at this time.  -Botulinum toxin injections or calcium channel blockers could be considered in the future, as these options would be safer in pregnancy.     I will plan to see her back in 1 year, or sooner if needed.    Liliya Vera MD  Neurology

## 2023-08-08 ENCOUNTER — THERAPY VISIT (OUTPATIENT)
Dept: PHYSICAL THERAPY | Facility: REHABILITATION | Age: 23
End: 2023-08-08
Payer: COMMERCIAL

## 2023-08-08 DIAGNOSIS — M79.18 MYOFASCIAL PAIN: ICD-10-CM

## 2023-08-08 DIAGNOSIS — G44.209 TENSION HEADACHE: Primary | ICD-10-CM

## 2023-08-08 PROCEDURE — 97140 MANUAL THERAPY 1/> REGIONS: CPT | Mod: GP | Performed by: PHYSICAL THERAPIST

## 2023-08-15 ENCOUNTER — THERAPY VISIT (OUTPATIENT)
Dept: PHYSICAL THERAPY | Facility: REHABILITATION | Age: 23
End: 2023-08-15
Payer: COMMERCIAL

## 2023-08-15 DIAGNOSIS — G44.209 TENSION HEADACHE: Primary | ICD-10-CM

## 2023-08-15 DIAGNOSIS — M79.18 MYOFASCIAL PAIN: ICD-10-CM

## 2023-08-15 PROCEDURE — 97140 MANUAL THERAPY 1/> REGIONS: CPT | Mod: GP | Performed by: PHYSICAL THERAPIST

## 2023-08-30 ENCOUNTER — THERAPY VISIT (OUTPATIENT)
Dept: PHYSICAL THERAPY | Facility: REHABILITATION | Age: 23
End: 2023-08-30
Payer: COMMERCIAL

## 2023-08-30 ENCOUNTER — MYC MEDICAL ADVICE (OUTPATIENT)
Dept: MIDWIFE SERVICES | Facility: CLINIC | Age: 23
End: 2023-08-30

## 2023-08-30 DIAGNOSIS — G44.209 TENSION HEADACHE: Primary | ICD-10-CM

## 2023-08-30 DIAGNOSIS — M79.18 MYOFASCIAL PAIN: ICD-10-CM

## 2023-08-30 PROCEDURE — 97140 MANUAL THERAPY 1/> REGIONS: CPT | Mod: GP | Performed by: PHYSICAL THERAPIST

## 2023-08-30 NOTE — TELEPHONE ENCOUNTER
Luis Mcghee,    Apple cider is just fine to use preconception and during pregnancy.    I'm less family with the term  or the product of bitters.  I'm not really sure what is actually in them, so I can't fully endorse that specific product during pregnancy.    Does that make sense?    LISA Funes

## 2023-09-05 ENCOUNTER — THERAPY VISIT (OUTPATIENT)
Dept: PHYSICAL THERAPY | Facility: REHABILITATION | Age: 23
End: 2023-09-05
Payer: COMMERCIAL

## 2023-09-05 DIAGNOSIS — M79.18 MYOFASCIAL PAIN: ICD-10-CM

## 2023-09-05 DIAGNOSIS — G44.209 TENSION HEADACHE: Primary | ICD-10-CM

## 2023-09-05 PROCEDURE — 97140 MANUAL THERAPY 1/> REGIONS: CPT | Mod: GP | Performed by: PHYSICAL THERAPIST

## 2023-09-14 ENCOUNTER — THERAPY VISIT (OUTPATIENT)
Dept: PHYSICAL THERAPY | Facility: REHABILITATION | Age: 23
End: 2023-09-14
Payer: COMMERCIAL

## 2023-09-14 DIAGNOSIS — G44.209 TENSION HEADACHE: Primary | ICD-10-CM

## 2023-09-14 DIAGNOSIS — M79.18 MYOFASCIAL PAIN: ICD-10-CM

## 2023-09-14 PROCEDURE — 97140 MANUAL THERAPY 1/> REGIONS: CPT | Mod: GP | Performed by: PHYSICAL THERAPIST

## 2023-09-20 ENCOUNTER — THERAPY VISIT (OUTPATIENT)
Dept: PHYSICAL THERAPY | Facility: REHABILITATION | Age: 23
End: 2023-09-20
Payer: COMMERCIAL

## 2023-09-20 DIAGNOSIS — M79.18 MYOFASCIAL PAIN: ICD-10-CM

## 2023-09-20 DIAGNOSIS — G44.209 TENSION HEADACHE: Primary | ICD-10-CM

## 2023-09-20 PROCEDURE — 97140 MANUAL THERAPY 1/> REGIONS: CPT | Mod: GP | Performed by: PHYSICAL THERAPIST

## 2023-09-28 ENCOUNTER — THERAPY VISIT (OUTPATIENT)
Dept: PHYSICAL THERAPY | Facility: REHABILITATION | Age: 23
End: 2023-09-28
Payer: COMMERCIAL

## 2023-09-28 DIAGNOSIS — G44.209 TENSION HEADACHE: Primary | ICD-10-CM

## 2023-09-28 DIAGNOSIS — M79.18 MYOFASCIAL PAIN: ICD-10-CM

## 2023-09-28 PROCEDURE — 97140 MANUAL THERAPY 1/> REGIONS: CPT | Mod: GP | Performed by: PHYSICAL THERAPIST

## 2023-10-10 ENCOUNTER — THERAPY VISIT (OUTPATIENT)
Dept: PHYSICAL THERAPY | Facility: REHABILITATION | Age: 23
End: 2023-10-10
Payer: COMMERCIAL

## 2023-10-10 DIAGNOSIS — M79.18 MYOFASCIAL PAIN: ICD-10-CM

## 2023-10-10 DIAGNOSIS — G44.209 TENSION HEADACHE: Primary | ICD-10-CM

## 2023-10-10 PROCEDURE — 97140 MANUAL THERAPY 1/> REGIONS: CPT | Mod: GP | Performed by: PHYSICAL THERAPIST

## 2023-10-25 ENCOUNTER — THERAPY VISIT (OUTPATIENT)
Dept: PHYSICAL THERAPY | Facility: REHABILITATION | Age: 23
End: 2023-10-25
Payer: COMMERCIAL

## 2023-10-25 DIAGNOSIS — M79.18 MYOFASCIAL PAIN: ICD-10-CM

## 2023-10-25 DIAGNOSIS — G44.209 TENSION HEADACHE: Primary | ICD-10-CM

## 2023-10-25 PROCEDURE — 97140 MANUAL THERAPY 1/> REGIONS: CPT | Mod: GP | Performed by: PHYSICAL THERAPIST

## 2023-10-27 PROBLEM — M62.89 PELVIC FLOOR DYSFUNCTION: Status: RESOLVED | Noted: 2023-06-12 | Resolved: 2023-10-27

## 2023-10-27 PROBLEM — N39.3 SUI (STRESS URINARY INCONTINENCE, FEMALE): Status: RESOLVED | Noted: 2023-06-12 | Resolved: 2023-10-27

## 2023-10-27 PROBLEM — M53.3 PAIN IN THE COCCYX: Status: RESOLVED | Noted: 2023-06-12 | Resolved: 2023-10-27

## 2023-10-27 NOTE — PROGRESS NOTES
07/19/23 0500   Appointment Info   Signing clinician's name / credentials Alyce Tamayo, PT, DPT   Total/Authorized Visits 8 (E&T)   Visits Used 3   Medical Diagnosis Pain in the coccyx   PT Tx Diagnosis Pain in the coccyx, stress UI, pelvic floor dysfunction   Progress Note/Certification   Onset of illness/injury or Date of Surgery 03/15/23  (MD order)   Therapy Frequency 1x/wk for 4 weeks, then every other week for 8 weeks   Predicted Duration 12 weeks   Progress Note Due Date 09/04/23   PT Goal 1   Goal Identifier sitting   Goal Description Patient will be able to sit for 60 minutes without pain.   Rationale to maximize safety and independence with performance of ADLs and functional tasks;to maximize safety and independence within the home;to maximize safety and independence within the community;to maximize safety and independence with self cares;to maximize safety and independence with transportation   Goal Progress 20 min   Target Date 09/04/23   Subjective Report   Subjective Report Reports tailbone has been feeling good, much better. Does occasionally report pain when sitting slouched; however, the pain resolves after 1-2 minutes, whereas before it would take a couple of hours to improve. No incontinence with sneezing, has been sneezing.   Objective Measures   Objective Measures Objective Measure 1;Objective Measure 2   Objective Measure 1   Objective Measure Obterator Internus tenderness/tone   Details L>R mod tone   Objective Measure 2   Objective Measure TrA Contraction:   Details difficult in hooklying, tendency to /brace with upper rectus about 50% of the time, able to perform correctly with cues and thought   Treatment Interventions (PT)   Interventions Therapeutic Procedure/Exercise;Therapeutic Activity;Neuromuscular Re-education;Manual Therapy;Self Care/Home Management   Therapeutic Procedure/Exercise   Therapeutic Procedures: strength, endurance, ROM, flexibillity minutes (73900) 6   PTRx Ther  Proc 1 Clamshell with Theraband   PTRx Ther Proc 1 - Details RTB x 20 reps vc/mc for glute med   PTRx Ther Proc 2 Pretzel Stretch   PTRx Ther Proc 3  Gluteal Stretch   Patient Response/Progress fatigues w/ clams - good challenge   Therapeutic Activity   PTRx Ther Act 1 Pelvic Floor Muscle Strengthening Knack   PTRx Ther Act 1 - Details No Notes   PTRx Ther Act 2 Posture Correction with Lumbar Roll   PTRx Ther Act 2 - Details No Notes   Neuromuscular Re-education   Neuromuscular re-ed of mvmt, balance, coord, kinesthetic sense, posture, proprioception minutes (00970) 18   PTRx Neuro Re-ed 1 Pelvic Floor Muscle Strengthening Basic   PTRx Neuro Re-ed 2 Diaphragmatic Breathing   PTRx Neuro Re-ed 3 Quadruped Tranversus Abdominis Activation   PTRx Neuro Re-ed 4 Abdominal Brace Transverse Abdominis   PTRx Neuro Re-ed 4 - Details x 10 reps vc/mc for TrA   PTRx Neuro Re-ed 5 Supine Abdominal Exercise #2 (Heelslide)   PTRx Neuro Re-ed 5 - Details x 10 reps reps vc/mc for TrA   PTRx Neuro Re-ed 6 Bridging Pelvic Floor    PTRx Neuro Re-ed 6 - Details w/ ball between knees gentle PF contraction x 20 reps vc for GS and PF contraction   Patient Response/Progress reports improved ability to relax PF with stretching, PF contraction w/ bridge challenging   PTRx Neuro Re-ed 7 Sit to Stand Pelvic Floor    PTRx Neuro Re-ed 7 - Details w/ PF contraction x 10 reps vc for kegel - states not very strong   PTRx Neuro Re-ed 8 All 4s Cat Cow   PTRx Neuro Re-ed 8 - Details x 10 reps with cues for inhale and exhale adding cues for PF lowering/relaxing with inhale and small contraction/zipper with exhale   PTRx Neuro Re-ed 9 Extended Dago Pose   Manual Therapy   Manual Therapy: Mobilization, MFR, MLD, friction massage minutes (00648) 8   Manual Therapy 1 Sidelying obterator internus TPR   Manual Therapy 1 - Details B, also discussed self release/anatomy w/ tennis ball   Patient Response/Progress L releases fully   Education   Learner/Method  Patient;Listening;Demonstration;No Barriers to Learning   Plan   Home program see PTrx   Plan for next session reassess PERF, self release?, progress supine ab   Total Session Time   Timed Code Treatment Minutes 32   Total Treatment Time (sum of timed and untimed services) 32         DISCHARGE  Reason for Discharge: Patient has failed to schedule further appointments.    Equipment Issued:     Discharge Plan: Patient to continue home program.    Referring Provider:  Annabella Fox

## 2023-11-08 ENCOUNTER — THERAPY VISIT (OUTPATIENT)
Dept: PHYSICAL THERAPY | Facility: REHABILITATION | Age: 23
End: 2023-11-08
Payer: COMMERCIAL

## 2023-11-08 DIAGNOSIS — M79.18 MYOFASCIAL PAIN: ICD-10-CM

## 2023-11-08 DIAGNOSIS — G44.209 TENSION HEADACHE: Primary | ICD-10-CM

## 2023-11-08 PROCEDURE — 97140 MANUAL THERAPY 1/> REGIONS: CPT | Mod: GP | Performed by: PHYSICAL THERAPIST

## 2023-11-17 DIAGNOSIS — E03.8 SUBCLINICAL HYPOTHYROIDISM: Primary | ICD-10-CM

## 2023-11-17 DIAGNOSIS — Z33.1 PREGNANCY, INCIDENTAL: ICD-10-CM

## 2023-11-21 ENCOUNTER — THERAPY VISIT (OUTPATIENT)
Dept: PHYSICAL THERAPY | Facility: REHABILITATION | Age: 23
End: 2023-11-21
Payer: COMMERCIAL

## 2023-11-21 DIAGNOSIS — G44.209 TENSION HEADACHE: Primary | ICD-10-CM

## 2023-11-21 DIAGNOSIS — M79.18 MYOFASCIAL PAIN: ICD-10-CM

## 2023-11-21 PROCEDURE — 97140 MANUAL THERAPY 1/> REGIONS: CPT | Mod: GP | Performed by: PHYSICAL THERAPIST

## 2023-11-24 ENCOUNTER — TELEPHONE (OUTPATIENT)
Dept: MIDWIFE SERVICES | Facility: CLINIC | Age: 23
End: 2023-11-24
Payer: COMMERCIAL

## 2023-11-24 DIAGNOSIS — O21.9 NAUSEA AND VOMITING OF PREGNANCY, ANTEPARTUM: Primary | ICD-10-CM

## 2023-11-24 RX ORDER — ONDANSETRON 4 MG/1
4 TABLET, ORALLY DISINTEGRATING ORAL EVERY 12 HOURS PRN
Qty: 15 TABLET | Refills: 1 | Status: SHIPPED | OUTPATIENT
Start: 2023-11-24 | End: 2023-12-05

## 2023-11-24 NOTE — TELEPHONE ENCOUNTER
"TC:  Litzy Hatch is newly pregnant and desiring [facility] care s/t hx of precipitous labor and birth, as [facility] is closer in proximity to her home.  Calling today s/t home UPT+ (11/10/23), thinking she will be six weeks soon, and feeling nauseated.  Eating frequently every 2-3 hours, \"lacy mints kindof got me through Thanksgiving, liquids are the michael of my existence\", has tried lemon in water.  Vomited twice yesterday (first day this pregnancy).  Notes Hx of hyperemeis with 15-20 lb weight loss during the first trimester with first pregnancy.  Used zofran dissolving tabs and phenergen suppositories, IV therapy previously.  Did not feel it was well managed last time.    Also notes anxiety, last two days \"mental health tanked\" after previously doing very well for six months.      Discussed home remedies for NVP.  Encouraged frequent small amounts of electrolyte balanced fluids and easy to digest foods like fortified cereals (cheerios, etc.), as well as daily walks outside.  Notes she did walk this evening and felt much better after.  Encouraged starting B6 TID and 1/2 - 1 Unisom at bedtime at this time.  Given Rx for Zofran based on hx and advised to consider taking one if vomiting three times or more in the day.  Reviewed possibilities if these are ineffective such as IV hydration therapy and further anti nausea meds.      IOB scheduled for end of December at this time.  This writer believes she may benefit from earlier pregnancy monitoring and intervention if needed, and encouraged establishing care with our practice via a  visit or moving her IOB, in the next 1-2 weeks.  Message sent to scheduling team with Litzy's agreement.    Also discussed her anxiety, reports it was \"90% relieved\" during the course of our conversation; appears to be mainly centered around NVP and fears of having hyperemesis again this pregnancy.  Discussed home remedies for anxiety PRN, and encouraged to reach " out if she would like further support or medication.    Feels reassured and agrees with plan of care.    Will reach out further as needed or desired.    LISA Denton CNM  11/24/2023  5:31 PM

## 2023-11-29 ENCOUNTER — OFFICE VISIT (OUTPATIENT)
Dept: MIDWIFE SERVICES | Facility: CLINIC | Age: 23
End: 2023-11-29
Payer: COMMERCIAL

## 2023-11-29 VITALS
BODY MASS INDEX: 23.79 KG/M2 | WEIGHT: 157 LBS | DIASTOLIC BLOOD PRESSURE: 64 MMHG | HEIGHT: 68 IN | HEART RATE: 80 BPM | SYSTOLIC BLOOD PRESSURE: 96 MMHG

## 2023-11-29 DIAGNOSIS — R11.2 NAUSEA AND VOMITING, UNSPECIFIED VOMITING TYPE: ICD-10-CM

## 2023-11-29 DIAGNOSIS — O21.0 HYPEREMESIS GRAVIDARUM: ICD-10-CM

## 2023-11-29 DIAGNOSIS — N92.6 MISSED MENSES: Primary | ICD-10-CM

## 2023-11-29 LAB — HCG UR QL: POSITIVE

## 2023-11-29 PROCEDURE — 99214 OFFICE O/P EST MOD 30 MIN: CPT | Performed by: MIDWIFE

## 2023-11-29 PROCEDURE — 81025 URINE PREGNANCY TEST: CPT | Performed by: MIDWIFE

## 2023-11-29 RX ORDER — PROMETHAZINE HYDROCHLORIDE 25 MG/1
25 SUPPOSITORY RECTAL EVERY 6 HOURS PRN
Qty: 12 SUPPOSITORY | Refills: 11 | Status: SHIPPED | OUTPATIENT
Start: 2023-11-29 | End: 2024-03-05

## 2023-11-29 RX ORDER — PROCHLORPERAZINE 25 MG/1
SUPPOSITORY RECTAL
COMMUNITY
Start: 2023-09-29 | End: 2023-12-19

## 2023-11-29 RX ORDER — HEPARIN SODIUM,PORCINE 10 UNIT/ML
5-20 VIAL (ML) INTRAVENOUS DAILY PRN
Status: CANCELLED | OUTPATIENT
Start: 2023-12-06

## 2023-11-29 RX ORDER — PROCHLORPERAZINE 25 MG/1
SUPPOSITORY RECTAL
COMMUNITY
Start: 2023-09-06 | End: 2023-12-19

## 2023-11-29 RX ORDER — HEPARIN SODIUM (PORCINE) LOCK FLUSH IV SOLN 100 UNIT/ML 100 UNIT/ML
5 SOLUTION INTRAVENOUS
Status: CANCELLED | OUTPATIENT
Start: 2023-12-06

## 2023-11-29 RX ORDER — PROCHLORPERAZINE 25 MG/1
SUPPOSITORY RECTAL
COMMUNITY
Start: 2023-11-24 | End: 2023-12-19

## 2023-11-29 NOTE — LETTER
November 29, 2023      Litzy D Nealpageromario  [address] [place] [address] [address] [place] [address]        To Whom It May Concern:    Litzy Hatch  was seen on 11/29/23.  Please excuse her  until 12/18/23 due to hyperemesis of early pregnancy, follow up in clinic planned for two weeks and will evaluate leave at that time..        Sincerely,        Sherry Rey CNM

## 2023-11-29 NOTE — PROGRESS NOTES
Litzy is a 23 yr old  at 6w5d gestation with severe nausea and vomiting. Feeling minimally better today, was able to keep down lunch. Asking about IV infusions to help with hydration. Taking Vitamin B6 TID, Unisom at night and started Zofran yesterday. In previous pregnancy didn't need meds until later in pregnancy around 9 weeks. Used phenergan for N&V last pregnancy. Reviewed comfort measure. Rx therapy plan for IV infusions twice weekly. Rx phenergan supp prn use  Note to be off of work X2 weeks then plan for clinic visit to reevaluate status to see if able to return to work or not. 30minutes on the date of the encounter doing chart review, patient visit, and documentation

## 2023-12-01 ENCOUNTER — INFUSION THERAPY VISIT (OUTPATIENT)
Dept: INFUSION THERAPY | Facility: CLINIC | Age: 23
End: 2023-12-01
Attending: MIDWIFE
Payer: COMMERCIAL

## 2023-12-01 VITALS
HEART RATE: 74 BPM | TEMPERATURE: 98.1 F | OXYGEN SATURATION: 98 % | DIASTOLIC BLOOD PRESSURE: 70 MMHG | SYSTOLIC BLOOD PRESSURE: 101 MMHG

## 2023-12-01 DIAGNOSIS — Z33.1 PREGNANCY, INCIDENTAL: ICD-10-CM

## 2023-12-01 DIAGNOSIS — O21.0 HYPEREMESIS GRAVIDARUM: Primary | ICD-10-CM

## 2023-12-01 DIAGNOSIS — E03.8 SUBCLINICAL HYPOTHYROIDISM: Primary | ICD-10-CM

## 2023-12-01 DIAGNOSIS — E03.8 SUBCLINICAL HYPOTHYROIDISM: ICD-10-CM

## 2023-12-01 LAB
T4 SERPL-MCNC: 9.1 UG/DL (ref 4.5–11.7)
TSH SERPL DL<=0.005 MIU/L-ACNC: 2.73 UIU/ML (ref 0.3–4.2)

## 2023-12-01 PROCEDURE — 84443 ASSAY THYROID STIM HORMONE: CPT

## 2023-12-01 PROCEDURE — 96365 THER/PROPH/DIAG IV INF INIT: CPT

## 2023-12-01 PROCEDURE — 36415 COLL VENOUS BLD VENIPUNCTURE: CPT

## 2023-12-01 PROCEDURE — 250N000011 HC RX IP 250 OP 636: Performed by: MIDWIFE

## 2023-12-01 PROCEDURE — 84436 ASSAY OF TOTAL THYROXINE: CPT

## 2023-12-01 PROCEDURE — 250N000009 HC RX 250: Performed by: MIDWIFE

## 2023-12-01 RX ORDER — HEPARIN SODIUM (PORCINE) LOCK FLUSH IV SOLN 100 UNIT/ML 100 UNIT/ML
5 SOLUTION INTRAVENOUS
Status: DISCONTINUED | OUTPATIENT
Start: 2023-12-01 | End: 2023-12-01 | Stop reason: HOSPADM

## 2023-12-01 RX ORDER — HEPARIN SODIUM (PORCINE) LOCK FLUSH IV SOLN 100 UNIT/ML 100 UNIT/ML
5 SOLUTION INTRAVENOUS
Status: CANCELLED | OUTPATIENT
Start: 2023-12-02

## 2023-12-01 RX ORDER — HEPARIN SODIUM,PORCINE 10 UNIT/ML
5-20 VIAL (ML) INTRAVENOUS DAILY PRN
Status: DISCONTINUED | OUTPATIENT
Start: 2023-12-01 | End: 2023-12-01 | Stop reason: HOSPADM

## 2023-12-01 RX ORDER — HEPARIN SODIUM,PORCINE 10 UNIT/ML
5-20 VIAL (ML) INTRAVENOUS DAILY PRN
Status: CANCELLED | OUTPATIENT
Start: 2023-12-02

## 2023-12-01 RX ORDER — LEVOTHYROXINE SODIUM 112 MCG
112 TABLET ORAL DAILY
Qty: 90 TABLET | Refills: 4 | Status: SHIPPED | OUTPATIENT
Start: 2023-12-01 | End: 2024-08-29

## 2023-12-01 RX ADMIN — ASCORBIC ACID, VITAMIN A PALMITATE, CHOLECALCIFEROL, THIAMINE HYDROCHLORIDE, RIBOFLAVIN-5 PHOSPHATE SODIUM, PYRIDOXINE HYDROCHLORIDE, NIACINAMIDE, DEXPANTHENOL, ALPHA-TOCOPHEROL ACETATE, VITAMIN K1, FOLIC ACID, BIOTIN, CYANOCOBALAMIN: 200; 3300; 200; 6; 3.6; 6; 40; 15; 10; 150; 600; 60; 5 INJECTION, SOLUTION INTRAVENOUS at 10:13

## 2023-12-01 NOTE — PROGRESS NOTES
Infusion Nursing Note:  Litzy Hatch presents today for IV hydration.    Patient seen by provider today: No   present during visit today: Not Applicable.    Note: /70 (Patient Position: Sitting)   Pulse 74   Temp 98.1  F (36.7  C)   LMP 10/15/2023 (Exact Date)   SpO2 98% .  1L infused. Per discussion with provider Sherry Rey, CNM - fluids changed to D5W 1/2NS + MVI and can be run over 1 hour. Pharmacy notified and orders updated.     Intravenous Access:  Peripheral IV placed. Labs drawn without difficulty.    Treatment Conditions:  Not Applicable.      Post Infusion Assessment:  Patient tolerated infusion without incident.  Blood return noted pre and post infusion.  Site patent and intact, free from redness, edema or discomfort.  No evidence of extravasations.  Access discontinued per protocol.       Discharge Plan:   Discharge instructions reviewed with: Patient.  Patient and/or family verbalized understanding of discharge instructions and all questions answered.  Patient discharged in stable condition accompanied by: self.  Departure Mode: Ambulatory.      Estephania Boyd RN

## 2023-12-05 ENCOUNTER — TELEPHONE (OUTPATIENT)
Dept: MIDWIFE SERVICES | Facility: CLINIC | Age: 23
End: 2023-12-05
Payer: COMMERCIAL

## 2023-12-05 ENCOUNTER — INFUSION THERAPY VISIT (OUTPATIENT)
Dept: INFUSION THERAPY | Facility: CLINIC | Age: 23
End: 2023-12-05
Attending: MIDWIFE
Payer: COMMERCIAL

## 2023-12-05 VITALS
TEMPERATURE: 97.9 F | OXYGEN SATURATION: 97 % | DIASTOLIC BLOOD PRESSURE: 64 MMHG | SYSTOLIC BLOOD PRESSURE: 96 MMHG | RESPIRATION RATE: 18 BRPM | HEART RATE: 67 BPM

## 2023-12-05 DIAGNOSIS — O21.0 HYPEREMESIS GRAVIDARUM: Primary | ICD-10-CM

## 2023-12-05 DIAGNOSIS — O21.9 NAUSEA AND VOMITING OF PREGNANCY, ANTEPARTUM: ICD-10-CM

## 2023-12-05 PROCEDURE — 258N000003 HC RX IP 258 OP 636: Performed by: MIDWIFE

## 2023-12-05 PROCEDURE — 250N000009 HC RX 250: Performed by: MIDWIFE

## 2023-12-05 PROCEDURE — 96365 THER/PROPH/DIAG IV INF INIT: CPT

## 2023-12-05 RX ORDER — ONDANSETRON 4 MG/1
4 TABLET, ORALLY DISINTEGRATING ORAL EVERY 8 HOURS PRN
Qty: 90 TABLET | Refills: 1 | Status: SHIPPED | OUTPATIENT
Start: 2023-12-05 | End: 2023-12-05

## 2023-12-05 RX ORDER — HEPARIN SODIUM,PORCINE 10 UNIT/ML
5-20 VIAL (ML) INTRAVENOUS DAILY PRN
Status: CANCELLED | OUTPATIENT
Start: 2023-12-08

## 2023-12-05 RX ORDER — ONDANSETRON 4 MG/1
4 TABLET, ORALLY DISINTEGRATING ORAL EVERY 8 HOURS PRN
Qty: 90 TABLET | Refills: 1 | Status: SHIPPED | OUTPATIENT
Start: 2023-12-05 | End: 2024-02-20

## 2023-12-05 RX ORDER — HEPARIN SODIUM (PORCINE) LOCK FLUSH IV SOLN 100 UNIT/ML 100 UNIT/ML
5 SOLUTION INTRAVENOUS
Status: CANCELLED | OUTPATIENT
Start: 2023-12-08

## 2023-12-05 RX ORDER — METOCLOPRAMIDE 5 MG/1
5 TABLET ORAL
Qty: 120 TABLET | Refills: 1 | Status: SHIPPED | OUTPATIENT
Start: 2023-12-05 | End: 2024-02-20

## 2023-12-05 RX ADMIN — ASCORBIC ACID, VITAMIN A PALMITATE, CHOLECALCIFEROL, THIAMINE HYDROCHLORIDE, RIBOFLAVIN-5 PHOSPHATE SODIUM, PYRIDOXINE HYDROCHLORIDE, NIACINAMIDE, DEXPANTHENOL, ALPHA-TOCOPHEROL ACETATE, VITAMIN K1, FOLIC ACID, BIOTIN, CYANOCOBALAMIN: 200; 3300; 200; 6; 3.6; 6; 40; 15; 10; 150; 600; 60; 5 INJECTION, SOLUTION INTRAVENOUS at 14:51

## 2023-12-05 NOTE — PROGRESS NOTES
Infusion Nursing Note:  Litzy Hatch presents today for IV fluids.    Patient seen by provider today: No   present during visit today: Not Applicable.    Note: Tolerated infusion well, offers no complaints.      Intravenous Access:  Peripheral IV placed.    Treatment Conditions:  Not Applicable.      Post Infusion Assessment:  Patient tolerated infusion without incident.  No evidence of extravasations.  Access discontinued per protocol.       Discharge Plan:   Patient and/or family verbalized understanding of discharge instructions and all questions answered.      Reba Garsia RN IV fluids

## 2023-12-05 NOTE — TELEPHONE ENCOUNTER
PHONE CALL TO CNM:  iLtzy contacts CNM to talk about trying to achieve better relief of her nausea.    Zofran is helping so much!  But, she is finding that she's struggling with nausea half-way through her 2 scheduled doses/day.  Also, she even has symptoms as soon as 6 hours post dose.  Developed the following ideas:  Moved from q 12 hours to q 8 hour dosing (still at 4mg, can consider increasing PRN).  Litzy really thinks the unisom and vitamin B6 help her a lot.  Only takes the unisom at night.  Considering daytime dosing.  Recommended that she increase to 2 pill of unisom/day (1 whole pill at bedtime, and a 1/2 pill 2 other times during the day).  If this doesn't help nausea or if it makes her too sleepy, we can move on to Reglan  Discussed option of oral Reglan QID.  RX sent for this, and Litzy will only fill it if the Unisom doesn't work, or if the side effect of fatigue is too much.    Finally, Litzy also has a RX or PRN Phenergan suppositories.  Will use an artifical lubricant if she uses these due to the hemorrhoids she has from the pregnancy (no constipation, just hemorrhoids have popped out since she getting pregnant).

## 2023-12-05 NOTE — TELEPHONE ENCOUNTER
[facility] Call Center    Phone Message    May a detailed message be left on voicemail: yes     Reason for Call: Other: Pt would like a call back to discuss medications she is taking to manage her hyperemesis. Please call to discuss.     Action Taken: Other: OBGYN    Travel Screening: Not Applicable

## 2023-12-07 ENCOUNTER — INFUSION THERAPY VISIT (OUTPATIENT)
Dept: INFUSION THERAPY | Facility: CLINIC | Age: 23
End: 2023-12-07
Attending: MIDWIFE
Payer: COMMERCIAL

## 2023-12-07 VITALS
TEMPERATURE: 98.1 F | DIASTOLIC BLOOD PRESSURE: 70 MMHG | SYSTOLIC BLOOD PRESSURE: 97 MMHG | OXYGEN SATURATION: 98 % | RESPIRATION RATE: 18 BRPM | HEART RATE: 66 BPM

## 2023-12-07 DIAGNOSIS — O21.0 HYPEREMESIS GRAVIDARUM: Primary | ICD-10-CM

## 2023-12-07 PROCEDURE — 258N000003 HC RX IP 258 OP 636: Performed by: MIDWIFE

## 2023-12-07 PROCEDURE — 250N000009 HC RX 250: Performed by: MIDWIFE

## 2023-12-07 PROCEDURE — 96365 THER/PROPH/DIAG IV INF INIT: CPT

## 2023-12-07 RX ORDER — HEPARIN SODIUM (PORCINE) LOCK FLUSH IV SOLN 100 UNIT/ML 100 UNIT/ML
5 SOLUTION INTRAVENOUS
Status: CANCELLED | OUTPATIENT
Start: 2023-12-08

## 2023-12-07 RX ORDER — HEPARIN SODIUM,PORCINE 10 UNIT/ML
5-20 VIAL (ML) INTRAVENOUS DAILY PRN
Status: CANCELLED | OUTPATIENT
Start: 2023-12-08

## 2023-12-07 RX ADMIN — ASCORBIC ACID, VITAMIN A PALMITATE, CHOLECALCIFEROL, THIAMINE HYDROCHLORIDE, RIBOFLAVIN-5 PHOSPHATE SODIUM, PYRIDOXINE HYDROCHLORIDE, NIACINAMIDE, DEXPANTHENOL, ALPHA-TOCOPHEROL ACETATE, VITAMIN K1, FOLIC ACID, BIOTIN, CYANOCOBALAMIN: 200; 3300; 200; 6; 3.6; 6; 40; 15; 10; 150; 600; 60; 5 INJECTION, SOLUTION INTRAVENOUS at 14:49

## 2023-12-07 NOTE — PROGRESS NOTES
Infusion Nursing Note:  Litzy Hatch presents today for IVF.    Patient seen by provider today: No   present during visit today: Not Applicable.    Note: BP 97/70 (BP Location: Left arm, Patient Position: Sitting)   Pulse 66   Temp 98.1  F (36.7  C) (Oral)   Resp 18   LMP 10/15/2023 (Exact Date)   SpO2 98%   .      Intravenous Access:  Peripheral IV placed.    Treatment Conditions:  Not Applicable.      Post Infusion Assessment:  Patient tolerated infusion without incident.  Blood return noted pre and post infusion.  Site patent and intact, free from redness, edema or discomfort.  No evidence of extravasations.  Access discontinued per protocol.       Discharge Plan:   Patient discharged in stable condition accompanied by: self.  Departure Mode: Ambulatory.      Meme Ortega RN

## 2023-12-11 ENCOUNTER — APPOINTMENT (OUTPATIENT)
Dept: ULTRASOUND IMAGING | Facility: CLINIC | Age: 23
End: 2023-12-11
Attending: EMERGENCY MEDICINE
Payer: COMMERCIAL

## 2023-12-11 ENCOUNTER — INFUSION THERAPY VISIT (OUTPATIENT)
Dept: INFUSION THERAPY | Facility: CLINIC | Age: 23
End: 2023-12-11
Attending: MIDWIFE
Payer: COMMERCIAL

## 2023-12-11 ENCOUNTER — HOSPITAL ENCOUNTER (EMERGENCY)
Facility: CLINIC | Age: 23
Discharge: HOME | End: 2023-12-11
Attending: EMERGENCY MEDICINE | Admitting: EMERGENCY MEDICINE
Payer: COMMERCIAL

## 2023-12-11 VITALS
SYSTOLIC BLOOD PRESSURE: 97 MMHG | BODY MASS INDEX: 23.7 KG/M2 | RESPIRATION RATE: 16 BRPM | HEIGHT: 69 IN | OXYGEN SATURATION: 99 % | WEIGHT: 160 LBS | HEART RATE: 84 BPM | DIASTOLIC BLOOD PRESSURE: 54 MMHG | TEMPERATURE: 98.5 F

## 2023-12-11 VITALS
TEMPERATURE: 98.1 F | DIASTOLIC BLOOD PRESSURE: 58 MMHG | OXYGEN SATURATION: 98 % | HEART RATE: 74 BPM | RESPIRATION RATE: 16 BRPM | SYSTOLIC BLOOD PRESSURE: 109 MMHG

## 2023-12-11 DIAGNOSIS — O21.0 HYPEREMESIS GRAVIDARUM: Primary | ICD-10-CM

## 2023-12-11 DIAGNOSIS — R55 SYNCOPE, UNSPECIFIED SYNCOPE TYPE: ICD-10-CM

## 2023-12-11 LAB
ANION GAP SERPL CALCULATED.3IONS-SCNC: 8 MMOL/L (ref 7–15)
BASOPHILS # BLD AUTO: 0.1 10E3/UL (ref 0–0.2)
BASOPHILS NFR BLD AUTO: 0 %
BUN SERPL-MCNC: 8.8 MG/DL (ref 6–20)
CALCIUM SERPL-MCNC: 9.4 MG/DL (ref 8.6–10)
CHLORIDE SERPL-SCNC: 103 MMOL/L (ref 98–107)
CREAT SERPL-MCNC: 0.58 MG/DL (ref 0.51–0.95)
DEPRECATED HCO3 PLAS-SCNC: 23 MMOL/L (ref 22–29)
EGFRCR SERPLBLD CKD-EPI 2021: >90 ML/MIN/1.73M2
EOSINOPHIL # BLD AUTO: 0.1 10E3/UL (ref 0–0.7)
EOSINOPHIL NFR BLD AUTO: 1 %
ERYTHROCYTE [DISTWIDTH] IN BLOOD BY AUTOMATED COUNT: 12.6 % (ref 10–15)
GLUCOSE BLDC GLUCOMTR-MCNC: 93 MG/DL (ref 70–99)
GLUCOSE SERPL-MCNC: 93 MG/DL (ref 70–99)
HCT VFR BLD AUTO: 37.6 % (ref 35–47)
HGB BLD-MCNC: 12.6 G/DL (ref 11.7–15.7)
IMM GRANULOCYTES # BLD: 0.1 10E3/UL
IMM GRANULOCYTES NFR BLD: 1 %
LYMPHOCYTES # BLD AUTO: 1.8 10E3/UL (ref 0.8–5.3)
LYMPHOCYTES NFR BLD AUTO: 14 %
MAGNESIUM SERPL-MCNC: 2.1 MG/DL (ref 1.7–2.3)
MCH RBC QN AUTO: 28.8 PG (ref 26.5–33)
MCHC RBC AUTO-ENTMCNC: 33.5 G/DL (ref 31.5–36.5)
MCV RBC AUTO: 86 FL (ref 78–100)
MONOCYTES # BLD AUTO: 0.5 10E3/UL (ref 0–1.3)
MONOCYTES NFR BLD AUTO: 4 %
NEUTROPHILS # BLD AUTO: 10.5 10E3/UL (ref 1.6–8.3)
NEUTROPHILS NFR BLD AUTO: 80 %
NRBC # BLD AUTO: 0 10E3/UL
NRBC BLD AUTO-RTO: 0 /100
PLATELET # BLD AUTO: 311 10E3/UL (ref 150–450)
POTASSIUM SERPL-SCNC: 4.2 MMOL/L (ref 3.4–5.3)
RBC # BLD AUTO: 4.37 10E6/UL (ref 3.8–5.2)
SODIUM SERPL-SCNC: 134 MMOL/L (ref 135–145)
WBC # BLD AUTO: 13 10E3/UL (ref 4–11)

## 2023-12-11 PROCEDURE — 76801 OB US < 14 WKS SINGLE FETUS: CPT

## 2023-12-11 PROCEDURE — 258N000003 HC RX IP 258 OP 636: Performed by: MIDWIFE

## 2023-12-11 PROCEDURE — 83735 ASSAY OF MAGNESIUM: CPT | Performed by: EMERGENCY MEDICINE

## 2023-12-11 PROCEDURE — 96374 THER/PROPH/DIAG INJ IV PUSH: CPT

## 2023-12-11 PROCEDURE — 99285 EMERGENCY DEPT VISIT HI MDM: CPT | Mod: 25

## 2023-12-11 PROCEDURE — 85025 COMPLETE CBC W/AUTO DIFF WBC: CPT | Performed by: EMERGENCY MEDICINE

## 2023-12-11 PROCEDURE — 82962 GLUCOSE BLOOD TEST: CPT

## 2023-12-11 PROCEDURE — 250N000009 HC RX 250: Performed by: MIDWIFE

## 2023-12-11 PROCEDURE — 36415 COLL VENOUS BLD VENIPUNCTURE: CPT | Performed by: EMERGENCY MEDICINE

## 2023-12-11 PROCEDURE — 82310 ASSAY OF CALCIUM: CPT | Performed by: EMERGENCY MEDICINE

## 2023-12-11 RX ORDER — HEPARIN SODIUM,PORCINE 10 UNIT/ML
5-20 VIAL (ML) INTRAVENOUS DAILY PRN
Status: CANCELLED | OUTPATIENT
Start: 2023-12-12

## 2023-12-11 RX ORDER — HEPARIN SODIUM,PORCINE 10 UNIT/ML
5-20 VIAL (ML) INTRAVENOUS DAILY PRN
Status: DISCONTINUED | OUTPATIENT
Start: 2023-12-11 | End: 2023-12-11 | Stop reason: HOSPADM

## 2023-12-11 RX ORDER — HEPARIN SODIUM (PORCINE) LOCK FLUSH IV SOLN 100 UNIT/ML 100 UNIT/ML
5 SOLUTION INTRAVENOUS
Status: DISCONTINUED | OUTPATIENT
Start: 2023-12-11 | End: 2023-12-11 | Stop reason: HOSPADM

## 2023-12-11 RX ORDER — HEPARIN SODIUM (PORCINE) LOCK FLUSH IV SOLN 100 UNIT/ML 100 UNIT/ML
5 SOLUTION INTRAVENOUS
Status: CANCELLED | OUTPATIENT
Start: 2023-12-12

## 2023-12-11 RX ADMIN — ASCORBIC ACID, VITAMIN A PALMITATE, CHOLECALCIFEROL, THIAMINE HYDROCHLORIDE, RIBOFLAVIN-5 PHOSPHATE SODIUM, PYRIDOXINE HYDROCHLORIDE, NIACINAMIDE, DEXPANTHENOL, ALPHA-TOCOPHEROL ACETATE, VITAMIN K1, FOLIC ACID, BIOTIN, CYANOCOBALAMIN: 200; 3300; 200; 6; 3.6; 6; 40; 15; 10; 150; 600; 60; 5 INJECTION, SOLUTION INTRAVENOUS at 13:24

## 2023-12-11 ASSESSMENT — ACTIVITIES OF DAILY LIVING (ADL): ADLS_ACUITY_SCORE: 35

## 2023-12-11 NOTE — DISCHARGE INSTRUCTIONS
Follow up with your obgyn for ongoing evaluation and care.  Talk with them about if you should continue infusions, or hold off for awhile and see how you're doing. Return to the ER for new/worsening concerns.

## 2023-12-11 NOTE — ED NOTES
"Blood drawn. IVF started. Pt eating pretzels. Sat forward \"I'm feeling weird again\". Pt instructed to sit back. VSS. Reports feeling dissipating. Asking to have IVF stopped. \"I'm not nauseous, can I just drink water\". Given ice water per request.   "

## 2023-12-11 NOTE — ED TRIAGE NOTES
"Arrives to ED from infusion center after RR for syncope. Pt received infusions twice a week for hyperemesis. Receives LR \"with adult vitamins\". Has received infusion \"at least four times before\". The infusion \"had been going for only minutes\" when pt began to have vision loss, SOB, \"it felt like my airway was closing\", tinnitus. \"They laid me back and I was fine after that\". Reports was eating pretzels prior. Endorses lower abd cramping \"when I woke up\". 8 weeks pregnant, . Reports \"a little chest tightness\" and dizziness. VSS. A/Ox4.      Triage Assessment (Adult)       Row Name [id number]          Triage Assessment    Airway WDL WDL        Respiratory WDL    Respiratory WDL WDL        Skin Circulation/Temperature WDL    Skin Circulation/Temperature WDL WDL        Cardiac WDL    Cardiac WDL WDL        Peripheral/Neurovascular WDL    Peripheral Neurovascular WDL WDL        Cognitive/Neuro/Behavioral WDL    Cognitive/Neuro/Behavioral WDL WDL                     "

## 2023-12-11 NOTE — ED NOTES
[facility]  Expected date:   Expected time:   Means of arrival:   Comments:  For rapid, if needed

## 2023-12-11 NOTE — PROGRESS NOTES
Infusion Nursing Note:  Litzy Hatch presents today for IVF.    Patient seen by provider today: No   present during visit today: Not Applicable.    Note: Pt arrives to infusion today with continued nausea and vomiting due to being 8 weeks pregnant. She also reports dizziness with change in position. Denies any fever, chills, cough, sob or any other new complaints or concerns.     Intravenous Access:  Peripheral IV placed.    Treatment Conditions:  Not Applicable.    Post Infusion Assessment:  Patient tolerated infusion poorly due to : Hypersensitivity: Did patient have a hypersensitivity reaction? : Yes  Drug or Product name: LR + Infuvite  Were pre-meds administered?: No     First or Subsequent treatment: Subsequent infusion  Rate of infusion when patient had hypersensitivity reaction: 999  Time the hypersensitivity reaction was first recognized: 1325  Symptoms observed or reported (select all that apply): Flushing;Hypoxia;Shortness of breath;Other: (Comment);Erythema (Facial swelling, abdominal cramping, loss of vision & hearing, feeling like passing out)  Interventions/treatment following reaction: Infusion stopped;Oxygen applied;Rapid Response called;Transported to hospital  What hypersensitivity medications were administered?: NaCl 0.9% Bolus  Name of provider notified: Rapid response           Site patent and intact, free from redness, edema or discomfort.  No evidence of extravasations.  Access continued to ED.    Discharge Plan:   Patient discharged in stable condition to the ED accompanied by: hospital staff.  Departure Mode: Wheelchair.    Prachi Paul RN

## 2023-12-11 NOTE — ED PROVIDER NOTES
"  Emergency Department Encounter     Evaluation Date & Time:   2023  1:43 PM    CHIEF COMPLAINT:  Syncope      Triage Note:Arrives to ED from infusion center after RR for syncope. Pt received infusions twice a week for hyperemesis. Receives LR \"with adult vitamins\". Has received infusion \"at least four times before\". The infusion \"had been going for only minutes\" when pt began to have vision loss, SOB, \"it felt like my airway was closing\", tinnitus. \"They laid me back and I was fine after that\". Reports was eating pretzels prior. Endorses lower abd cramping \"when I woke up\". 8 weeks pregnant, . Reports \"a little chest tightness\" and dizziness. VSS. A/Ox4.                  ED COURSE & MEDICAL DECISION MAKING:     ED Course as of 23   Mon Dec 11, 2023   1413 Met with the patient and performed my initial exam.   1601 US shows IUP at 8W4D.  Labs overall unremarkable.  Will update, speak with pt.   1601 Rechecked and updated patient.  Remains entirely asymptomatic/well here.  Discussed results with her, outpatient obgyn follow up.  Pt comfortable with discharge.       Pt is a 23 yo F pregnant at approximately 8 weeks, , dealing with hyperemesis during pregnancy and receives regular IVF infusions with multivitamins.  She was again getting infusion today, shortly after IV placed and fluids started, developed difficulty breathing, sensation of facial swelling and passed out.  Pt was seated, no fall or head injury.  She woke up drenched in sweat with people around her.  Brief abdominal cramping, but none now.  No cp/sob.  She was not given anything for symptoms, but is now asymptomatic and well. She was well prior to this. She's received this infusion before without incident and has not had a history of syncope/vasovagal.  Suspect this is more likely vasovagal, but consideration for allergic reaction or other cause as well. Will get labs, US to confirm pregnancy as she has not yet had one.  No " bleeding or ongoing cramping.  She has no current respiratory difficulty or chest pain to suggest PE or need for CTA chest.      Medical Decision Making    History:  Supplemental history from: Documented in chart, if applicable  External Record(s) reviewed: Outpatient Record: Infusion therapy visit to [facility] for IVF on 12/7/2023      Work Up:  Chart documentation includes differential considered and any EKGs or imaging independently interpreted by provider, where specified.  In additional to work up documented, I considered the following work up: Documented in chart, if applicable.    External consultation:  Discussion of management with another provider: Documented in chart, if applicable    Complicating factors:  Care impacted by chronic illness: Other: Hashimoto's thyroiditis and hypothyroidism  Care affected by social determinants of health: N/A    Disposition considerations: Discharge. No recommendations on prescription strength medication(s). I considered admission, but ultimately discharged patient after reassuring evaluation, outpatient follow up plan.      At the conclusion of the encounter I discussed the results of all the tests and the disposition. The questions were answered. The patient or family acknowledged understanding and was agreeable with the care plan.      MEDICATIONS GIVEN IN THE EMERGENCY DEPARTMENT:  Medications   sodium chloride 0.9% BOLUS 1,000 mL (1,000 mLs Intravenous Not Given 12/11/23 1440)       NEW PRESCRIPTIONS STARTED AT TODAY'S ED VISIT:  Discharge Medication List as of 12/11/2023  4:18 PM          HPI   The history is provided by the patient. No  was used.        Litzy Hatch is a 23 year old female with a pertinent history of hypothyroidism, hyperemesis gravidarum, Hashimoto's thyroiditis,  who presents to this ED via private car for evaluation of syncope.    Patient is 8 weeks pregnant and has a history of  "hyperemesis gravidarum. She received reports she receives LR and double vitamins twice a week, but today when she was received the infusion she passed out for about 10-15 minutes. 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 States 15 seconds within receiving infusion while sitting, she felt her face swell up, lost vision, lost hearing, and couldn't breath. When she woke up, she was shaky, flat, covered in sweat, \"vision was spotty\", and she couldn't take a deep breathe. Notes this took place about 1 and 1/2 hours ago. Notes she wasn't given medications before infusion started. States she was eating a pretzel before she passed out, and when she woke up pretzel was dissolved in her mouth, noting she wasn't out for long. She had abdominal cramping initially, but not now. This is her 2nd pregnancy. No prior history of pregnancy complications with either pregnancy. Denies chest pain, trouble breathing, vaginal bleeding, and vaginal discharge. No other reported complaints or concerns at this time.    Per chart review, the patient presented to [facility] for IVF on 12/7/2023. Patient tolerated infusion without incident. Blood return noted pre and post infusion. Site patent and intact, free from redness, edema or discomfort. No evidence of extravasations. Patient discharged in stable condition.      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    Autoimmune disease (H24)     Concussion 01/18/2015    Concussion 01/2015    Dysmenorrhea     Exercise induced bronchospasm     Gastroesophageal reflux disease     Iron deficiency anemia due to chronic blood loss 10/22/2021    Migraines     Small intestinal bacterial overgrowth     Subclinical hypothyroidism 11/2020    Wrist tendonitis        Past Surgical History:   Procedure Laterality " Date    DILATION AND CURETTAGE SUCTION N/A 3/1/2022    Procedure: DILATION AND CURETTAGE, UTERUS, USING SUCTION;  Surgeon: Mariposa Manuel MD;  Location: [facility] Main OR    WISDOM TEETH REMOVAL  03/26/2021       Family History   Problem Relation Age of Onset    Allergies Mother         Nickel    Anxiety Disorder Mother     Hyperlipidemia Mother     Attention Deficit Disorder Sister     Allergies Brother         Currently on AIT- noted 5/12/2021    Attention Deficit Disorder Brother     Hypothyroidism Maternal Grandmother     Asthma Maternal Grandmother     Thyroid Disease Maternal Grandmother         Hashimoto's    Hypertension Maternal Grandmother     Breast Cancer Paternal Grandmother     Hypothyroidism Maternal Aunt     Breast Cancer Maternal Great-Grandmother     Hypothyroidism Maternal Great-Grandmother     Diabetes No family hx of     Glaucoma No family hx of     Macular Degeneration No family hx of        Social History     Tobacco Use    Smoking status: Never     Passive exposure: Never    Smokeless tobacco: Never   Vaping Use    Vaping Use: Never used   Substance Use Topics    Alcohol use: Not Currently     Comment: 1-2 X per week    Drug use: Never       azelaic acid (FINACIA) 15 % external gel  Continuous Blood Gluc  (DEXCOM G6 ) VASU  Continuous Blood Gluc Sensor (DEXCOM G6 SENSOR) MISC  Continuous Blood Gluc Transmit (DEXCOM G6 TRANSMITTER) MISC  doxylamine (UNISOM SLEEPTABS) 25 MG TABS tablet  LORazepam (ATIVAN) 0.5 MG tablet  magnesium gluconate (MAGONATE) 500 (27 Mg) MG tablet  metoclopramide (REGLAN) 5 MG tablet  naproxen (NAPROSYN) 500 MG tablet  naratriptan (AMERGE) 2.5 MG tablet  ondansetron (ZOFRAN ODT) 4 MG ODT tab  Prenatal Vit-Fe Fumarate-FA (PRENATAL MULTIVITAMIN W/IRON) 27-0.8 MG tablet  promethazine (PHENERGAN) 25 MG suppository  Riboflavin 400 MG TABS  SUMAtriptan (IMITREX) 25 MG tablet  SYNTHROID 112 MCG tablet  tretinoin (RETIN-A) 0.05 % external  "cream        Physical Exam     Vitals:  BP 97/54   Pulse 84   Temp 98.5  F (36.9  C) (Oral)   Resp 16   Ht 1.753 m (5' 9\")   Wt 72.6 kg (160 lb)   LMP 10/15/2023 (Exact Date)   SpO2 99%   BMI 23.63 kg/m      PHYSICAL EXAM:   Physical Exam  Vitals and nursing note reviewed.   Constitutional:       General: She is not in acute distress.     Appearance: Normal appearance.   HENT:      Head: Normocephalic and atraumatic.      Nose: Nose normal.      Mouth/Throat:      Mouth: Mucous membranes are moist.   Eyes:      Pupils: Pupils are equal, round, and reactive to light.   Cardiovascular:      Rate and Rhythm: Normal rate and regular rhythm.      Pulses: Normal pulses.           Radial pulses are 2+ on the right side and 2+ on the left side.        Dorsalis pedis pulses are 2+ on the right side and 2+ on the left side.   Pulmonary:      Effort: Pulmonary effort is normal. No respiratory distress.      Breath sounds: Normal breath sounds.   Abdominal:      Palpations: Abdomen is soft.      Tenderness: There is no abdominal tenderness.   Musculoskeletal:      Cervical back: Full passive range of motion without pain and neck supple.      Comments: No calf tenderness or swelling b/l   Skin:     General: Skin is warm.      Findings: No rash.   Neurological:      General: No focal deficit present.      Mental Status: She is alert. Mental status is at baseline.      Comments: Fluent speech, no acute lateralizing deficits   Psychiatric:         Mood and Affect: Mood normal.         Behavior: Behavior normal.           Results     LAB:  All pertinent labs reviewed and interpreted  Labs Ordered and Resulted from Time of ED Arrival to Time of ED Departure   BASIC METABOLIC PANEL - Abnormal       Result Value    Sodium 134 (*)     Potassium 4.2      Chloride 103      Carbon Dioxide (CO2) 23      Anion Gap 8      Urea Nitrogen 8.8      Creatinine 0.58      GFR Estimate >90      Calcium 9.4      Glucose 93     CBC WITH " PLATELETS AND DIFFERENTIAL - Abnormal    WBC Count 13.0 (*)     RBC Count 4.37      Hemoglobin 12.6      Hematocrit 37.6      MCV 86      MCH 28.8      MCHC 33.5      RDW 12.6      Platelet Count 311      % Neutrophils 80      % Lymphocytes 14      % Monocytes 4      % Eosinophils 1      % Basophils 0      % Immature Granulocytes 1      NRBCs per 100 WBC 0      Absolute Neutrophils 10.5 (*)     Absolute Lymphocytes 1.8      Absolute Monocytes 0.5      Absolute Eosinophils 0.1      Absolute Basophils 0.1      Absolute Immature Granulocytes 0.1      Absolute NRBCs 0.0     MAGNESIUM - Normal    Magnesium 2.1         RADIOLOGY:  US OB < 14 Weeks Single   Final Result   Addendum (preliminary) 1 of 1   Correction to the technique section of the report: Endovaginal ultrasound    was not performed. Only transabdominal images were obtained.      Final   IMPRESSION:       1.  Single living intrauterine gestation at 8 weeks 4 days, EDC 07/18/2024.                   ECG:  none    PROCEDURES:  Procedures:  none      FINAL IMPRESSION:    ICD-10-CM    1. Syncope, unspecified syncope type  R55           0 minutes of critical care time      I, Chari Aly, am serving as a scribe to document services personally performed by Dr. Jake Little, based on my observations and the provider's statements to me. I, Jake Little, DO attest that Chari Aly is acting in a scribe capacity, has observed my performance of the services and has documented them in accordance with my direction.      Jake Little DO  Emergency Medicine  [facility] EMERGENCY ROOM  12/11/2023  2:19 PM          Jake Little MD  12/11/23 1829

## 2023-12-18 ENCOUNTER — OFFICE VISIT (OUTPATIENT)
Dept: MIDWIFE SERVICES | Facility: CLINIC | Age: 23
End: 2023-12-18
Payer: COMMERCIAL

## 2023-12-18 VITALS
BODY MASS INDEX: 23.14 KG/M2 | WEIGHT: 156.7 LBS | DIASTOLIC BLOOD PRESSURE: 56 MMHG | HEART RATE: 82 BPM | SYSTOLIC BLOOD PRESSURE: 98 MMHG

## 2023-12-18 DIAGNOSIS — O21.9 NAUSEA AND VOMITING OF PREGNANCY, ANTEPARTUM: Primary | ICD-10-CM

## 2023-12-18 LAB
ALBUMIN UR-MCNC: NEGATIVE MG/DL
APPEARANCE UR: CLEAR
BILIRUB UR QL STRIP: NEGATIVE
COLOR UR AUTO: YELLOW
GLUCOSE UR STRIP-MCNC: NEGATIVE MG/DL
HGB UR QL STRIP: NEGATIVE
KETONES UR STRIP-MCNC: NEGATIVE MG/DL
LEUKOCYTE ESTERASE UR QL STRIP: NEGATIVE
NITRATE UR QL: NEGATIVE
PH UR STRIP: 6.5 [PH] (ref 5–8)
SP GR UR STRIP: 1.02 (ref 1–1.03)
UROBILINOGEN UR STRIP-ACNC: 0.2 E.U./DL

## 2023-12-18 PROCEDURE — 99214 OFFICE O/P EST MOD 30 MIN: CPT | Performed by: MIDWIFE

## 2023-12-18 PROCEDURE — 81003 URINALYSIS AUTO W/O SCOPE: CPT | Performed by: MIDWIFE

## 2023-12-19 RX ORDER — ONDANSETRON 2 MG/ML
4 INJECTION INTRAMUSCULAR; INTRAVENOUS EVERY 8 HOURS PRN
Status: CANCELLED
Start: 2023-12-19

## 2023-12-19 RX ORDER — PROMETHAZINE HYDROCHLORIDE 25 MG/1
25 TABLET ORAL EVERY 8 HOURS PRN
Qty: 30 TABLET | Refills: 1 | Status: SHIPPED | OUTPATIENT
Start: 2023-12-19 | End: 2024-02-20

## 2023-12-19 NOTE — PROGRESS NOTES
"  Assessment:   23 yr old   SIUP at 9 3/7 weeks  Hyperemesis Gravidarum with small amount of weight loss  Reaction to IVF containing Infuvite: allergic reaction vs. Vasovagal reaction    Plan:   Discussed continued/ongoing management of nausea and vomiting.  Instructed to continue current medication regimen with the following changes: promethazine NV changed to promethazine 25mg PO BID.  New rx faxed to pharmacy.    Continue IV rehydration twice weekly.  Therapy plan modified to DISCONTINUE Infuvite, and initiate IVF LR 1,000 mL with 4mg IV zofran.    Encouraged continued lifestyle and diet modification to minimize symptoms, stay hydrated and maintain weight.    Hydration orally encouraged to 60 oz fluid per day if able to tolerate.    Instructed Litzy to continue/extend her IVY from job 2/2 inability to stabilize on current medication, with a need to demonstrate stability prior to return to work.  Letter provided to support extending IVY through 23.    Litzy is to return to clinic on 23 for IOB visit, weight check, UA/hydration evaluation, and assessment of ability to return to work.    Danger s/sx reviewed with Litzy.   Aware of CNM on call contact information.    LISA Shah, GREG  35 minutes on the date of the encounter doing chart review, review of outside records, review of test results, interpretation of tests, patient visit, and documentation.  Review time on ER visit, IV infusion treatments and recommendations.        Subjective:   Litzy Hatch is a 23 year old female who presents for follow up of hyperemesis.  She is a  currently at 9 3/7 weeks pregnancy.  She has had multiple ER visits and clinic visits trying to manage her nausea and vomiting.  She feels she is in a \"better\" place now, with the vomiting fairly well controlled (vomits every other day) but still experiences debilitating nausea.  Her current medication regimen includes:  AM: B6, Reglan, " Synthroid, Zofran, promethazine SC  PM: B6, Reglan, Unsiom, Zofran, promethazine SC  At Meals: B6, Reglan  She is also having IV infusions twice weekly.  During her last infusion, she experienced syncope, tinnitus and SOB immediately with initiation of infusion.  Unclear etiology: syncopal episode vs. Allergy to Infusion ingredients. Was treated and released from ER.  Discussed avoiding that infusion again, and will keep on chart as potential allergy.  She did not experience any rash or hives during the episode.  She would prefer receiving IV fluids with zofran only.  Therapy plan and orders modified to reflect new IV infusion.   She is struggling with severe constipation, does not that IV infusion has helped with that.  She is requesting Promethazine orally instead of SC.   She is taking in about 30 oz of fluid per day, and notes her urine to be dark in color.  UA collected today to evaluate hydration.  She did have an ultrasound on 12/11/23 at 8 weeks 4 days, confirming EDC of 7/18/24.  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  She has been on IVY from work due to the severity of her symptoms.  She reports a history of hyperemesis with her first pregnancy, which resolved around 14 weeks.  She is scheduled for her IOB and follow up visit on 12/28/23.    Review of Systems  Pertinent items are noted in HPI.      Objective:   Weight Hx:  11/29/23 157 lbs  12/18/23 156 lbs 11oz    BP 98/56  Pulse 82    Laboratory:   UA:  Negative for ketones  Normal UA results

## 2023-12-28 ENCOUNTER — LAB (OUTPATIENT)
Dept: LAB | Facility: CLINIC | Age: 23
End: 2023-12-28
Payer: COMMERCIAL

## 2023-12-28 ENCOUNTER — PRENATAL OFFICE VISIT (OUTPATIENT)
Dept: MIDWIFE SERVICES | Facility: CLINIC | Age: 23
End: 2023-12-28
Payer: COMMERCIAL

## 2023-12-28 VITALS
WEIGHT: 159.2 LBS | HEIGHT: 68 IN | HEART RATE: 84 BPM | BODY MASS INDEX: 24.13 KG/M2 | DIASTOLIC BLOOD PRESSURE: 66 MMHG | SYSTOLIC BLOOD PRESSURE: 100 MMHG

## 2023-12-28 DIAGNOSIS — E03.9 HYPOTHYROIDISM, UNSPECIFIED TYPE: ICD-10-CM

## 2023-12-28 DIAGNOSIS — Z34.81 ENCOUNTER FOR SUPERVISION OF OTHER NORMAL PREGNANCY IN FIRST TRIMESTER: Primary | ICD-10-CM

## 2023-12-28 DIAGNOSIS — Z34.81 ENCOUNTER FOR SUPERVISION OF OTHER NORMAL PREGNANCY IN FIRST TRIMESTER: ICD-10-CM

## 2023-12-28 DIAGNOSIS — Z20.9 EXPOSURE TO POTENTIAL INFECTION: ICD-10-CM

## 2023-12-28 DIAGNOSIS — M25.551 HIP PAIN, RIGHT: ICD-10-CM

## 2023-12-28 PROBLEM — Z00.00 ENCOUNTER FOR PREVENTIVE CARE: Status: RESOLVED | Noted: 2023-01-10 | Resolved: 2023-12-28

## 2023-12-28 PROBLEM — L70.0 ACNE VULGARIS: Status: RESOLVED | Noted: 2021-03-11 | Resolved: 2023-12-28

## 2023-12-28 PROBLEM — K63.8219 SMALL INTESTINAL BACTERIAL OVERGROWTH: Status: RESOLVED | Noted: 2021-03-11 | Resolved: 2023-12-28

## 2023-12-28 LAB
ABO/RH(D): NORMAL
ALBUMIN UR-MCNC: NEGATIVE MG/DL
ANTIBODY SCREEN: NEGATIVE
APPEARANCE UR: CLEAR
BASOPHILS # BLD AUTO: 0 10E3/UL (ref 0–0.2)
BASOPHILS NFR BLD AUTO: 0 %
BILIRUB UR QL STRIP: NEGATIVE
COLOR UR AUTO: YELLOW
EOSINOPHIL # BLD AUTO: 0.1 10E3/UL (ref 0–0.7)
EOSINOPHIL NFR BLD AUTO: 1 %
ERYTHROCYTE [DISTWIDTH] IN BLOOD BY AUTOMATED COUNT: 13.1 % (ref 10–15)
GLUCOSE UR STRIP-MCNC: NEGATIVE MG/DL
HCT VFR BLD AUTO: 34.9 % (ref 35–47)
HGB BLD-MCNC: 11.8 G/DL (ref 11.7–15.7)
HGB UR QL STRIP: NEGATIVE
IMM GRANULOCYTES # BLD: 0 10E3/UL
IMM GRANULOCYTES NFR BLD: 0 %
KETONES UR STRIP-MCNC: NEGATIVE MG/DL
LEUKOCYTE ESTERASE UR QL STRIP: ABNORMAL
LYMPHOCYTES # BLD AUTO: 2 10E3/UL (ref 0.8–5.3)
LYMPHOCYTES NFR BLD AUTO: 19 %
MCH RBC QN AUTO: 28.4 PG (ref 26.5–33)
MCHC RBC AUTO-ENTMCNC: 33.8 G/DL (ref 31.5–36.5)
MCV RBC AUTO: 84 FL (ref 78–100)
MONOCYTES # BLD AUTO: 0.6 10E3/UL (ref 0–1.3)
MONOCYTES NFR BLD AUTO: 6 %
NEUTROPHILS # BLD AUTO: 7.6 10E3/UL (ref 1.6–8.3)
NEUTROPHILS NFR BLD AUTO: 74 %
NITRATE UR QL: NEGATIVE
NRBC # BLD AUTO: 0 10E3/UL
NRBC BLD AUTO-RTO: 0 /100
PH UR STRIP: 7 [PH] (ref 5–8)
PLATELET # BLD AUTO: 369 10E3/UL (ref 150–450)
RBC # BLD AUTO: 4.15 10E6/UL (ref 3.8–5.2)
SP GR UR STRIP: 1.02 (ref 1–1.03)
SPECIMEN EXPIRATION DATE: NORMAL
SPECIMEN EXPIRATION DATE: NORMAL
T4 FREE SERPL-MCNC: 1.25 NG/DL (ref 0.9–1.7)
TSH SERPL DL<=0.005 MIU/L-ACNC: 2.84 UIU/ML (ref 0.3–4.2)
UROBILINOGEN UR STRIP-ACNC: 0.2 E.U./DL
WBC # BLD AUTO: 10.4 10E3/UL (ref 4–11)

## 2023-12-28 PROCEDURE — 84443 ASSAY THYROID STIM HORMONE: CPT

## 2023-12-28 PROCEDURE — 87086 URINE CULTURE/COLONY COUNT: CPT | Performed by: ADVANCED PRACTICE MIDWIFE

## 2023-12-28 PROCEDURE — 86762 RUBELLA ANTIBODY: CPT

## 2023-12-28 PROCEDURE — 86803 HEPATITIS C AB TEST: CPT

## 2023-12-28 PROCEDURE — 36415 COLL VENOUS BLD VENIPUNCTURE: CPT

## 2023-12-28 PROCEDURE — 85025 COMPLETE CBC W/AUTO DIFF WBC: CPT

## 2023-12-28 PROCEDURE — 87088 URINE BACTERIA CULTURE: CPT | Performed by: ADVANCED PRACTICE MIDWIFE

## 2023-12-28 PROCEDURE — 99215 OFFICE O/P EST HI 40 MIN: CPT | Performed by: ADVANCED PRACTICE MIDWIFE

## 2023-12-28 PROCEDURE — 86900 BLOOD TYPING SEROLOGIC ABO: CPT

## 2023-12-28 PROCEDURE — 86645 CMV ANTIBODY IGM: CPT

## 2023-12-28 PROCEDURE — 86780 TREPONEMA PALLIDUM: CPT

## 2023-12-28 PROCEDURE — G0145 SCR C/V CYTO,THINLAYER,RESCR: HCPCS | Performed by: ADVANCED PRACTICE MIDWIFE

## 2023-12-28 PROCEDURE — 83655 ASSAY OF LEAD: CPT

## 2023-12-28 PROCEDURE — 86850 RBC ANTIBODY SCREEN: CPT

## 2023-12-28 PROCEDURE — 87389 HIV-1 AG W/HIV-1&-2 AB AG IA: CPT

## 2023-12-28 PROCEDURE — 81003 URINALYSIS AUTO W/O SCOPE: CPT | Performed by: ADVANCED PRACTICE MIDWIFE

## 2023-12-28 PROCEDURE — 84439 ASSAY OF FREE THYROXINE: CPT

## 2023-12-28 PROCEDURE — 86644 CMV ANTIBODY: CPT

## 2023-12-28 PROCEDURE — 87340 HEPATITIS B SURFACE AG IA: CPT

## 2023-12-28 ASSESSMENT — EDINBURGH POSTNATAL DEPRESSION SCALE (EPDS)
I HAVE BEEN SO UNHAPPY THAT I HAVE HAD DIFFICULTY SLEEPING: NOT AT ALL
THINGS HAVE BEEN GETTING ON TOP OF ME: NO, MOST OF THE TIME I HAVE COPED QUITE WELL
I HAVE BEEN ANXIOUS OR WORRIED FOR NO GOOD REASON: NO, NOT AT ALL
I HAVE BLAMED MYSELF UNNECESSARILY WHEN THINGS WENT WRONG: NO, NEVER
I HAVE FELT SAD OR MISERABLE: NO, NOT AT ALL
I HAVE LOOKED FORWARD WITH ENJOYMENT TO THINGS: RATHER LESS THAN I USED TO
I HAVE BEEN SO UNHAPPY THAT I HAVE BEEN CRYING: ONLY OCCASIONALLY
I HAVE FELT SCARED OR PANICKY FOR NO GOOD REASON: YES, SOMETIMES
TOTAL SCORE: 5
I HAVE BEEN ABLE TO LAUGH AND SEE THE FUNNY SIDE OF THINGS: AS MUCH AS I ALWAYS COULD
THE THOUGHT OF HARMING MYSELF HAS OCCURRED TO ME: NEVER

## 2023-12-28 ASSESSMENT — ANXIETY QUESTIONNAIRES
GAD7 TOTAL SCORE: 4
2. NOT BEING ABLE TO STOP OR CONTROL WORRYING: NOT AT ALL
1. FEELING NERVOUS, ANXIOUS, OR ON EDGE: SEVERAL DAYS
5. BEING SO RESTLESS THAT IT IS HARD TO SIT STILL: NOT AT ALL
7. FEELING AFRAID AS IF SOMETHING AWFUL MIGHT HAPPEN: NOT AT ALL
3. WORRYING TOO MUCH ABOUT DIFFERENT THINGS: SEVERAL DAYS
IF YOU CHECKED OFF ANY PROBLEMS ON THIS QUESTIONNAIRE, HOW DIFFICULT HAVE THESE PROBLEMS MADE IT FOR YOU TO DO YOUR WORK, TAKE CARE OF THINGS AT HOME, OR GET ALONG WITH OTHER PEOPLE: SOMEWHAT DIFFICULT
6. BECOMING EASILY ANNOYED OR IRRITABLE: SEVERAL DAYS

## 2023-12-28 ASSESSMENT — PATIENT HEALTH QUESTIONNAIRE - PHQ9: 5. POOR APPETITE OR OVEREATING: SEVERAL DAYS
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
## 2023-12-28 NOTE — PATIENT INSTRUCTIONS
"\"We hope you had a positive experience and that you can definitely recommend [facility] to your family and friends. You ll be receiving a survey soon and we look forward to hearing your feedback\".    Welcome to [facility]   and thank you for choosing us for your maternity care provider!  Congratulations!      [facility]  After each of your visits you are welcome to check [employer] for your visit summary including education and links to information relevant to your pregnancy and/or well woman care.   Find the \"Visits\" tab at the top of the page, you will see a list of recent visits and for each visit a for link for \"View After Visit Summary.\" View of your After Visit Summary will allow you to read our recommendations from your visit, review any education provided, and link to websites with useful information.   If you have any questions or difficulty navigating [place], please feel free to contact us and we will do our best to direct you.  Meet the Midwives from [facility]  You are invited to an informational meet and greet with [facility] Certified Nurse-Midwives. Our free \"Meet the Midwives\" event is a great opportunity to learn about our midwives' philosophy and experience, the hospitals where we can assist with your birth, and answer questions you may have. Partners, friends, and family are welcome to attend. Currently, this is a virtual event.  Date  Last Thursday of every month at 7 pm.    Link to next (live) meeting   [email or web address]-the-[email or web address]  To Join by Telephone (audio only) Call:   [phone] Phone Conference ID: [phone] #    Contact information:  Appointment line and to get a hold of CNM in clinic Monday-Friday 8 am - 5 pm:  [phone].  There are some clinics with early start times (1st appointment 7:40 am) and others with evening hours (last appointment 6:20 pm).  Most are typically open from 8 " am to 5 pm.    CNM on call answering service: [phone].  Specify your hospital of choice and leave a brief message for CNM;  will then page CNM who is on call at your specified hospital and you should receive a call back with 15 minutes.  Be sure that your ringer is audible and that you can accept blocked calls so that we can get back in touch with you! This number should be reserved for urgent needs if during the day, before 8 am, after 5 pm, weekends, holidays.      We support all families in their infant feeding journey. We'll provide education on Breastfeeding/Chestfeeding early and often to help you achieve your goals. Please let us know if you have questions along the way!      Breastfeeding: a Healthy Option for You and Your Baby  Consider breastfeeding for the healthiest way to feed your baby. Ask your midwife or physician for more information.     The choice of how you will feed your baby is important.  Before your baby s birth, you ll want to learn about the benefits of breastfeeding.  [facility] ([place] and [facility]) continue to support Baby Friendly standards; an initiative that was created by the World Health Organization and UNICEF.  This helps give you and your baby the best start in feeding their baby.    Why should I breastfeed my baby?   Babies are less likely to develop childhood obesity or diabetes   Babies are less likely to suffer from recurrent ear infections   Babies are less likely to be hospitalized for respiratory conditions   Breast milk is rich in nutrients and antibodies-it is easy to digest    How does it benefit me?   Lowers the risk for diabetes, breast and ovarian cancer and postpartum depression   Moms can lose  baby weight  more quickly   Cost savings - formula can cost well over $1,500 per year   Convenient - no bottles and nipples to sterilize, no measuring and mixing formula   The physical contact with  breastfeeding can make babies feel secure, warm and comforted     What about formula?  While you and your baby are staying with us at [facility], we will support whatever feeding choice you make for your baby.    Some important considerations:    The American Academy of Pediatrics, the World Health Organization, and many more organizations recommend exclusive breastfeeding for 6 months and continued breastfeeding while adding other foods for the first 1-2 years.    Any amount of breastmilk has benefits to both baby and mother.  Giving formula in replacement of breastfeeding can affect mother s milk supply.  If formula is needed, hospital staff will work with you on a plan to help develop your milk supply.  Formula alters the natural growth of good bacteria in the  stomach.   Research has found that first time mothers who offer formula in the hospital have a shorter duration of breastfeeding.    How can I start to prepare?   Start by having a conversation with your medical provider.   Talk with your partner, family and friends.   Attend a prenatal class that includes breastfeeding preparation. Birth and breastfeeding classes are offered by [employer]. Visit MitoProd for class information.   After your baby s birth, hospital staff and lactation consultants will help you and your baby get off to a great start with breastfeeding.      RESOURCES  [facility] Maternity Care:   https://[facility].org/locations/the-birthplace-atThe [facility] Maternity Care:   https://[facility].org/locations/the-birthplace-atThe [facility]    Scroll to the bottom of this page if the above link does not work      Breastfeeding:    OUTPATIENT LACTATION RESOURCES     -Schedule an appointment with a [facility] GREG who is also a Lactation Consultant by calling [phone]. We see women for  breastfeeding visits at [facility].     Chocolate Milk Club:  http://www.BVfon TelecommunicationvesselsmidwiferPrivileged World Travel Club.com/chocolate-milk-club/  Join the Facebook group or join us for support on the first Monday of each month from 7 to 9 p.m.  , Dr. Radha Chang, DNP, CNM, CNP, IBCLC, ICEA  Phone: [phone]  Fax: [phone]  Email: [email or web address]    R.O.S.E. = Reaching our Sisters Everywhere  Http://www.breastfeedingrose.org/    Black Women Do Breastfeed Blog  www.blackwomendobreastfeed.org    Club Mom breastfeeding support for Black mothers:  Contact Kenan Suarez  Phone: [phone]   Email:  [email or web address].     Heather Caballero  Phone: [phone]   Email:  [email or web address]@[email or web address]    Club Dad parent support for Black fathers:   Contact Urbano Wyatt   Phone: [phone]   Email:  Issac@[facility]    The ong Breastfeeding Coalition is a wonderful support for [facility] women who are breastfeeding.  They are best found on Facebook.      The Hmong Breastfeeding Coalition has produced a collection of video stories about breastfeeding in the [facility] community, produced by the Hmong Breastfeeding Coalition.  Most are in English, but one on handling breastmilk is in ong.  The video collection is in the middle of the page.    This page also has several other resources on ong breastfeeding.     https://mnbreastfeedingcoalition.org/communities/    WIC program application: Storm Arenas   Https://www.youtube.com/watch?v=lQoWwPoyGYc    For information on Local WIC services call:  [phone]    You may qualify...  If you are pregnant, nursing, or have a child under age 5, we encourage you to Apply for WIC.    WIC Provides...  Nutrition tips and advice  Support for breastfeeding  Healthy foods like fresh fruits and vegetables, whole grain cereals, bread and tortillas, low fat milk, and baby foods  Caring and  supportive staff  Don't delay! We're here to help!  CALL TODAY FOR A WIC CLINIC NEAR YOU  3-184-NIJ-4519 or 1-557.345.2317     New Parent Connection:   Pao [place], [address] [facility]  In-person meetings on  from 6 pm - 7:15 pm for parents of  to 9 months, at the same site.   All are free, drop-in, no registration required.    There are also free virtual meetings ongoing on :  11:30 am - 12:30 pm for parents of newborns to 3 months  4:15 pm to 5:15 pm for parents of 3 to 9-month olds  For joining info parents should call Ivanna Jimenez at [phone]    -Baby Café  Find a Baby Café - Baby Café [employer] (babycafeusa.org)   Search by State ([place]) to find the nearest cafe to you      [facility] Baby Café  Due to COVID-19, all Baby Café sessions are canceled until further notice. For lactation support, please contact one of our bilingual staff:  Gabriela (IBCLC) [phone]  Marianna (IBCLC/ Austrian) [phone]  Zoua (Hmong) [phone]  Jayden (Trinidadian) [phone]  Baby Café is a free, drop-in service offering breastfeeding/chestfeeding support. Come share tips and socialize with other pregnant, breastfeeding/chestfeeding families. Babies and siblings are welcome (no  available).  We offer:  Professionally trained lactation staff.  Resource books for lending.  Relaxed and fun atmosphere.  Refreshments.   More information  Marianna Epps  [phone]  [email or web address]     -Attend a baby weigh in at [facility].  Lactation consultants are available to answer questions  Jeanie:  1:00 - 2:00  [facility]:  1:00 - 2:00   www.[facility] HospitalPhoenix Enterprise Computing Serviceser.NearVerse    -Attend one of the New Mama groups at [facility] in [facility].  [facility] also offers one-on-one in home and in office lactation consults.   www.[facility].com    -Attend a LeLeche League meeting.  Multiple groups in several locations throughout the [facility]. The  meetings are no-cost and always informative breastfeeding education session through [employer] La Leche League  Www.irvin.org/     Held at [facility] the second Thursday of each month at 7pm    Childbirth and Parenting Education:     Everyday Miracles:   https://www.everyday-miracles.org/    Free Video Series from [facility]: https://nursing.[facility]/academics/specialty-areas/nurse-midwifery/having-baby-prenatal-videos/having-baby-prenatal-and    Childbirth Education virtual (live) classes: www.[employer]/classes  The Birth Hour: https://Narvar/online-childbirth-class/  BirthED: https://www.birthedmn.com/  LILLIE parenting center: http://amMunson Healthcare Cadillac Hospitalpg40 Consulting Group/   (422) 918-DZWM  Blooma: (education, yoga & wellness) www.[employer]  Enlightened Mama: www.KZO Innovationsenedmama.The Ultimate Relocation Network   Childbirth collective: (Parent topic nights)  www.childbirthcollective.org/  Hypnobabies:  www.hypnobabiestPowermat Technologies.The Ultimate Relocation Network/  Hypnobirthing:  Http://Go2call.com/  Hypnobirthing virtual class: www."[employer]/hypnobirthing    Information about doulas:  Childbirth collective: http://www.childbirthcollective.org/  Doulas of [place] (NUBIA):  www.nubia.org  [facility]  project: http://twincitiesdoulaproject.com/     Early Childhood and Family Education (ECFE):  ECFE offers parents hands-on learning experiences that will nourish a lifetime of teachable moments.  http://ecfe.info/ecfe-home/    APPS and Podcasts:   Emeli Max Nurture    Evidence Based Birth  The Birth Hour (for birth stories)   Birthful   Expectful   The Longest Shortest Time  PregnancyPodcast Gina Hurst    Book Recommendations:   Lynne Juli's Birthing From Within--first few chapters include a new-age tone, you may prefer to skip it and keep going, because there is good stuff later.  This book recommendation covers emotional preparation, but does cover coping with pain, and use of both pharmacological and  "nonpharmacological methods.    Guide to Childbirth by Kamilah Jha  Childbirth Without Fear by Marco A Montanez Read    Dr. Penaloza' The Pregnancy Book and The Birth Book--the pregnancy book goes month-by month    The Birth Partner by Francisca Estrella    Womanly Art of Breastfeeding by La Leche League International   Bestfeeding by Muriel Vickers--great pictures    Mothering Your Nursing Toddler, by Sylvia Zaldivar.   Addresses dealing with so many of the challenging behaviors of a nursing toddler.  How Weaning Happens, by La Leche League.  Discusses weaning at all ages, from medically necessary weaning of an infant, all the way up to age 5 (or older), with why/why not, and strategies.  Very empowering book both for deciding to wean and deciding not to.    American College of Nurse-Midwives (ACNM) http://www.midwife.org/; look at the informational handouts at http://www.midwife.org/Share-With-Women     www.mymidwife.org    Mother to Baby (Medication and Herbal guidance in pregnancy): http://www.mothertobaby.org  Toll-Free Hotline: 671.920.7049  LactMed (Medication use while breastfeeding): http://toxnet.nlm.nih.gov/newtoxnet/lactmed.htm    Women's Health.gov:  http://www.womenshealth.gov/a-z-topics/index.html    American pregnancy association - http://americanpregnancy.org    Centering Pregnancy (group prenatal care option): http://centeringhealthcare.org    March of Dimes www.Fileblaze.com     FDA - Nutrition  www.mypyramid.gov  Under \"For Consumers,\" click on \"pregnant and breastfeeding women.\"      Centers for Disease Control and Prevention (CDC) - Vaccines : http://www.cdc.gov/vaccines/       When researching information on the web, question the validity of websites.  The domains .gov, .edu and.org tend to be more reliable information.  If there are a lot of advertisements, be cautious of the information provided. Stay away from blogs and chat rooms please!     "

## 2023-12-28 NOTE — LETTER
December 28, 2023      Litzy Hatch  [address] [place] [address] [address] [place] [address]        To Whom It May Concern:    Litzy Hatch was seen in our clinic. It is my recommendation that she remain off of work until 1/8/2024.  At that time she can return to full duty.  Please feel free to reach out with any questions or concerns.      Sincerely,        Olivia Cyr CNM

## 2023-12-29 LAB
CMV IGG SERPL IA-ACNC: 3.8 U/ML
CMV IGG SERPL IA-ACNC: ABNORMAL
CMV IGM SERPL IA-ACNC: <8 AU/ML
CMV IGM SERPL IA-ACNC: NEGATIVE
HBV SURFACE AG SERPL QL IA: NONREACTIVE
HCV AB SERPL QL IA: NONREACTIVE
HIV 1+2 AB+HIV1 P24 AG SERPL QL IA: NONREACTIVE
RUBV IGG SERPL QL IA: 1.42 INDEX
RUBV IGG SERPL QL IA: POSITIVE
T PALLIDUM AB SER QL: NONREACTIVE

## 2023-12-29 ASSESSMENT — ANXIETY QUESTIONNAIRES: GAD7 TOTAL SCORE: 4

## 2023-12-30 PROBLEM — R82.71 GBS BACTERIURIA: Status: ACTIVE | Noted: 2023-12-30

## 2023-12-31 LAB
BACTERIA UR CULT: ABNORMAL
BACTERIA UR CULT: ABNORMAL
LEAD BLDV-MCNC: <2 UG/DL

## 2024-01-03 LAB
BKR LAB AP GYN ADEQUACY: NORMAL
BKR LAB AP GYN INTERPRETATION: NORMAL
BKR LAB AP HPV REFLEX: NORMAL
BKR LAB AP LMP: NORMAL
BKR LAB AP PREVIOUS ABNORMAL: NORMAL
PATH REPORT.COMMENTS IMP SPEC: NORMAL
PATH REPORT.COMMENTS IMP SPEC: NORMAL
PATH REPORT.RELEVANT HX SPEC: NORMAL

## 2024-01-04 ENCOUNTER — MYC MEDICAL ADVICE (OUTPATIENT)
Dept: MIDWIFE SERVICES | Facility: CLINIC | Age: 24
End: 2024-01-04
Payer: COMMERCIAL

## 2024-01-08 ENCOUNTER — TELEPHONE (OUTPATIENT)
Dept: NEUROLOGY | Facility: CLINIC | Age: 24
End: 2024-01-08
Payer: COMMERCIAL

## 2024-01-08 NOTE — TELEPHONE ENCOUNTER
Patient Contacted to schedule the following:    Appointment type: Return Headache  Provider: Chuy  Return date: Around 8/7/24  Specialty phone number: [phone]  Additional appointment(s) needed: N/A  Additional Notes: N/A    Spoke with patient, scheduled on 8/26 at 11:30am with Dr. Vera [place] [facility]     Naif Gauthier on 1/8/2024 at 10:32 AM

## 2024-01-09 ENCOUNTER — PRENATAL OFFICE VISIT (OUTPATIENT)
Dept: MIDWIFE SERVICES | Facility: CLINIC | Age: 24
End: 2024-01-09
Payer: COMMERCIAL

## 2024-01-09 VITALS
SYSTOLIC BLOOD PRESSURE: 86 MMHG | WEIGHT: 159.5 LBS | DIASTOLIC BLOOD PRESSURE: 60 MMHG | HEIGHT: 68 IN | HEART RATE: 60 BPM | BODY MASS INDEX: 24.17 KG/M2

## 2024-01-09 DIAGNOSIS — Z34.80 SUPERVISION OF OTHER NORMAL PREGNANCY, ANTEPARTUM: Primary | ICD-10-CM

## 2024-01-09 PROCEDURE — 99207 PR PRENATAL VISIT: CPT | Performed by: MIDWIFE

## 2024-01-10 NOTE — PATIENT INSTRUCTIONS
"\"We hope you had a positive experience and that you can definitely recommend [facility] to your family and friends. You ll be receiving a survey soon and we look forward to hearing your feedback\".    Welcome to [facility]   and thank you for choosing us for your maternity care provider!  Congratulations!      RF Codehart  After each of your visits you are welcome to check [employer] for your visit summary including education and links to information relevant to your pregnancy and/or well woman care.   Find the \"Visits\" tab at the top of the page, you will see a list of recent visits and for each visit a for link for \"View After Visit Summary.\" View of your After Visit Summary will allow you to read our recommendations from your visit, review any education provided, and link to websites with useful information.   If you have any questions or difficulty navigating [employer], please feel free to contact us and we will do our best to direct you.  Meet the Midwives from [facility]  You are invited to an informational meet and greet with [facility]'s [facility] Certified Nurse-Midwives. Our free \"Meet the Midwives\" event is a great opportunity to learn about our midwives' philosophy and experience, the hospitals where we can assist with your birth, and answer questions you may have. Partners, friends, and family are welcome to attend. Currently, this is a virtual event.  Date  Last Thursday of every month at 7 pm.    Link to next (live) meeting   [email or web address]  To Join by Telephone (audio only) Call:   [phone] Phone Conference ID: [phone] #    Contact information:  Appointment line and to get a hold of CNM in clinic Monday-Friday 8 am - 5 pm:  [phone].  There are some clinics with early start times (1st appointment 7:40 am) and others with evening hours (last appointment 6:20 pm).  Most are typically open from 8 " am to 5 pm.    CNM on call answering service: [phone].  Specify your hospital of choice and leave a brief message for CNM;  will then page CNM who is on call at your specified hospital and you should receive a call back with 15 minutes.  Be sure that your ringer is audible and that you can accept blocked calls so that we can get back in touch with you! This number should be reserved for urgent needs if during the day, before 8 am, after 5 pm, weekends, holidays.      We support all families in their infant feeding journey. We'll provide education on Breastfeeding/Chestfeeding early and often to help you achieve your goals. Please let us know if you have questions along the way!      Breastfeeding: a Healthy Option for You and Your Baby  Consider breastfeeding for the healthiest way to feed your baby. Ask your midwife or physician for more information.     The choice of how you will feed your baby is important.  Before your baby s birth, you ll want to learn about the benefits of breastfeeding.  [facility]) continue to support Baby Friendly standards; an initiative that was created by the World Health Organization and UNICEF.  This helps give you and your baby the best start in feeding their baby.    Why should I breastfeed my baby?   Babies are less likely to develop childhood obesity or diabetes   Babies are less likely to suffer from recurrent ear infections   Babies are less likely to be hospitalized for respiratory conditions   Breast milk is rich in nutrients and antibodies-it is easy to digest    How does it benefit me?   Lowers the risk for diabetes, breast and ovarian cancer and postpartum depression   Moms can lose  baby weight  more quickly   Cost savings - formula can cost well over $1,500 per year   Convenient - no bottles and nipples to sterilize, no measuring and mixing formula   The physical contact with  breastfeeding can make babies feel secure, warm and comforted     What about formula?  While you and your baby are staying with us at [facility], we will support whatever feeding choice you make for your baby.    Some important considerations:    The American Academy of Pediatrics, the World Health Organization, and many more organizations recommend exclusive breastfeeding for 6 months and continued breastfeeding while adding other foods for the first 1-2 years.    Any amount of breastmilk has benefits to both baby and mother.  Giving formula in replacement of breastfeeding can affect mother s milk supply.  If formula is needed, hospital staff will work with you on a plan to help develop your milk supply.  Formula alters the natural growth of good bacteria in the  stomach.   Research has found that first time mothers who offer formula in the hospital have a shorter duration of breastfeeding.    How can I start to prepare?   Start by having a conversation with your medical provider.   Talk with your partner, family and friends.   Attend a prenatal class that includes breastfeeding preparation. Birth and breastfeeding classes are offered by [employer]. Visit [employer] for class information.   After your baby s birth, hospital staff and lactation consultants will help you and your baby get off to a great start with breastfeeding.      RESOURCES  [facility] Maternity Care:   https://[facility].org/locations/the-birthplace-at[facility] M[facility] Care:   https://[facility].org/locations/the-birthplace-at[facility]    Scroll to the bottom of this page if the above link does not work      Breastfeeding:    OUTPATIENT LACTATION RESOURCES     -Schedule an appointment with a [facility] Nurse [facility]wives [facility] GREG who is also a Lactation Consultant by calling [phone]. We see women for  breastfeeding visits at [facility].     Chocolate Milk Club:  http://www.Kustom CodesvesselsmidwiferMytonomy.com/chocolate-milk-club/  Join the Facebook group or join us for support on the first Monday of each month from 7 to 9 p.m.  , Dr. Radha Chang, DNP, CNM, CNP, IBCLC, ICEA  Phone: [phone]  Fax: [phone]  Email: [email or web address]    R.O.S.E. = Reaching our Sisters Everywhere  Http://www.breastfeedingrose.org/    Black Women Do Breastfeed Blog  www.blackwomendobreastfeed.org    Club Mom breastfeeding support for Black mothers:  Contact Kenan Suarez  Phone: [phone]   Email:  [email or web address].     Heather Caballero  Phone: [phone]   Email:  [email or web address]    Club Dad parent support for Black fathers:   Contact Urbano Wyatt   Phone: [phone]   Email:  [email or web address]    The ong Breastfeeding Coalition is a wonderful support for [facility] women who are breastfeeding.  They are best found on Facebook.      The Hmong Breastfeeding Coalition has produced a collection of video stories about breastfeeding in the [facility] community, produced by the Hmong Breastfeeding Coalition.  Most are in English, but one on handling breastmilk is in ong.  The video collection is in the middle of the page.    This page also has several other resources on ong breastfeeding.     https://mnbreastfeedingcoalition.org/communities/    WIC program application: Storm Arenas   Https://www.youtube.com/watch?v=lQoWwPoyGYc    For information on Local WIC services call:  [phone]    You may qualify...  If you are pregnant, nursing, or have a child under age 5, we encourage you to Apply for WIC.    WIC Provides...  Nutrition tips and advice  Support for breastfeeding  Healthy foods like fresh fruits and vegetables, whole grain cereals, bread and tortillas, low fat milk, and baby foods  Caring and  supportive staff  Don't delay! We're here to help!  CALL TODAY FOR A WIC CLINIC NEAR YOU  0-694-BEX-9976 or [phone]     New Parent Connection:   [place]ao [place], [address] [facility]  In-person meetings on  from 6 pm - 7:15 pm for parents of  to 9 months, at the same site.   All are free, drop-in, no registration required.    There are also free virtual meetings ongoing on :  11:30 am - 12:30 pm for parents of newborns to 3 months  4:15 pm to 5:15 pm for parents of 3 to 9-month olds  For joining info parents should call Ivanna Jimenez at [phone]    -Baby Café  Find a Baby Café - Baby Café [place] (babycafeusa.org)   Search by State ([place]) to find the nearest cafe to you      [facility] Baby Café  Due to COVID-19, all Baby Café sessions are canceled until further notice. For lactation support, please contact one of our bilingual staff:  Gabriela (IBCLC) [phone]  Marianna (IBCLC/ Icelandic) [phone]  Zoua (Hmong) [phone]  Jayden (Belgian) [phone]  Baby Café is a free, drop-in service offering breastfeeding/chestfeeding support. Come share tips and socialize with other pregnant, breastfeeding/chestfeeding families. Babies and siblings are welcome (no  available).  We offer:  Professionally trained lactation staff.  Resource books for lending.  Relaxed and fun atmosphere.  Refreshments.   More information  Marianna Epps  [phone]  [email or web address]     -Attend a baby weigh in at [facility].  Lactation consultants are available to answer questions  Jeanie:  1:00 - 2:00  [facility]:  1:00 - 2:00   www.[facility] HospitalColl[facility]ate Clouder.WiFast    -Attend one of the New Mama groups at [facility] in [facility].  [facility] also offers one-on-one in home and in office lactation consults.   www.[facility].com    -Attend a LeLeche League meeting.  Multiple groups in several locations throughout the [facility]. The  meetings are no-cost and always informative breastfeeding education session through [employer] La Leche League  Www.irvin.org/     Held at [facility] the second Thursday of each month at 7pm    Childbirth and Parenting Education:     Everyday Miracles:   https://www.everyday-miracles.org/    Free Video Series from [facility]: https://nursing.[facility]/academics/specialty-areas/nurse-midwifery/having-baby-prenatal-videos/having-baby-prenatal-and    Childbirth Education virtual (live) classes: www.Stylefinch/classes  The Birth Hour: https://[employer]/online-childbirth-class/  BirthED: https://www.birthedmn.com/  LILLIE parenting center: http://amMy[facility] Medical Center AlmaMailsuite/   (776) 564-UCVQ  Blooma: (education, yoga & wellness) www.Expan  Enlightened Mama: www.Jaegerenedmama.[employer]   Childbirth collective: (Parent topic nights)  www.childbirthcollective.org/  Hypnobabies:  www.hypnobabiestDollar Shave Club.[employer]/  Hypnobirthing:  Http://RegenaStem/  Hypnobirthing virtual class: www.[employer]/hypnobirthing    Information about doulas:  Childbirth collective: http://www.childbirthcollective.org/  Doulas of [place] (NUBIA):  www.nubia.org  [facility]  project: http://twincitiesdoulaproject.com/     Early Childhood and Family Education (ECFE):  ECFE offers parents hands-on learning experiences that will nourish a lifetime of teachable moments.  http://ecfe.info/ecfe-home/    APPS and Podcasts:   Emeli Max Nurture    Evidence Based Birth  The Birth Hour (for birth stories)   Birthful   Expectful   The Longest Shortest Time  PregnancyPodcast Gina Hurst    Book Recommendations:   Lynne Juli's Birthing From Within--first few chapters include a new-age tone, you may prefer to skip it and keep going, because there is good stuff later.  This book recommendation covers emotional preparation, but does cover coping with pain, and use of both pharmacological and  "nonpharmacological methods.    Guide to Childbirth by Kamilah Jha  Childbirth Without Fear by Marco A Montanez Read    Dr. Penaloza' The Pregnancy Book and The Birth Book--the pregnancy book goes month-by month    The Birth Partner by Francisca Estrella    Womanly Art of Breastfeeding by La Leche League International   Bestfeeding by Muriel Vickers--great pictures    Mothering Your Nursing Toddler, by Sylvia Zaldivar.   Addresses dealing with so many of the challenging behaviors of a nursing toddler.  How Weaning Happens, by La Leche League.  Discusses weaning at all ages, from medically necessary weaning of an infant, all the way up to age 5 (or older), with why/why not, and strategies.  Very empowering book both for deciding to wean and deciding not to.    American College of Nurse-Midwives (ACNM) http://www.midwife.org/; look at the informational handouts at http://www.midwife.org/Share-With-Women     www.mymidwife.org    Mother to Baby (Medication and Herbal guidance in pregnancy): http://www.mothertobaby.org  Toll-Free Hotline: 304.653.5055  LactMed (Medication use while breastfeeding): http://toxnet.nlm.nih.gov/newtoxnet/lactmed.htm    Women's Health.gov:  http://www.womenshealth.gov/a-z-topics/index.html    American pregnancy association - http://americanpregnancy.org    Centering Pregnancy (group prenatal care option): http://centeringhealthcare.org    March of Dimes www.[employer].com     FDA - Nutrition  www.mypyramid.gov  Under \"For Consumers,\" click on \"pregnant and breastfeeding women.\"      Centers for Disease Control and Prevention (CDC) - Vaccines : http://www.cdc.gov/vaccines/       When researching information on the web, question the validity of websites.  The domains .gov, .edu and.org tend to be more reliable information.  If there are a lot of advertisements, be cautious of the information provided. Stay away from blogs and chat rooms please!   "

## 2024-01-10 NOTE — PROGRESS NOTES
Irma is here for a follow up visit at 12w2d. Has been feeling too ill to work, run down. Asking for workability forms to be filled out. Manager at work has light duty opportunity that she thinks she may be able to do. Re evaluate at next visit for return to work forms. Unable to hear FHT with Jonathan today, offered follow up ultrasound or return visit next week to try again with Jonathan, pt will come back next week. Order for ultrasound placed on chart in case she decides to do that also. Has number for on call midwife and knows to call prn.

## 2024-01-12 ENCOUNTER — HOSPITAL ENCOUNTER (OUTPATIENT)
Dept: ULTRASOUND IMAGING | Facility: CLINIC | Age: 24
Discharge: HOME | End: 2024-01-12
Attending: ADVANCED PRACTICE MIDWIFE | Admitting: ADVANCED PRACTICE MIDWIFE
Payer: COMMERCIAL

## 2024-01-12 ENCOUNTER — PRENATAL OFFICE VISIT (OUTPATIENT)
Dept: MIDWIFE SERVICES | Facility: CLINIC | Age: 24
End: 2024-01-12
Payer: COMMERCIAL

## 2024-01-12 ENCOUNTER — DOCUMENTATION ONLY (OUTPATIENT)
Dept: MIDWIFE SERVICES | Facility: CLINIC | Age: 24
End: 2024-01-12

## 2024-01-12 VITALS
WEIGHT: 158.3 LBS | DIASTOLIC BLOOD PRESSURE: 60 MMHG | BODY MASS INDEX: 23.99 KG/M2 | HEART RATE: 88 BPM | SYSTOLIC BLOOD PRESSURE: 100 MMHG | HEIGHT: 68 IN

## 2024-01-12 DIAGNOSIS — Z32.01 PREGNANCY EXAMINATION OR TEST, POSITIVE RESULT: ICD-10-CM

## 2024-01-12 DIAGNOSIS — Z32.01 PREGNANCY EXAMINATION OR TEST, POSITIVE RESULT: Primary | ICD-10-CM

## 2024-01-12 PROCEDURE — 99207 PR PRENATAL VISIT: CPT | Performed by: ADVANCED PRACTICE MIDWIFE

## 2024-01-12 PROCEDURE — 76801 OB US < 14 WKS SINGLE FETUS: CPT

## 2024-01-12 NOTE — PROGRESS NOTES
Here with her family today for fetal heart tones check at 12w5d. Reports feeling well, continues with symptoms of pregnancy. Last documented + bpm at 10w5d on 12/28/23; last ultrasound 12/11/23 at 8w4d 170 bpm. Attempted doppler on 1/9/24 were not heard and given option to return to clinic or have ultrasound at that time, family elected to return to clinic due to poor experience at ultrasound and desire to avoid ultrasound if able.  Today in clinic not able to appreciate FHT by doppler, attempted informal bedside ultrasound and no evidence of cardiac activity. Advised family that these results are not conclusive but concerning. Litzy is tearful, sharing that their last pregnancy came easily and was such an easy pregnancy, this one has been so different and difficult to understand.   -We discussed possibility of missed AB and next steps. Advised recommendations will depend on pregnancy measurements.   -We briefly discussed expectant management vs medication management vs D&C; at this time preferring expectant management if determined a healthy option for her.   -We briefly discussed expectations for how bleeding might progress if spontaneous vs medication induced and making a plan for support and work.   -Answered questions about understanding why early pregnancy loss occurs and advised majority are spontaneous cellular and genetic aneuploidy is random in occurrence, reassurances provided regarding her risk factors and therapeutic listening offered.   -Advised with ultrasound today, ordered as hold and call, will have contact with GREG rodriguez who will review the results and speak with them. All questions answered today and notified GREG rodriguez of pt status.

## 2024-01-18 ENCOUNTER — THERAPY VISIT (OUTPATIENT)
Dept: PHYSICAL THERAPY | Facility: CLINIC | Age: 24
End: 2024-01-18
Attending: ADVANCED PRACTICE MIDWIFE
Payer: COMMERCIAL

## 2024-01-18 DIAGNOSIS — Z34.81 ENCOUNTER FOR SUPERVISION OF OTHER NORMAL PREGNANCY IN FIRST TRIMESTER: ICD-10-CM

## 2024-01-18 DIAGNOSIS — M25.551 HIP PAIN, RIGHT: ICD-10-CM

## 2024-01-18 PROCEDURE — 97530 THERAPEUTIC ACTIVITIES: CPT | Mod: GP | Performed by: PHYSICAL THERAPIST

## 2024-01-18 PROCEDURE — 97110 THERAPEUTIC EXERCISES: CPT | Mod: 59 | Performed by: PHYSICAL THERAPIST

## 2024-01-18 PROCEDURE — 97161 PT EVAL LOW COMPLEX 20 MIN: CPT | Mod: GP | Performed by: PHYSICAL THERAPIST

## 2024-01-18 NOTE — PROGRESS NOTES
PHYSICAL THERAPY EVALUATION  Type of Visit: Evaluation    See electronic medical record for Abuse and Falls Screening details.    Subjective       Pt is 13 weeks pregnant with her second baby, developing R hip and SI pain. Pt reports she had this with her first baby but it went away after birth, this time it is happening much earlier in pregnancy. 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 Typcially pain worse with walking 5-6/10.   Presenting condition or subjective complaint: SPD / SI joint pain in pregnancy  Date of onset: 12/28/23 (MD order)        Prior diagnostic imaging/testing results:       Prior therapy history for the same diagnosis, illness or injury: No      Living Environment  Social support: With family members   Type of home: [facility]   Stairs to enter the home: No       Ramp: No   Stairs inside the home: Yes       Help at home: None  Equipment owned:       Employment: Yes RN  Hobbies/Interests:      Patient goals for therapy: Walk without pain    Pain assessment: Pain present     Objective      PELVIC EVALUATION  ADDITIONAL HISTORY:  Sex assigned at birth: Female  Gender identity: Female    Pronouns: She/Her Hers      Bladder History:  Feels bladder filling: No  Triggers for feeling of inability to wait to go to the bathroom: No    How long can you wait to urinate: 1 hour  Gets up at night to urinate: No    Can stop the flow of urine when urinating: Yes  Volume of urine usually released: Average   Other issues:    Number of bladder infections in last 12 months:    Fluid intake per day:        Medications taken for bladder: No     Activities causing urine leak:      Amount of urine typically leaked:    Pads used to help with leaking: No        Bowel History:  Frequency of bowel movement:    Consistency of stool:      Ignores the urge to defecate: No  Other bowel issues:    Length of time spent trying to have a bowel movement:      Sexual Function History:  Sexual orientation:  Straight    Sexually active: Yes  Lubrication used: Yes Yes  Pelvic pain: Walking    Pain or difficulty with orgasms/erection/ejaculation: No    State of menopause: Perimenopause (have not gone through menopause yet)  Hormone medications: No      Are you currently pregnant: Yes, I am this many weeks pregnant: 13, Number of previous pregnancies: 1, Number of deliveries: 1, If you have delivered before, did you have any of these issues during delivery: Vaginal delivery, Have you been diagnosed with pelvic prolapse or abdominal separation: No, Do you get regular exercise: Yes, Have you tried pelvic floor strengthening exercises for 4 weeks: Yes, Do you have any history of trauma that is relevant to your care that you d like to share: No    Discussed reason for referral regarding pelvic health needs and external/internal pelvic floor muscle examination with patient/guardian.  Opportunity provided to ask questions and verbal consent for assessment and intervention was given.    PAIN: Pain Level with Use: 6/10  POSTURE: WNL  LUMBAR SCREEN: AROM WNL  HIP SCREEN:  Strength:  3/5 strength R hip abduction, 4/5 L hip     Functional Strength Testing: Double Leg Squat: Anterior knee translation    PELVIC/SI SCREEN:  WNL    PELVIC FLOOR: TTP at external pelvic floor, levator ani on R side. Pelvic exam deferred as pt currently pregnant.     ABDOMINAL ASSESSMENT    Abdominal Activation/Strength:  good TrA activation with cues    Assessment & Plan   CLINICAL IMPRESSIONS  Medical Diagnosis: R hip pain in pregnancy    Treatment Diagnosis: R SIJ pain   Impression/Assessment: Patient is a 23 year old female with R SIJ pain in pregnancy complaints.  The following significant findings have been identified: Pain, Decreased strength, Impaired muscle performance, Decreased activity tolerance, and Impaired posture. These impairments interfere with their ability to perform self care tasks, work tasks, recreational activities, and household  mobility as compared to previous level of function.     Clinical Decision Making (Complexity):  Clinical Presentation: Stable/Uncomplicated  Clinical Presentation Rationale: based on medical and personal factors listed in PT evaluation  Clinical Decision Making (Complexity): Low complexity    PLAN OF CARE  Treatment Interventions:  Interventions: Gait Training, Manual Therapy, Neuromuscular Re-education, Therapeutic Activity, Therapeutic Exercise    Long Term Goals            Frequency of Treatment: once per week  Duration of Treatment:      Recommended Referrals to Other Professionals:  NA  Education Assessment:   Learner/Method: Patient  Education Comments: Pt educated on PT role and plan of care    Risks and benefits of evaluation/treatment have been explained.   Patient/Family/caregiver agrees with Plan of Care.     Evaluation Time:     PT Eval, Low Complexity Minutes (05958): 20       Signing Clinician: Nusrat Segovia PT

## 2024-01-24 ENCOUNTER — THERAPY VISIT (OUTPATIENT)
Dept: PHYSICAL THERAPY | Facility: REHABILITATION | Age: 24
End: 2024-01-24
Payer: COMMERCIAL

## 2024-01-24 DIAGNOSIS — M79.18 MYOFASCIAL PAIN: ICD-10-CM

## 2024-01-24 DIAGNOSIS — G44.209 TENSION HEADACHE: Primary | ICD-10-CM

## 2024-01-24 PROCEDURE — 97140 MANUAL THERAPY 1/> REGIONS: CPT | Mod: GP | Performed by: PHYSICAL THERAPIST

## 2024-01-24 PROCEDURE — 97110 THERAPEUTIC EXERCISES: CPT | Mod: GP | Performed by: PHYSICAL THERAPIST

## 2024-01-29 ENCOUNTER — MYC MEDICAL ADVICE (OUTPATIENT)
Dept: MIDWIFE SERVICES | Facility: CLINIC | Age: 24
End: 2024-01-29
Payer: COMMERCIAL

## 2024-01-29 DIAGNOSIS — Z34.80 SUPERVISION OF OTHER NORMAL PREGNANCY, ANTEPARTUM: Primary | ICD-10-CM

## 2024-01-29 NOTE — TELEPHONE ENCOUNTER
Patient sent [employer] message reporting some fatigue and dizziness over the past week and requesting CBC and Ferritin to be added to labs that are scheduled to be drawn on 1/30/24. Reviewed with patient that CBC was normal (hemoglobin 11.8) a month ago and symptoms more likely related to dehydration or smaller caloric intake than needed in early pregnancy.  Patient states an understanding but would like labs added as requested. Orders placed for CBC and Ferritin per patient request.

## 2024-01-30 ENCOUNTER — LAB (OUTPATIENT)
Dept: LAB | Facility: CLINIC | Age: 24
End: 2024-01-30
Payer: COMMERCIAL

## 2024-01-30 DIAGNOSIS — Z34.80 SUPERVISION OF OTHER NORMAL PREGNANCY, ANTEPARTUM: ICD-10-CM

## 2024-01-30 DIAGNOSIS — O99.012 ANEMIA DURING PREGNANCY IN SECOND TRIMESTER: Primary | ICD-10-CM

## 2024-01-30 DIAGNOSIS — Z33.1 PREGNANCY, INCIDENTAL: ICD-10-CM

## 2024-01-30 DIAGNOSIS — E03.8 SUBCLINICAL HYPOTHYROIDISM: ICD-10-CM

## 2024-01-30 LAB
ERYTHROCYTE [DISTWIDTH] IN BLOOD BY AUTOMATED COUNT: 13.7 % (ref 10–15)
HCT VFR BLD AUTO: 34.6 % (ref 35–47)
HGB BLD-MCNC: 11.4 G/DL (ref 11.7–15.7)
MCH RBC QN AUTO: 29.2 PG (ref 26.5–33)
MCHC RBC AUTO-ENTMCNC: 32.9 G/DL (ref 31.5–36.5)
MCV RBC AUTO: 89 FL (ref 78–100)
PLATELET # BLD AUTO: 326 10E3/UL (ref 150–450)
RBC # BLD AUTO: 3.91 10E6/UL (ref 3.8–5.2)
WBC # BLD AUTO: 9.1 10E3/UL (ref 4–11)

## 2024-01-30 PROCEDURE — 84436 ASSAY OF TOTAL THYROXINE: CPT

## 2024-01-30 PROCEDURE — 82728 ASSAY OF FERRITIN: CPT

## 2024-01-30 PROCEDURE — 85027 COMPLETE CBC AUTOMATED: CPT

## 2024-01-30 PROCEDURE — 84443 ASSAY THYROID STIM HORMONE: CPT

## 2024-01-30 PROCEDURE — 36415 COLL VENOUS BLD VENIPUNCTURE: CPT

## 2024-01-30 RX ORDER — MULTIVIT WITH MINERALS/LUTEIN
250 TABLET ORAL DAILY
Qty: 30 TABLET | Refills: 4 | Status: SHIPPED | OUTPATIENT
Start: 2024-01-30

## 2024-01-30 RX ORDER — FERROUS SULFATE 325(65) MG
325 TABLET ORAL
Qty: 30 TABLET | Refills: 4 | Status: SHIPPED | OUTPATIENT
Start: 2024-01-30

## 2024-01-31 LAB
FERRITIN SERPL-MCNC: 19 NG/ML (ref 6–175)
T4 SERPL-MCNC: 11.9 UG/DL (ref 4.5–11.7)
TSH SERPL DL<=0.005 MIU/L-ACNC: 2.12 UIU/ML (ref 0.3–4.2)

## 2024-02-06 ENCOUNTER — PRENATAL OFFICE VISIT (OUTPATIENT)
Dept: MIDWIFE SERVICES | Facility: CLINIC | Age: 24
End: 2024-02-06
Payer: COMMERCIAL

## 2024-02-06 VITALS
HEART RATE: 72 BPM | WEIGHT: 160 LBS | DIASTOLIC BLOOD PRESSURE: 62 MMHG | BODY MASS INDEX: 24.33 KG/M2 | SYSTOLIC BLOOD PRESSURE: 98 MMHG

## 2024-02-06 DIAGNOSIS — E06.3 HASHIMOTO'S THYROIDITIS: ICD-10-CM

## 2024-02-06 DIAGNOSIS — R82.71 GBS BACTERIURIA: ICD-10-CM

## 2024-02-06 DIAGNOSIS — R76.8 ANTI-TPO ANTIBODIES PRESENT: ICD-10-CM

## 2024-02-06 DIAGNOSIS — Z34.80 SUPERVISION OF OTHER NORMAL PREGNANCY, ANTEPARTUM: Primary | ICD-10-CM

## 2024-02-06 DIAGNOSIS — E03.8 SUBCLINICAL HYPOTHYROIDISM: ICD-10-CM

## 2024-02-06 PROCEDURE — 99207 PR PRENATAL VISIT: CPT | Performed by: ADVANCED PRACTICE MIDWIFE

## 2024-02-06 NOTE — PROGRESS NOTES
"Litzy presents to the clinic by herself.  Reports quickening!  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  Patient continues to take a number of antiemetic medications including Phenergan, Unisom, Reglan and Zofran.  She attempts to take her prenatal vitamin and iron supplementation, but this writer suggests to postpone these daily supplements which can worsen nausea and instead take folic acid 400 mcg p.o. daily alone.  Total weight gain thus far: 5 pounds with pregravid BMI of 23.  Patient works 12-hour shifts and has a 0.6 FTE.  Reviewed ultrasound from January 12, 2024.  Continues to decline genetic screening--quad screen.  Ordered anatomy survey to be performed in approximately 4 weeks. Thyroid lab work obtained on 1/30/2021 with TSH WNL and TOTAL T4 elevated (endocrinology provider states normal for pregnancy).  Although TSH is WNL, patient feels more anxious when but total T4 is elevated.  She will ask endocrinologist whether she should consider increasing her levothyroxine dosage from 112 mcg to 125 mcg daily.  Plan to obtain TSH and free T4 when RTC unless otherwise ordered by endocrinologist.  Danger signs symptoms reviewed.  All questions answered.  Encouraged to call or RTC with any questions, concerns, or as needed.  "

## 2024-02-07 ENCOUNTER — MYC MEDICAL ADVICE (OUTPATIENT)
Dept: MIDWIFE SERVICES | Facility: CLINIC | Age: 24
End: 2024-02-07

## 2024-02-07 ENCOUNTER — THERAPY VISIT (OUTPATIENT)
Dept: PHYSICAL THERAPY | Facility: REHABILITATION | Age: 24
End: 2024-02-07
Payer: COMMERCIAL

## 2024-02-07 DIAGNOSIS — M79.18 MYOFASCIAL PAIN: ICD-10-CM

## 2024-02-07 DIAGNOSIS — G44.209 TENSION HEADACHE: Primary | ICD-10-CM

## 2024-02-07 PROCEDURE — 97140 MANUAL THERAPY 1/> REGIONS: CPT | Mod: GP | Performed by: PHYSICAL THERAPIST

## 2024-02-07 NOTE — TELEPHONE ENCOUNTER
MyChart inquiry: provided meds safe in pregnancy handout via [employer]. Advised alternatives to sudafed and afrin.

## 2024-02-20 DIAGNOSIS — O21.9 NAUSEA AND VOMITING OF PREGNANCY, ANTEPARTUM: ICD-10-CM

## 2024-02-20 RX ORDER — ONDANSETRON 4 MG/1
4 TABLET, ORALLY DISINTEGRATING ORAL EVERY 8 HOURS PRN
Qty: 90 TABLET | Refills: 1 | Status: SHIPPED | OUTPATIENT
Start: 2024-02-20 | End: 2024-03-27

## 2024-02-20 RX ORDER — PROMETHAZINE HYDROCHLORIDE 25 MG/1
25 TABLET ORAL EVERY 8 HOURS PRN
Qty: 30 TABLET | Refills: 1 | Status: SHIPPED | OUTPATIENT
Start: 2024-02-20 | End: 2024-03-05

## 2024-02-20 RX ORDER — METOCLOPRAMIDE 5 MG/1
5 TABLET ORAL
Qty: 120 TABLET | Refills: 1 | Status: SHIPPED | OUTPATIENT
Start: 2024-02-20 | End: 2024-03-05

## 2024-02-20 NOTE — TELEPHONE ENCOUNTER
Incoming faxed refill request received from [facility].  Medication requested:    Pending Prescriptions:                       Disp   Refills    promethazine (PHENERGAN) 25 MG tablet     30 tab*1            Sig: Take 1 tablet (25 mg) by mouth every 8 hours as           needed for nausea    Medication last prescribed: 12/19/2023  Last visit with [facility] CN group: 02/06/2024  Upcoming appointment(s): 03/05/2024    Refill request routed to on-call CNM for review.

## 2024-02-21 ENCOUNTER — THERAPY VISIT (OUTPATIENT)
Dept: PHYSICAL THERAPY | Facility: REHABILITATION | Age: 24
End: 2024-02-21
Payer: COMMERCIAL

## 2024-02-21 DIAGNOSIS — M79.18 MYOFASCIAL PAIN: ICD-10-CM

## 2024-02-21 DIAGNOSIS — G44.209 TENSION HEADACHE: Primary | ICD-10-CM

## 2024-02-21 PROCEDURE — 97140 MANUAL THERAPY 1/> REGIONS: CPT | Mod: GP | Performed by: PHYSICAL THERAPIST

## 2024-03-04 PROBLEM — M25.551 HIP PAIN, RIGHT: Status: RESOLVED | Noted: 2024-01-18 | Resolved: 2024-03-04

## 2024-03-04 NOTE — PROGRESS NOTES
DISCHARGE  Reason for Discharge: Patient has failed to schedule further appointments.    Equipment Issued: none    Discharge Plan: Patient to continue home program.    Referring Provider:  Olivia Cyr         01/18/24 0500   Appointment Info   Signing clinician's name / credentials Nusrat Segovia, PT, DPT   Total/Authorized Visits PT E&T   Visits Used 1   Medical Diagnosis R hip pain in pregnancy   PT Tx Diagnosis R SIJ pain   Other pertinent information JESSICA July 2024   Progress Note/Certification   Onset of illness/injury or Date of Surgery 12/28/23  (MD order)   Therapy Frequency once per week   Progress Note Completed Date 01/18/24   Subjective Report   Subjective Report see Initial Evaluation   Objective Measures   Objective Measures Objective Measure 1   Treatment Interventions (PT)   Interventions Therapeutic Procedure/Exercise;Therapeutic Activity   Therapeutic Procedure/Exercise   PTRx Ther Proc 1 Supine Abdominal Exercise #3 (Marching)   PTRx Ther Proc 1 - Details cues for form and breathing x 15   PTRx Ther Proc 2 Bridging #1   PTRx Ther Proc 2 - Details cues for form and core engagement x 15 reps   PTRx Ther Proc 3 Clamshell Feet together   PTRx Ther Proc 3 - Details cues for form x 15   Therapeutic Procedures: strength, endurance, ROM, flexibillity minutes (72225) 10   Therapeutic Activity   Therapeutic Activities: dynamic activities to improve functional performance minutes (51153) 10   Therapeutic Activities Ther Act 2   Ther Act 1 SI belt   Ther Act 1 - Details pt educated on SI belt use, trial ofone in clinc and pt reporting she has one at home and thinks it would be helpful to use   Ther Act 2 Activity modficiation   Ther Act 2 - Details pt educated on importance of hip symmetry, avoiding leg crossing, avoiding SLS. Also discussed pregnancy changes and importance of exercise and muscular stability to stabilize pubic symphysis and SI joints.   Eval/Assessments   PT Eval, Low Complexity Minutes  (80530) 20   Education   Learner/Method Patient   Education Comments Pt educated on PT role and plan of care   Plan   Home program see PTRx - phone   Plan for next session progress core and hip strengthening (squats, lateral walks?) bird dog progression, cat cow   Total Session Time   Timed Code Treatment Minutes 20   Total Treatment Time (sum of timed and untimed services) 40

## 2024-03-04 NOTE — PROGRESS NOTES
Litzy is here with Erik for her routine prenatal appt at 20 weeks 2 days gestation. Completed fetal survey ultrasound this afternoon, results pending. Feeling baby move, Having more BH contractions. Discussed importance of hydration and keeping bladder empty to decrease cramping. Continues to have issues with N&V, headaches 3-4 times a week. Taking Tylenol with little relief, headaches last all day then she feels more nausea and is having trouble keeping hydrated. Discussed use of Tylenol in combination with caffeine for headache, offered prescription for Fioricet for headaches and pt accepting. Thyroid labs done today.   Reviewed how to reach CNM with non-urgent and urgent concerns between visits. Advised to make appt in 4 weeks. Given written info on glucose testing. She is concerned that she may get sick from glucola and asked about alternative testing either with Fresh Test or QID blood sugar testing. Will decide by next visit plan for testing. Advised Fresh Test is not yet available in the system for use.

## 2024-03-05 ENCOUNTER — HOSPITAL ENCOUNTER (OUTPATIENT)
Dept: ULTRASOUND IMAGING | Facility: CLINIC | Age: 24
Discharge: HOME | End: 2024-03-05
Attending: ADVANCED PRACTICE MIDWIFE | Admitting: ADVANCED PRACTICE MIDWIFE
Payer: COMMERCIAL

## 2024-03-05 ENCOUNTER — PRENATAL OFFICE VISIT (OUTPATIENT)
Dept: MIDWIFE SERVICES | Facility: CLINIC | Age: 24
End: 2024-03-05
Payer: COMMERCIAL

## 2024-03-05 VITALS
DIASTOLIC BLOOD PRESSURE: 60 MMHG | HEART RATE: 88 BPM | BODY MASS INDEX: 24.84 KG/M2 | SYSTOLIC BLOOD PRESSURE: 90 MMHG | WEIGHT: 163.9 LBS | HEIGHT: 68 IN

## 2024-03-05 DIAGNOSIS — Z34.80 SUPERVISION OF OTHER NORMAL PREGNANCY, ANTEPARTUM: ICD-10-CM

## 2024-03-05 DIAGNOSIS — O21.0 HYPEREMESIS GRAVIDARUM: ICD-10-CM

## 2024-03-05 DIAGNOSIS — G43.C0 PERIODIC HEADACHE SYNDROME, NOT INTRACTABLE: ICD-10-CM

## 2024-03-05 DIAGNOSIS — E03.8 SUBCLINICAL HYPOTHYROIDISM: ICD-10-CM

## 2024-03-05 DIAGNOSIS — Z33.1 PREGNANCY, INCIDENTAL: ICD-10-CM

## 2024-03-05 DIAGNOSIS — E06.3 HASHIMOTO'S THYROIDITIS: Primary | ICD-10-CM

## 2024-03-05 PROCEDURE — 99213 OFFICE O/P EST LOW 20 MIN: CPT | Performed by: MIDWIFE

## 2024-03-05 PROCEDURE — 99207 PR PRENATAL VISIT: CPT | Performed by: MIDWIFE

## 2024-03-05 PROCEDURE — 84439 ASSAY OF FREE THYROXINE: CPT | Performed by: MIDWIFE

## 2024-03-05 PROCEDURE — 84443 ASSAY THYROID STIM HORMONE: CPT | Performed by: MIDWIFE

## 2024-03-05 PROCEDURE — 36415 COLL VENOUS BLD VENIPUNCTURE: CPT | Performed by: MIDWIFE

## 2024-03-05 PROCEDURE — 84436 ASSAY OF TOTAL THYROXINE: CPT | Performed by: MIDWIFE

## 2024-03-05 PROCEDURE — 76805 OB US >/= 14 WKS SNGL FETUS: CPT

## 2024-03-05 RX ORDER — BUTALBITAL, ACETAMINOPHEN AND CAFFEINE 50; 325; 40 MG/1; MG/1; MG/1
1 TABLET ORAL EVERY 4 HOURS PRN
Qty: 20 TABLET | Refills: 1 | Status: SHIPPED | OUTPATIENT
Start: 2024-03-05 | End: 2024-05-22

## 2024-03-05 NOTE — PATIENT INSTRUCTIONS
"\"We hope you had a positive experience and that you can definitely recommend [facility] to your family and friends. You ll be receiving a survey soon and we look forward to hearing your feedback\".    [facility] Contact information:  Appointment line and to get a hold of CNM in clinic Monday-Friday 8 am - 5 pm:  [phone].  There are some clinics with early start times (1st appointment 7:40 am) and others with evening hours (last appointment 6:20 pm).  Most are typically open from 8 am to 5 pm.    CNM on call answering service: [phone].  Specify your hospital of choice and leave a brief message for CNM;  will then page CNM who is on call at your specified hospital and you should receive a call back with 15 minutes.  Be sure that your ringer is audible and that you can accept blocked calls so that we can get back in touch with you! This number should be reserved for urgent needs if during the day, before 8 am, after 5 pm, weekends, holidays.    Contact the on-call CNM with warning signs, such as:  vaginal bleeding   Vaginal discharge and itching or pain and burning during urination  Leg/calf pain or swelling on one side  severe abdominal pain  nausea and vomiting more than 4-5 times a day, or if you are unable to keep anything down  fever more than 100.4 degrees F.   InsideSales.comhart  After each of your visits you are welcome to check Yilu Ca[employer]u ([employer] for your visit summary including education and links to information relevant to your pregnancy and/or well woman care.   Find the \"Visits\" tab at the top of the page, you will see a list of recent visits and for each visit a for link for \"View After Visit Summary.\" View of your After Visit Summary will allow you to read our recommendations from your visit, review any education provided, and link to websites with useful information.   If you have any questions or difficulty navigating [employer], please feel free to contact " "us and we will do our best to direct you.  Meet the Midwives from [facility]  You are invited to an informational meet and greet with [facility] Certified Nurse-Midwives. Our free \"Meet the Midwives\" event is a great opportunity to learn about our midwives' philosophy and experience, the hospitals where we can assist with your birth, and answer questions you may have. Partners, friends, and family are welcome to attend. Currently, this is a virtual event.  Date  Last Thursday of every month at 7 pm.    Link to next (live) meeting  https://[facility].org/meet-the-midwives  To Join by Telephone (audio only) Call:   [phone] Phone Conference ID: [phone] #    Childbirth and Parenting Education:     Everyday Miracles:   https://www.everyday-miracles.org/    Free Video Series from [facility]: https://nursing.[facility]/academics/specialty-areas/nurse-midwifery/having-baby-prenatal-videos/having-baby-prenatal-and    Childbirth Education virtual (live) classes: www.PromiseUP/classes  The Birth Hour: https://Starfish 360/online-childbirth-class/  BirthED: https://www.birthedmn.com/  LILLIE parenting center: http://amHenry Ford Jackson Hospitalingi-Neumaticos.The Box Populi/   (109) 509-ZKBI  Blooma: (education, yoga & wellness) www.CloudCheckr  Enlightened Mama: www.enlightenedmama.com   Childbirth collective: (Parent topic nights)  www.childbirthcollective.org/  Hypnobabies:  www.hypnobabiestSyncbakties.com/  Hypnobirthing:  Http://hypnVisible MeasuresrtFashion One.The Box Populi/  Hypnobirthing virtual class: www.Profista/hypnobirthing    Information about doulas:  Childbirth collective: http://www.childbirthcollective.org/  Doulas of [place] (NUBIA):  www.nubia.org  [facility]  project: http://Raising ITtiesdoulaPiedmont Pharmaceuticalsject.com/     Early Childhood and Family Education (ECFE):  ECFE offers parents hands-on learning experiences that will nourish a lifetime of teachable " moments.  http://ecfe.info/ecfe-home/    APPS and Podcasts:   Emeli Max Nurfrancisco    Evidence Based Birth  The Birth Hour (for birth stories)   Birthful   Expectful   The Longest Shortest Time  PregnancyPodcast Gina Merabdullahi    Book Recommendations:   Lynne Juli's Birthing From Within--first few chapters include a new-age tone, you may prefer to skip it and keep going, because there is good stuff later.  This book recommendation covers emotional preparation, but does cover coping with pain, and use of both pharmacological and nonpharmacological methods.    Guide to Childbirth by Kamilah Jha  Childbirth Without Fear by Marco A Montanez Read    Dr. Penaloza' The Pregnancy Book and The Birth Book--the pregnancy book goes month-by month    The Birth Partner by Francisca Estrella    Womanly Art of Breastfeeding by La Leche League International   Bestfeeding by Muriel Vickers--great pictures    Mothering Your Nursing Toddler, by Sylvia Zaldivar.   Addresses dealing with so many of the challenging behaviors of a nursing toddler.  How Weaning Happens, by La Leche League.  Discusses weaning at all ages, from medically necessary weaning of an infant, all the way up to age 5 (or older), with why/why not, and strategies.  Very empowering book both for deciding to wean and deciding not to.    American College of Nurse-Midwives (ACNM) http://www.midwife.org/; look at the informational handouts at http://www.midwife.org/Share-With-Women     www.mymidwife.org    Mother to Baby (Medication and Herbal guidance in pregnancy): http://www.mothertobaby.org  Toll-Free Hotline: 806.999.3313  LactMed (Medication use while breastfeeding): http://toxnet.nlm.nih.gov/newtoxnet/lactmed.htm    Women's Health.gov:  http://www.womenshealth.gov/a-z-topics/index.html    American pregnancy association - http://americanpregnancy.org    Centering Pregnancy (group prenatal care option): http://centeringhealthcare.org    March of bOombate www.Oddcast    "  FDA - Nutrition  www.mypyramid.gov  Under \"For Consumers,\" click on \"pregnant and breastfeeding women.\"      Centers for Disease Control and Prevention (CDC) - Vaccines : http://www.cdc.gov/vaccines/       When researching information on the web, question the validity of websites.  The [employer] .gov, .edu and.org tend to be more reliable information.  If there are a lot of advertisements, be cautious of the information provided. Stay away from blogs and chat rooms please!     Baby Feeding in the Hospital: Information, Support and Resources    As you prepare for the birth of your child, you will want to consider options for feeding your baby including breast-feeding and/or baby formula. The American Academy of Pediatrics recommends exclusive breast-feeding for the first six months (although any amount of breast-feeding is beneficial).  However, we also understand that breast-feeding is a personal choice and not for everyone. Whether or not you choose to breast-feed, your decision will be respected by our staff.    There are numerous benefits of breast-feeding; here are a few to consider:  Provides antibodies to protect your baby from infections and diseases  The cost: formula can cost over $1,500 per year  Convenience, no warming up or sterilizing bottles and supplies  The physical contact with breastfeeding can make babies feel secure, warm and comforted    What ever my feeding choice, what can I expect after I deliver my baby?  Your baby will usually be placed skin-to-skin immediately following birth. The skin to skin contact between you and your baby will be a special and memorable time. The bonding and attachment comforts your baby and has a positive effect on baby s brain development.   Having your baby  room in  with you also helps you start to learn your baby s body rhythms and sleep cycle.    You will also begin to learn your baby s cues (signals) that he or she is ready to feed.    When do I start to feed " my baby?  As soon as possible after your baby s birth, you will be encouraged to begin feeding.  In the first couple of weeks, your baby will eat often.  Breastfeeding babies usually eat at least 8 times in 24 hours.  Babies fed formula usually eat at least 7 times in 24 hours.      Breast-feeding tips:  Get comfortable and use pillows for support.  Have your baby at the level of your breast, facing you,  tummy to tummy .    Touch your nipple to your baby s lips so you baby s mouth opens wide (rooting reflex).  Aim the nipple toward the roof of your baby s mouth. When your baby opens his or her mouth, pull your baby toward your breast to help your baby  latch on  to your nipple and much of the areola area.  Hand expressing your breast milk can assist with latching your baby to your breast, if needed.  Ask for help, breastfeeding may seem awkward or uncomfortable at first, this is normal. There are numerous resources available at [facility] [facility]), Clinics and beyond.   If your goal is to exclusively breastfeed, avoid using any formula or artificial nipples (including bottles and pacifiers) while you are your baby are learning to breastfeed unless there is a medical reason.     Mixing breastfeeding and formula can interfere with how you begin building your milk supply.  It can impact how you and your baby  learn  to breastfeeding together and alter the natural growth of  good  bacteria in your baby s stomach.  Delay a pacifier or a bottle in the first few weeks until breastfeeding is well established. This is often around 3 weeks of age.  Ask your nurse to show you how to hand express.   Breast milk can be kept in the refrigerator or freezer for later use.        Touring the Maternity Care Center  [facility] Maternity Care:   https://[facility].org/locations/the-birthplace-at--[facility]  Maternity Care:   https://[facility].org/locations/the-birthplace-at--[facility]  Scroll to the bottom of this page if the above link does not work      Hospital and Clinic Breastfeeding Resources:  -Schedule an appointment with a [facility] Nurse [facility]   CNM who is also a Lactation Consultant by calling [phone]     -Schedule a clinic appointment with a [facility] Nurse Mid[facility] CN with dedicated clinic hours for breastfeeding assistance by calling [phone]. Breastfeeding clinic visits are at [facility] on , [facility] on  and [facility] on .     New Parent Connection:   [facility], [address] [place] [address] [place]  In-person meetings on  from 6 pm - 7:15 pm for parents of  to 9 months, at the same site.   All are free, drop-in, no registration required.    There are also free virtual meetings ongoing on :  11:30 am - 12:30 pm for parents of newborns to 3 months  4:15 pm to 5:15 pm for parents of 3 to 9-month olds  For joining info parents should call Ivanna Jimenez at [phone]      [facility] Baby Café  More information  Marianna Epps  [phone]  [email or web address].     -Attend a baby weigh in at [facility].  Lactation consultants are available to answer questions  Jeanie:  1:00 - 2:00  [facility]:  1:00 - 2:00  www.Figo Pet Insurance.Predictive Technologies    -Attend one of the New Mama groups at [facility] in [facility].  [facility] also offers one-on-one in home and in office lactation consults.   www.[facility].com    -Attend a LeLeche League meeting.  Multiple groups in several locations throughout the [facility]. The meetings are no-cost and always informative breastfeeding education session through Internatal La Leche League  Www.lllofmndas.org/    -Medication use while breastfeeding:  "http://toxnet.nlm.nih.gov/newtoxnet/lactmed.htm     Online Resources:  Breastfeedingmadesimple.com  Llli.org (La Leche Reynaldo)  Normalfed.com  Womenshealth.gov/breastfeeding  Workandpump.com    Breast-feeding Supplies & Pumps:  Talk to your insurance provider or WIC (Women, Infants and Children) to learn more about options available to you. Recent health insurance changes may include additional coverage for supplies and pumps.    Public Health:  Women, Infants and Children Nutrition program (WIC): provides breast-feeding support and education in addition to formal feeding moms. 261-BHT-6727 or http://www.health.[facility] hospital.mn.us/divs/fh/wic    Family Health Home Visiting: [facility] Nurse home visits are available. Talk to your provider to see if you qualify. Most [facility] have a program available.    Additional Resources:  La Leche Reynaldo is an international, nonprofit, nonsectarian organization offering information, education, and support to mothers who want to breast-feed their babies. Local groups offer phone help and monthly meetings. Visit CeNeRx BioPharma.LocalCustomer or Simple.TV and us the  Find local support  drop down menu or click on the  Resources  tab.    [place] Breastfeeding Resources: 6-934-389-BABY (2229) toll free    National Breastfeeding Help Line trained breastfeeding peer counselors can help answer common breast-feeding questions by phone. Monday-Friday: English/Filipino  [phone]- [phone] toll free, 1-818.576.2101 (TTY)    Virtual Breastfeeding Support:    During this time of isolation, breastfeeding families need even more community!  Here are some area organizations offering virtual support groups for breastfeeding:    [facility] Cafe Support Group, Tuesdays at 10:30 am   Run by ADAN Neville of The Baby Whisperer Lactation Consultants   Go to The Baby Whisperer Lactation Consultants Facebook page and click on \"events\" for link   https://www.facebook.com/events/068345046308260/  [facility] Milk Hour, "  at 2:30 pm    Run by ADAN Gaviria   Go to [facility] FB page and send message to get link   https://www.Yandex.Surefield/[facility] CenterfeedPackundStanton County Health Care Facility/  Eloy Jacobs [facility]/[place] holding virtual meetings the first Tuesday of each month, 8-9 pm, and the   Third Saturday, 10 - 11 am.  Go to La Leche [facility] and [place] FB page; message to get link https://www.Yandex.Surefield/LLLofGoldMikel/?hc_location=[facility]  [place]om offers a Lactation Lounge every Friday 12pm - 1pm, run by Eloy Jurado Leader   Sign up via link at https://www.Polar OLED/cbe-lactation  [facility] is offering virtual support groups every Monday, 10:30 am - 12 pm, run by nurse ADAN   Https://www.Yandex.com/events/455456609265750/    Prenatal Breastfeeding Classes:      [facility] is offering virtual breastfeeding and  care classes:  https://www.Polar OLED/education-workshops  BirthEd childbirth and breastfeeding education offering virtual prenatal breastfeeding classes  https://www.birthedmn.com/workshops

## 2024-03-06 ENCOUNTER — MYC MEDICAL ADVICE (OUTPATIENT)
Dept: MIDWIFE SERVICES | Facility: CLINIC | Age: 24
End: 2024-03-06
Payer: COMMERCIAL

## 2024-03-06 DIAGNOSIS — L70.0 ACNE VULGARIS: Primary | ICD-10-CM

## 2024-03-06 LAB
T4 FREE SERPL-MCNC: 1.15 NG/DL (ref 0.9–1.7)
T4 SERPL-MCNC: 12.7 UG/DL (ref 4.5–11.7)
TSH SERPL DL<=0.005 MIU/L-ACNC: 2.76 UIU/ML (ref 0.3–4.2)

## 2024-03-06 RX ORDER — CLINDAMYCIN PHOSPHATE 11.9 MG/ML
SOLUTION TOPICAL 2 TIMES DAILY
Qty: 30 ML | Refills: 0 | Status: SHIPPED | OUTPATIENT
Start: 2024-03-06 | End: 2024-03-27

## 2024-03-27 ENCOUNTER — PRENATAL OFFICE VISIT (OUTPATIENT)
Dept: MIDWIFE SERVICES | Facility: CLINIC | Age: 24
End: 2024-03-27
Payer: COMMERCIAL

## 2024-03-27 VITALS
WEIGHT: 167 LBS | HEIGHT: 68 IN | HEART RATE: 80 BPM | DIASTOLIC BLOOD PRESSURE: 66 MMHG | SYSTOLIC BLOOD PRESSURE: 90 MMHG | BODY MASS INDEX: 25.31 KG/M2

## 2024-03-27 DIAGNOSIS — Z3A.23 23 WEEKS GESTATION OF PREGNANCY: ICD-10-CM

## 2024-03-27 DIAGNOSIS — F41.0 PANIC ATTACK: ICD-10-CM

## 2024-03-27 DIAGNOSIS — F41.1 GAD (GENERALIZED ANXIETY DISORDER): Primary | ICD-10-CM

## 2024-03-27 DIAGNOSIS — E06.3 HASHIMOTO'S THYROIDITIS: ICD-10-CM

## 2024-03-27 DIAGNOSIS — L70.0 ACNE VULGARIS: ICD-10-CM

## 2024-03-27 PROBLEM — D50.8 IRON DEFICIENCY ANEMIA SECONDARY TO INADEQUATE DIETARY IRON INTAKE: Status: RESOLVED | Noted: 2021-10-22 | Resolved: 2024-03-27

## 2024-03-27 LAB
ALBUMIN SERPL BCG-MCNC: 3.6 G/DL (ref 3.5–5.2)
ALP SERPL-CCNC: 64 U/L (ref 40–150)
ALT SERPL W P-5'-P-CCNC: 10 U/L (ref 0–50)
ANION GAP SERPL CALCULATED.3IONS-SCNC: 12 MMOL/L (ref 7–15)
AST SERPL W P-5'-P-CCNC: 15 U/L (ref 0–45)
BILIRUB SERPL-MCNC: 0.4 MG/DL
BUN SERPL-MCNC: 9.2 MG/DL (ref 6–20)
CALCIUM SERPL-MCNC: 8.7 MG/DL (ref 8.6–10)
CHLORIDE SERPL-SCNC: 101 MMOL/L (ref 98–107)
CREAT SERPL-MCNC: 0.7 MG/DL (ref 0.51–0.95)
DEPRECATED HCO3 PLAS-SCNC: 23 MMOL/L (ref 22–29)
EGFRCR SERPLBLD CKD-EPI 2021: >90 ML/MIN/1.73M2
ERYTHROCYTE [DISTWIDTH] IN BLOOD BY AUTOMATED COUNT: 13.9 % (ref 10–15)
GLUCOSE SERPL-MCNC: 69 MG/DL (ref 70–99)
HCT VFR BLD AUTO: 35 % (ref 35–47)
HGB BLD-MCNC: 11.5 G/DL (ref 11.7–15.7)
MCH RBC QN AUTO: 29.6 PG (ref 26.5–33)
MCHC RBC AUTO-ENTMCNC: 32.9 G/DL (ref 31.5–36.5)
MCV RBC AUTO: 90 FL (ref 78–100)
PLATELET # BLD AUTO: 322 10E3/UL (ref 150–450)
POTASSIUM SERPL-SCNC: 4 MMOL/L (ref 3.4–5.3)
PROT SERPL-MCNC: 6.4 G/DL (ref 6.4–8.3)
RBC # BLD AUTO: 3.89 10E6/UL (ref 3.8–5.2)
SODIUM SERPL-SCNC: 136 MMOL/L (ref 135–145)
T4 FREE SERPL-MCNC: 1.34 NG/DL (ref 0.9–1.7)
TSH SERPL DL<=0.005 MIU/L-ACNC: 2.14 UIU/ML (ref 0.3–4.2)
WBC # BLD AUTO: 8.5 10E3/UL (ref 4–11)

## 2024-03-27 PROCEDURE — 85027 COMPLETE CBC AUTOMATED: CPT | Performed by: ADVANCED PRACTICE MIDWIFE

## 2024-03-27 PROCEDURE — 99215 OFFICE O/P EST HI 40 MIN: CPT | Performed by: ADVANCED PRACTICE MIDWIFE

## 2024-03-27 PROCEDURE — 80053 COMPREHEN METABOLIC PANEL: CPT | Performed by: ADVANCED PRACTICE MIDWIFE

## 2024-03-27 PROCEDURE — 36415 COLL VENOUS BLD VENIPUNCTURE: CPT | Performed by: ADVANCED PRACTICE MIDWIFE

## 2024-03-27 PROCEDURE — 84443 ASSAY THYROID STIM HORMONE: CPT | Performed by: ADVANCED PRACTICE MIDWIFE

## 2024-03-27 PROCEDURE — 84439 ASSAY OF FREE THYROXINE: CPT | Performed by: ADVANCED PRACTICE MIDWIFE

## 2024-03-27 RX ORDER — CLINDAMYCIN PHOSPHATE 11.9 MG/ML
SOLUTION TOPICAL 2 TIMES DAILY
Qty: 30 ML | Refills: 0 | Status: SHIPPED | OUTPATIENT
Start: 2024-03-27 | End: 2024-04-17

## 2024-03-27 RX ORDER — HYDROXYZINE HYDROCHLORIDE 25 MG/1
25-50 TABLET, FILM COATED ORAL DAILY PRN
Qty: 30 TABLET | Refills: 1 | Status: SHIPPED | OUTPATIENT
Start: 2024-03-27 | End: 2024-07-03

## 2024-03-27 ASSESSMENT — ANXIETY QUESTIONNAIRES
1. FEELING NERVOUS, ANXIOUS, OR ON EDGE: NEARLY EVERY DAY
3. WORRYING TOO MUCH ABOUT DIFFERENT THINGS: NEARLY EVERY DAY
6. BECOMING EASILY ANNOYED OR IRRITABLE: SEVERAL DAYS
IF YOU CHECKED OFF ANY PROBLEMS ON THIS QUESTIONNAIRE, HOW DIFFICULT HAVE THESE PROBLEMS MADE IT FOR YOU TO DO YOUR WORK, TAKE CARE OF THINGS AT HOME, OR GET ALONG WITH OTHER PEOPLE: VERY DIFFICULT
7. FEELING AFRAID AS IF SOMETHING AWFUL MIGHT HAPPEN: MORE THAN HALF THE DAYS
GAD7 TOTAL SCORE: 16
2. NOT BEING ABLE TO STOP OR CONTROL WORRYING: NEARLY EVERY DAY
5. BEING SO RESTLESS THAT IT IS HARD TO SIT STILL: SEVERAL DAYS

## 2024-03-27 ASSESSMENT — PATIENT HEALTH QUESTIONNAIRE - PHQ9: 5. POOR APPETITE OR OVEREATING: NEARLY EVERY DAY

## 2024-03-27 NOTE — PATIENT INSTRUCTIONS
Luis Mcghee,  It was nice to visit with you today and I hope you found it helpful. I'm glad you came in! Below are a few links to the Mother To Baby website that you can look into on selective serotonin reuptake inhibitor use in pregnancy. Please keep us updated on how you're feeling and we will be in touch with lab results.    Sincerely,  Mackenzie Lazar, GREG        Citalopram  Escitalopram (Celexa   Lexapro ) - MotherToBa     Sertraline (Zoloft ) - MotherToBaby

## 2024-03-27 NOTE — PROGRESS NOTES
"SUBJECTIVE:  Litzy Hatch is a 23 year old female at 23w3d gestation here for problem visit in pregnancy.  Chief Complaint   Patient presents with    Anxiety     Litzy has been noticing worsening anxiety this pregnancy, particularly so for the past 2-4 weeks but it really began after fear of miscarriage earlier in the pregnancy when the heartbeat was not easily heard in this clinic. She is noticing frequent thoughts that something bad is going to happen, particularly fear of  labor. She had to cancel a recent trip because of her anxiety. She had her first panic attack Saturday night. She is starting therapy and wondering about medication options. She is taking thyroid medication for Hashimotos. Also desires a refill for topical Cleocin for back acne.    Active diagnoses this visit:  CHRISTINA (generalized anxiety disorder)  Hashimoto's thyroiditis  Panic attack  Acne vulgaris    Review Of Systems: negative except that which is obtained in the HPI.    OBJECTIVE:  BP 90/66   Pulse 80   Ht 1.727 m (5' 8\")   Wt 75.8 kg (167 lb)   LMP 10/15/2023 (Exact Date)   BMI 25.39 kg/m      EPDS: 11, \"never\" #10    CHRISTINA-7: 16, \"very difficult\"    Exam:  Constitutional: healthy, alert, and no acute distress  Head: Normocephalic. No masses, lesions, tenderness or abnormalities  ENT: ENT exam normal, no neck nodes or sinus tenderness  Cardiovascular: Well perfused.  Respiratory: Breathing unlabored.  Gastrointestinal: Abdomen soft, non-tender, gravid. FHTs easily heard today and WNL.  : Deferred  Musculoskeletal: extremities normal, no gross deformities noted, gait normal, and normal muscle tone  Skin: no suspicious lesions or rashes  Neurologic:Sensation grossly WNL.  Psychiatric: mentation appears normal and affect normal.    ASSESSMENT / PLAN:  (F41.1) CHRISTINA (generalized anxiety disorder)  (primary encounter diagnosis)  Comment: Commended steps toward improving her mood and starting therapy as well as coming into " clinic today. Discussed therapy and medication in combo can be most helpful. Discussed balancing risks and benefits of starting selective serotonin reuptake inhibitor use in pregnancy and the well-studied medications. She would like to consider her options a little further before starting a selective serotonin reuptake inhibitor and is hoping to have something just as needed for now for panic attacks. Advised on lifestyle/exercise and positive affirmations that she could try as well. Advised on labs today below.  Plan: CBC with platelets, Comprehensive metabolic         panel (BMP + Alb, Alk Phos, ALT, AST, Total.         Bili, TP), TSH, T4, free    (F41.0) Panic attack  Comment: Discussed Atarax use in pregnancy and as needed use for panic attacks. Advised that it can make her drowsy and should not operate vehicles while using.  Plan: hydrOXYzine HCl (ATARAX) 25 MG tablet    (E06.3) Hashimoto's thyroiditis  Comment: Thyroid labs last drawn in early March, however with worsening mood sxs, she is open to rechecking today to ensure it is not related to her thyroid.  Plan: TSH, T4, free      (L70.0) Acne vulgaris  Comment: Patient desires refill for back acne.  Plan: clindamycin (CLEOCIN T) 1 % external solution       40 minutes on the date of the encounter doing chart review, patient visit, and documentation    LISA Cabral, GREG

## 2024-03-29 ASSESSMENT — ANXIETY QUESTIONNAIRES: GAD7 TOTAL SCORE: 16

## 2024-04-16 PROBLEM — O44.40 LOW-LYING PLACENTA: Status: ACTIVE | Noted: 2024-04-16

## 2024-04-16 NOTE — PROGRESS NOTES
"Litzy Hatch is here for CHRISTINE at 26w3d. Here with Mally today.  Litzy was seen last for problem visit in pregnancy due to worsening anxiety. She was prescribed Atarax PRN for panic attacks but was going to wait and see if she needed to start a daily selective serotonin reuptake inhibitor. Today she reports she is feeling much better after initiating therapy (enjoys her therapist) and has not taken/needed the Atarax at all.  Last visit's mood screenings on 3/27/24:  EPDS: 11, \"never\" #10  CHRISTINA-7: 16, \"very difficult\"  Today's mood screenings with improvement noted:  EPDS: 9, \"never\" #10.  CHRISTINA-7: 11, \"not at all difficult\"  Her family is considering a trip to [place] in a couple of weeks. She is wondering about dramamine for the plane ride -advised safety of this in pregnancy for her flight and advised on travel precautions.  Fetal movement is active.   Litzy had spoken to a midwife at a prior visit about declining the 1-hour glucose test. Last time she got a bad migraine. She prefers to check her BG via a continuous monitor that she has and she has already been cheking for 1 week. Reviewed her values today (all normal), however she has only been checking fasting levels and 1-hour and 2-hours after breakfast. She did not check 1-hour after lunch or dinner and we discussed the importance of obtaining values four times per day. She will continue testing for another week, including fasting levels, 1-hour after breakfast, lunch and dinner.  Her Hgb was checked at last visit via CBC (11.5) and she has been taking iron every other day with Vitamin C. Encouraged she continue these supplements.  Will recheck Hgb and Hgb A1C at next visit. Also discussed growth scan around 34-36 weeks for alternative glucose plan which she agrees to.  Patient's blood type is O POS.   We discussed completing a follow up US at 28 weeks to evaluate her placenta's location as it was low lying on previous exam. Order placed and " patient will schedule.  TW.441 kg (14 lb 3.2 oz), and appropriate.  Uterine size appropriate for dates.  3rd trimester handouts given and reviewed today including PTL precautions and fetal movement monitoring.   RTC 4 weeks.

## 2024-04-17 ENCOUNTER — PRENATAL OFFICE VISIT (OUTPATIENT)
Dept: MIDWIFE SERVICES | Facility: CLINIC | Age: 24
End: 2024-04-17
Payer: COMMERCIAL

## 2024-04-17 VITALS
SYSTOLIC BLOOD PRESSURE: 90 MMHG | HEIGHT: 68 IN | DIASTOLIC BLOOD PRESSURE: 62 MMHG | WEIGHT: 169.2 LBS | HEART RATE: 96 BPM | BODY MASS INDEX: 25.64 KG/M2

## 2024-04-17 DIAGNOSIS — O44.40 LOW-LYING PLACENTA: ICD-10-CM

## 2024-04-17 DIAGNOSIS — F41.1 GAD (GENERALIZED ANXIETY DISORDER): ICD-10-CM

## 2024-04-17 DIAGNOSIS — E06.3 HASHIMOTO'S THYROIDITIS: ICD-10-CM

## 2024-04-17 DIAGNOSIS — Z34.80 SUPERVISION OF OTHER NORMAL PREGNANCY, ANTEPARTUM: Primary | ICD-10-CM

## 2024-04-17 DIAGNOSIS — L70.0 ACNE VULGARIS: ICD-10-CM

## 2024-04-17 DIAGNOSIS — R82.71 GBS BACTERIURIA: ICD-10-CM

## 2024-04-17 PROCEDURE — 99207 PR PRENATAL VISIT: CPT | Performed by: ADVANCED PRACTICE MIDWIFE

## 2024-04-17 RX ORDER — CLINDAMYCIN PHOSPHATE 11.9 MG/ML
SOLUTION TOPICAL 2 TIMES DAILY PRN
Qty: 30 ML | Refills: 2 | Status: SHIPPED | OUTPATIENT
Start: 2024-04-17 | End: 2024-08-29

## 2024-04-17 ASSESSMENT — ANXIETY QUESTIONNAIRES
6. BECOMING EASILY ANNOYED OR IRRITABLE: SEVERAL DAYS
5. BEING SO RESTLESS THAT IT IS HARD TO SIT STILL: SEVERAL DAYS
IF YOU CHECKED OFF ANY PROBLEMS ON THIS QUESTIONNAIRE, HOW DIFFICULT HAVE THESE PROBLEMS MADE IT FOR YOU TO DO YOUR WORK, TAKE CARE OF THINGS AT HOME, OR GET ALONG WITH OTHER PEOPLE: NOT DIFFICULT AT ALL
1. FEELING NERVOUS, ANXIOUS, OR ON EDGE: MORE THAN HALF THE DAYS
7. FEELING AFRAID AS IF SOMETHING AWFUL MIGHT HAPPEN: MORE THAN HALF THE DAYS
3. WORRYING TOO MUCH ABOUT DIFFERENT THINGS: MORE THAN HALF THE DAYS
GAD7 TOTAL SCORE: 11
2. NOT BEING ABLE TO STOP OR CONTROL WORRYING: SEVERAL DAYS

## 2024-04-17 ASSESSMENT — PATIENT HEALTH QUESTIONNAIRE - PHQ9: 5. POOR APPETITE OR OVEREATING: MORE THAN HALF THE DAYS

## 2024-04-18 ASSESSMENT — ANXIETY QUESTIONNAIRES: GAD7 TOTAL SCORE: 11

## 2024-04-20 ENCOUNTER — TELEPHONE (OUTPATIENT)
Dept: MIDWIFE SERVICES | Facility: CLINIC | Age: 24
End: 2024-04-20
Payer: COMMERCIAL

## 2024-04-20 NOTE — TELEPHONE ENCOUNTER
"PHONE NOTE: Patient is a -0-0-1 at 26w 6d.  She paged the midwife on-call with complaint of increased pelvic pressure.  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  But she had also been up yesterday as well so she is on 26 hours of no sleep.  She says that over the last 4 to 5 days she has noticed that her labia are a bit swollen and she feels some increased pelvic pressure.  She states that with her first pregnancy, she had quite a few Shahzad Dominique contractions but delivered at term.  She never felt this feeling in her first pregnancy.  She has not had any Ken[place] Dominique contractions or any tightenings in the last 24 hours.  This CNM described possible vaginal vasocongestion issues and compared it to after sexual intercourse.  Patient she says \"yes, this is what it feels like \".  Made recommendations of good quality, heavy-duty maternity support belt, getting off of her feet more, having shorter shifts if possible, taking her breaks as needed.  Paying attention to weight lifting limits.  Also mentioned \"V supporter\", pressure on vaginal area or ice packs if needed.  Resting/lying horizontally with a lateral tilt, keeping feet up, etc.  Patient will continue to monitor and make changes as needed.  Reassured.  No further questions.  Patient has phone numbers and knows how to get ahold of the midwives if needed with any questions, increased symptoms, or if she has contractions with the pelvic pressure.  "

## 2024-05-05 ENCOUNTER — HEALTH MAINTENANCE LETTER (OUTPATIENT)
Age: 24
End: 2024-05-05

## 2024-05-21 NOTE — PROGRESS NOTES
Litzy Hatch is here for CHRISTINE at 31w3d.  Had a great trip! Has been feeling better emotionally. Used Atarax twice since prescribed. Having heartburn and using TUMS at night.  She has an ultrasound today to check on placental location after this visit.   She had declined the 1-hour glucose and so has been doing QID checks. Advised on Hgb A1C collection today as well due to alternative glucose testing plan and she accepts.   Shared her log today for QID checks for the past week (1 hour post meals):  -  F: 80-94 (94 is highest)  B:   L:   D:   Reviewed normal values.  Discussed MD recommendations for RPR in 3rd trimester and she accepts this blood draw today as well. Desires thyroid and Vitamin D lab work as well.  Fetal movement is active, quite active, doing big turns frequently. Feels like she is figuring out baby's rythyms.    32 week checklist done.  Feeding plans/breast pump: breast  [place] Provider chosen: Dr. Hightower  PP BCM/family planning: Spencer method, used in the past as well.  Birth plan/preferences: Reviewed birth plan today and will scan into chart. Hoping for un medicated birth, hydrotherapy, Magallanes hour.  Car seat: yes  Circumcision if boy: Likely no.     TW.072 kg (20 lb) and appropriate.  Uterine size appropriate for dates. Baby palpates breech/transverse today with head in maternal right quadrant. She has her US after this. Discussed if baby is breech, she could look into spinning babies but most babies will be head down by 36 weeks.  RTC 2 weeks.

## 2024-05-22 ENCOUNTER — PRENATAL OFFICE VISIT (OUTPATIENT)
Dept: MIDWIFE SERVICES | Facility: CLINIC | Age: 24
End: 2024-05-22
Payer: COMMERCIAL

## 2024-05-22 ENCOUNTER — HOSPITAL ENCOUNTER (OUTPATIENT)
Dept: ULTRASOUND IMAGING | Facility: CLINIC | Age: 24
Discharge: HOME | End: 2024-05-22
Attending: ADVANCED PRACTICE MIDWIFE | Admitting: ADVANCED PRACTICE MIDWIFE
Payer: COMMERCIAL

## 2024-05-22 VITALS
WEIGHT: 175 LBS | SYSTOLIC BLOOD PRESSURE: 102 MMHG | DIASTOLIC BLOOD PRESSURE: 62 MMHG | HEART RATE: 91 BPM | OXYGEN SATURATION: 99 % | BODY MASS INDEX: 26.61 KG/M2

## 2024-05-22 DIAGNOSIS — E06.3 HASHIMOTO'S THYROIDITIS: ICD-10-CM

## 2024-05-22 DIAGNOSIS — O44.40 LOW-LYING PLACENTA: ICD-10-CM

## 2024-05-22 DIAGNOSIS — Z34.80 SUPERVISION OF OTHER NORMAL PREGNANCY, ANTEPARTUM: Primary | ICD-10-CM

## 2024-05-22 DIAGNOSIS — Z34.80 SUPERVISION OF OTHER NORMAL PREGNANCY, ANTEPARTUM: ICD-10-CM

## 2024-05-22 LAB — HBA1C MFR BLD: 5.9 %

## 2024-05-22 PROCEDURE — 36415 COLL VENOUS BLD VENIPUNCTURE: CPT | Performed by: ADVANCED PRACTICE MIDWIFE

## 2024-05-22 PROCEDURE — 86780 TREPONEMA PALLIDUM: CPT | Performed by: ADVANCED PRACTICE MIDWIFE

## 2024-05-22 PROCEDURE — 76816 OB US FOLLOW-UP PER FETUS: CPT

## 2024-05-22 PROCEDURE — 99207 PR PRENATAL VISIT: CPT | Performed by: ADVANCED PRACTICE MIDWIFE

## 2024-05-22 PROCEDURE — 84443 ASSAY THYROID STIM HORMONE: CPT | Performed by: ADVANCED PRACTICE MIDWIFE

## 2024-05-22 PROCEDURE — 84439 ASSAY OF FREE THYROXINE: CPT | Performed by: ADVANCED PRACTICE MIDWIFE

## 2024-05-22 PROCEDURE — 83036 HEMOGLOBIN GLYCOSYLATED A1C: CPT | Performed by: ADVANCED PRACTICE MIDWIFE

## 2024-05-22 PROCEDURE — 82306 VITAMIN D 25 HYDROXY: CPT | Performed by: ADVANCED PRACTICE MIDWIFE

## 2024-05-22 RX ORDER — FAMOTIDINE 20 MG
2 TABLET ORAL DAILY
COMMUNITY

## 2024-05-23 ENCOUNTER — DOCUMENTATION ONLY (OUTPATIENT)
Dept: MIDWIFE SERVICES | Facility: CLINIC | Age: 24
End: 2024-05-23
Payer: COMMERCIAL

## 2024-05-23 LAB
T PALLIDUM AB SER QL: NONREACTIVE
T4 FREE SERPL-MCNC: 1.06 NG/DL (ref 0.9–1.7)
TSH SERPL DL<=0.005 MIU/L-ACNC: 1.66 UIU/ML (ref 0.3–4.2)
VIT D+METAB SERPL-MCNC: 44 NG/ML (ref 20–50)

## 2024-05-23 NOTE — PATIENT INSTRUCTIONS
"\"We hope you had a positive experience and that you can definitely recommend [facility] to your family and friends. You ll be receiving a survey soon and we look forward to hearing your feedback\".    [facility]  - Contact information:  Appointment line and to get a hold of CNM in clinic Monday-Friday 8 am - 5 pm:  [phone].  There are some clinics with early start times (1st appointment 7:40 am) and others with evening hours (last appointment 6:20 pm).  Most are typically open from 8 am to 5 pm.    CNM on call answering service: [phone].  Specify your hospital of choice and leave a brief message for CNM;  will then page CNM who is on call at your specified hospital and you should receive a call back with 15 minutes.  Be sure that your ringer is audible and that you can accept blocked calls so that we can get back in touch with you! This number should be reserved for urgent needs if during the day, before 8 am, after 5 pm, weekends, holidays.    Contact the on-call CNM with warning signs, such as:  vaginal bleeding   Vaginal discharge and itching or pain and burning during urination  Leg/calf pain or swelling on one side  severe abdominal pain  nausea and vomiting more than 4-5 times a day, or if you are unable to keep anything down  fever more than 100.4 degrees F.     [place] Table[facility]  After each of your visits you are welcome to check [place] for your visit summary including education and links to information relevant to your pregnancy and/or well woman care.   Find the \"Visits\" tab at the top of the page, you will see a list of recent visits and for each visit a for link for \"View After Visit Summary.\" View of your After Visit Summary will allow you to read our recommendations from your visit, review any education provided, and link to websites with useful information.   If you have any questions or difficulty navigating miCab, please feel free to " "contact us and we will do our best to direct you.  Meet the Midwives from [facility]  You are invited to an informational meet and greet with [facility] Certified Nurse-Midwives. Our free \"Meet the Midwives\" event is a great opportunity to learn about our midwives' philosophy and experience, the hospitals where we can assist with your birth, and answer questions you may have. Partners, friends, and family are welcome to attend. Currently, this is a virtual event.  Date  Last Thursday of every month at 7 pm.    Link to next (live) meeting  [email or web address]  To Join by Telephone (audio only) Call:   [phone] Phone Conference ID: [phone] #      Touring the Maternity Care Center  At this time we are offering a virtual tour of the Maternity Care Centers at both [facility] and [facility]:   [facility] Maternity Care:   https://[facility].Crisp Media/locations/the-birthplace-at--[facility] Maternity Care:   [email or web address]://[facility]ures[facility]ash.Crisp Media/locations/the-[facility]place-[facility]    Scroll to the bottom of the page in this link if the above link does not work.      Breastfeeding and Birth Control  How do I decide what birth control method is best for me while I am breastfeeding?  Choosing a method of birth control is very personal. First, answer the following questions:     Do you want to have more children?   How much spacing between births do you want for your children?   Do you smoke or have you had any health problems, such as liver disease or a blood clot?  Talk about the answers to each of these questions with your health care provider to help you choose the best method for you.    Can I use breastfeeding as my birth control?  Using breastfeeding as your birth control (the lactational amenorrhea method) can be a good way to keep from getting pregnant in the first " months after the baby is born. Each time your baby nurses, your body releases a hormone called prolactin, which stops your body from making the hormones that cause you to ovulate (release an egg). If you are not ovulating, you cannot get pregnant.    The lactational amenorrhea method works only if:   you have not started your period yet.   you are breastfeeding only and not giving your baby any other food or drink.   you are breastfeeding at least every 4 hours during the day and every 6 hours at night.   your baby is less than 6 months old.  When any 1 of these 4 things is not happening, you no longer have good protection from getting pregnant, and you should use another form of birth control.    What birth control methods are safe for me to use while I breastfeed?    Methods without hormones  Methods without hormones do not affect you, your baby, or your breastfeeding.  Methods without hormones that are the most effective   The copper intrauterine contraceptive device (IUD) (ParaGard) is a small, T-shaped device that is in- serted into your uterus (womb) through the vagina and cervix. The copper IUD lasts for 10 years.   Sterilization (getting your tubes tied or your partner having a vasectomy) is very effective, but it is per- manent. You should choose sterilization only if you do not want to have more children.  A method without hormones that is effective   The lactational amenorrhea method described above is effective for the first 6 months.  Methods without hormones that are less effective   Natural family planning is monitoring your body for signs of ovulation and not having sex when you think you are ovulating. This method is reliable only if you are having regular periods every month.   Barrier methods (condoms, diaphragms, sponges, and spermicides) are used at the time you have sex. These methods are effective only if you use them correctly every time.    Methods with hormones  Birth control methods that  use hormones can be used while you are breastfeeding. They may have a small effect on lowering the amount of milk you make. All hormones will get into your breast milk in very small amounts, but there is no known harm to your baby from this small amount of hormone in breast milk.only methodsmethods use only 1 hormone, called progestin. You can start them right after your baby is born or wait 4 to 6 weeks to make sure your milk supply is good.   Progestin-only pills ( minipills ): If you like to take pills every day, you can use the minipill. In order forpill to work well, you have to take 1 at the same time each day. When you stop breastfeeding, you should start pills that have both estrogen and progestin because they are better at keeping you from get- ting pregnant.   Progestin IUD (Mirena): The progestin IUD is shaped and inserted into the uterus like the copper IUD. It works for up to 5 years. Both IUDs are usually inserted 4 to 6 weeks after the baby is born.  Progestin implant (Nexplanon): The progestin implant is a small matchstick-sized flexible favio. It is placed into the fatty tissue in the back of your arm. It works for up to 3 years.  Progestin shot (Depo-Provera): The progestin shot is given every 3 months.    estrogen and progestin methods  These methods use 2 hormones, called estrogen and progestin.     These methods increase your risk of a blood clot, which is already higher than normal after you have a baby. You should not use them until your baby is at least 6 weeks old. The combined methods are not recommended as the first choice for women who are breastfeeding. If a combined method is the one that you feel will be best for you to prevent getting pregnant, these methods are okay to use while breastfeeding.   Combined birth control pills: You take a pill each day.  Vaginal ring (NuvaRing): The ring is worn in the vagina for 3 weeks then left out for 1 week before youin a new ring.  Patch (Ortho  Aquilino): The patch is placed on your skin and changed every week for 3 weeks then left off forweek before putting a new patch on a different area of your skin.    Pediatric Care Providers at [facility]:    Choosing the right provider is one of the most important decisions you ll make about your health care. We can help you find the right one. Remember, you re looking for a provider you can trust and work with to improve your health and well-being, so take time to think about what you need. Depending on how complicated your health care needs are, you may need to see more than one type of provider.    Primary Care Providers: You ll see a primary care provider first for most health issues. They ll work with you to get your recommended screenings, help you manage chronic conditions, and refer you to other types of providers if you need them. Your primary care provider may be called a family physician or doctor, internist, general practitioner, nurse practitioner, or physician s assistant. Your child or teenager s provider may be called a pediatrician.  Specialists: You ll see a specialist for certain services or to treat specific conditions. Specialists include cardiologists, oncologists, psychologists, allergists, podiatrists, and orthopedists. You may need a referral from your primary care provider before you go to a specialist in order for your health plan to pay for your visit.\Williamere are some tips for finding a provider where you live:  If you already have a provider you like and want to keep working with, call their office and ask if they accept your coverage.  Call your insurance company or state Medicaid and CHIP program. Look at their website or check your member handbook to find providers in your network who take your health coverage.  Ask your friends or family if they have providers they like and use these tools to compare health care providers in your area.    Family Medicine at  [facility]:      https://www.[place].org/specialties/family-medicine    Many of our families enjoy all seeing the same doctor, who comes to know the whole family very well. We base our practice on the knowledge of the patient in the context of family and community.  WHY CHOOSE A FAMILY MEDICINE PHYSICIAN?  Ability of the whole family to see the same doctor  Focus on the whole person, including physical and emotional health  A personal relationship with their doctor that is nurtured over time  Respect for individual and family beliefs and values  No need to change primary care providers when a certain age is reached  Coordination of care when other health care services are needed    Pediatrics at  [facility]:   https://www.[place].org/specialties/pediatric-care    Through a teaching affiliation with the [facility], [facility] staff keeps current on new developments in the field of pediatrics.     Everything we do centers around caring for children. We place special emphasis on wellness and prevention.pediatric care team includes a team of pediatricians and certified nurse practitioners who provide care to pediatric and adolescent patients ages 0 to 18, and some up to the age of 26. We offer preventive health maintenance for healthy children as well the diagnosis and treatment of common and chronic illnesses and injuries. In addition, we also offer several pediatric specialists who focus on adolescent health issues and developmental and behavioral issues.    Circumcision    Educational video:     [email or web address]    For More Information  American Academy of Family Physicians: Circumcision  http://familydoctor.org/familydoctor/en/pregnancy-newborns/caring-for-newborns/infant- care/circumcision.html  MedlinePlus: Circumcision (includes a slide show on the procedure)  www.nlm.nih.gov/medlineplus/circumcision.html  American Academy of Pediatrics:  Policy statement on circumcision  Http://pediatrics.aappublications.org/content/130/3/e756.abstract      Childbirth and Parenting Education:     Everyday Miracles:   https://www.everyday-miracles.org/    Free Video Series from [facility]: https://nursing.[facility]/academics/specialty-areas/nurse-midwifery/having-baby-prenatal-videos/having-baby-prenatal-and    Childbirth Education virtual (live) classes: www.Brainient/classes  The Birth Hour: https://OBOOK/online-childbirth-class/  BirthED: https://www.birthedmn.com/  LILLIE parenting center: http://C[employer]/   (668) 293-THZI  Blooma: (education, yoga & wellness) www.UV Flu Technologies  Enlightened Mama: www.enlightenedmama.[place]   Childbirth collective: (Parent topic nights)  www.childbirthcollective.org/  Hypnobabies:  www.hypnobabiestMarket Track.[place]/  Hypnobirthing:  Http://On The Net Yet.[place]/  Hypnobirthing virtual class: www.tinyclues/hypnobirthing    Information about doulas:  Childbirth collective: http://www.childbirthcollective.org/  Doulas of [place] (NUBIA):  www.nubia.org  [facility]  project: http://twincitiesdoulaproject.com/     Early Childhood and Family Education (ECFE):  ECFE offers parents hands-on learning experiences that will nourish a lifetime of teachable moments.  http://ecfe.info/ecfe-home/    APPS and Podcasts:   Emeli Max Nurture    Evidence Based Birth  The Birth Hour (for birth stories)   Birthful   Expectful   The Longest Shortest Time  PregnancyPodcantonia Hurst  https://www.downtobirthshow.com/    Book Recommendations:   Lynne [place]'s Birthing From Within--first few chapters include a new-age tone, you may prefer to skip it and keep going, because there is good stuff later.  This book recommendation covers emotional preparation, but does cover coping with pain, and use of both pharmacological and nonpharmacological methods.    Guide to Childbirth by Kamilah May  "Abhijeet  Childbirth Without Fear by Marco A Montanez Read    Dr. Penaloza' The Pregnancy Book and The Birth Book--the pregnancy book goes month-by month    The Birth Partner by Francisca Estrella    Womanly Art of Breastfeeding by La Leche League International   Bestfeeding by Muriel Vickers--great pictures    Mothering Your Nursing Toddler, by Sylvia Zaldivar.   Addresses dealing with so many of the challenging behaviors of a nursing toddler.  How Weaning Happens, by La Leche League.  Discusses weaning at all ages, from medically necessary weaning of an infant, all the way up to age 5 (or older), with why/why not, and strategies.  Very empowering book both for deciding to wean and deciding not to.    American College of Nurse-Midwives (ACNM) http://www.midwife.org/; look at the informational handouts at http://www.midwife.org/Share-With-Women     www.mymidwife.org    Mother to Baby (Medication and Herbal guidance in pregnancy): http://www.mothertobaby.org  Toll-Free Hotline: 498.483.8206  LactMed (Medication use while breastfeeding): http://toxnet.nlm.nih.gov/newtoxnet/lactmed.htm    Women's Health.gov:  http://www.womenshealth.gov/a-z-topics/index.html    American pregnancy association - http://americanpregnancy.org    Centering Pregnancy (group prenatal care option): http://centeringhealthcare.org    March of Dimes www.Epic!.com     FDA - Nutrition  www.mypyramid.gov  Under \"For Consumers,\" click on \"pregnant and breastfeeding women.\"      Centers for Disease Control and Prevention (CDC) - Vaccines : http://www.cdc.gov/vaccines/       When researching information on the web, question the validity of websites.  The domains .gov, .edu and.org tend to be more reliable information.  If there are a lot of advertisements, be cautious of the information provided. Stay away from blogs and chat rooms please!     Virtual Breastfeeding Support:    During this time of isolation, breastfeeding families need even more community! " " Here are some area organizations offering virtual support groups for breastfeeding:    [employer] Support Group,  at 10:30 am   Run by ADAN Neville of [employer] [facility]   Go to [employer] [employer] Facebook page and click on \"events\" for link   https://www.facebook.com/events/900203250486392/  [facility] Milk Hour,  at 2:30 pm    Run by ADAN Gaviria   Go to [facility] FB page and send message to get link   https://www.Aptara.com/[facility] Centerundations/  [facility] holding virtual meetings the first Tuesday of each month, 8-9 pm, and the   Third Saturday, 10 - 11 am.  Go to [facility] [place] FB page; message to get link [email or web address]://www.Aptara.MIG China/LLLofGoldMikel/?hc_location=[facility] offers a Lactation Lounge every Friday 12pm - 1pm, run by Elvia Pop [facility] Leader   Sign up via link at https://www.Be-Bound/cbe-lactation  [facility] is offering virtual support groups every Monday, 10:30 am - 12 pm, run by nurse IBCLC   Https://www.facebook.com/events/774982914579610/    Prenatal Breastfeeding Classes:      [facility] is offering virtual breastfeeding and  care classes:  https://www.Be-Bound/education-workshops  BirthEd childbirth and breastfeeding education offering virtual prenatal breastfeeding classes  https://www.birthedmn.com/workshops    Breastfeeding clinic visits are at [facility] on , [facility] on  and [facility] on . Call to schedule, [phone].    New Parent Connection:   [place], [address] [facility]  In-person meetings on  from 6 pm - 7:15 pm for parents of  to 9 months, at the same site.   All are free, drop-in, no registration required.    There are also free virtual meetings ongoing on " Tuesdays:  11:30 am - 12:30 pm for parents of newborns to 3 months  4:15 pm to 5:15 pm for parents of 3 to 9-month olds  For joining info parents should call Ivanna Jimenez at [phone]

## 2024-05-24 DIAGNOSIS — R73.09 ELEVATED HEMOGLOBIN A1C: Primary | ICD-10-CM

## 2024-05-29 ENCOUNTER — TELEPHONE (OUTPATIENT)
Dept: MIDWIFE SERVICES | Facility: CLINIC | Age: 24
End: 2024-05-29
Payer: COMMERCIAL

## 2024-05-29 NOTE — TELEPHONE ENCOUNTER
Pt called CNM on call reporting likely viral illness  Nausea and diarrhea starting last night.  Temp 100-101.  Has not taken fever reducing medications.  Some BH type contractions, nothing regular.  No VB/LOF.  States baby has been active.  Advised focus on PO fluids, tylenol prn for fever.  Advance diet as tolerated.  Call if unable to keep food/fluids down x24 hours, high fever despite tylenol or prn.

## 2024-05-31 ENCOUNTER — OFFICE VISIT (OUTPATIENT)
Dept: FAMILY MEDICINE | Facility: CLINIC | Age: 24
End: 2024-05-31
Payer: COMMERCIAL

## 2024-05-31 ENCOUNTER — INFUSION THERAPY VISIT (OUTPATIENT)
Dept: INFUSION THERAPY | Facility: HOSPITAL | Age: 24
End: 2024-05-31
Attending: NURSE PRACTITIONER
Payer: COMMERCIAL

## 2024-05-31 ENCOUNTER — MYC MEDICAL ADVICE (OUTPATIENT)
Dept: MIDWIFE SERVICES | Facility: CLINIC | Age: 24
End: 2024-05-31

## 2024-05-31 VITALS
SYSTOLIC BLOOD PRESSURE: 112 MMHG | RESPIRATION RATE: 16 BRPM | OXYGEN SATURATION: 99 % | DIASTOLIC BLOOD PRESSURE: 70 MMHG | HEART RATE: 97 BPM | TEMPERATURE: 97.7 F

## 2024-05-31 VITALS
WEIGHT: 168.19 LBS | TEMPERATURE: 97.9 F | RESPIRATION RATE: 14 BRPM | DIASTOLIC BLOOD PRESSURE: 76 MMHG | SYSTOLIC BLOOD PRESSURE: 105 MMHG | HEART RATE: 95 BPM | OXYGEN SATURATION: 98 % | BODY MASS INDEX: 25.57 KG/M2

## 2024-05-31 DIAGNOSIS — R11.10 VOMITING AND DIARRHEA: Primary | ICD-10-CM

## 2024-05-31 DIAGNOSIS — A08.31: ICD-10-CM

## 2024-05-31 DIAGNOSIS — R19.7 VOMITING AND DIARRHEA: Primary | ICD-10-CM

## 2024-05-31 DIAGNOSIS — O21.0 HYPEREMESIS GRAVIDARUM: Primary | ICD-10-CM

## 2024-05-31 DIAGNOSIS — Z34.80 SUPERVISION OF OTHER NORMAL PREGNANCY, ANTEPARTUM: Primary | ICD-10-CM

## 2024-05-31 DIAGNOSIS — Z34.80 SUPERVISION OF OTHER NORMAL PREGNANCY, ANTEPARTUM: ICD-10-CM

## 2024-05-31 DIAGNOSIS — A08.0 ROTAVIRUS INFECTION: Primary | ICD-10-CM

## 2024-05-31 LAB
ADV 40+41 DNA STL QL NAA+NON-PROBE: NEGATIVE
ASTRO TYP 1-8 RNA STL QL NAA+NON-PROBE: NEGATIVE
BASOPHILS # BLD AUTO: 0 10E3/UL (ref 0–0.2)
BASOPHILS NFR BLD AUTO: 0 %
C CAYETANENSIS DNA STL QL NAA+NON-PROBE: NEGATIVE
C DIFF TOX B STL QL: NEGATIVE
CAMPYLOBACTER DNA SPEC NAA+PROBE: NEGATIVE
CRYPTOSP DNA STL QL NAA+NON-PROBE: NEGATIVE
E COLI O157 DNA STL QL NAA+NON-PROBE: ABNORMAL
E HISTOLYT DNA STL QL NAA+NON-PROBE: NEGATIVE
EAEC ASTA GENE ISLT QL NAA+PROBE: NEGATIVE
EC STX1+STX2 GENES STL QL NAA+NON-PROBE: NEGATIVE
EOSINOPHIL # BLD AUTO: 0 10E3/UL (ref 0–0.7)
EOSINOPHIL NFR BLD AUTO: 0 %
EPEC EAE GENE STL QL NAA+NON-PROBE: NEGATIVE
ERYTHROCYTE [DISTWIDTH] IN BLOOD BY AUTOMATED COUNT: 14.6 % (ref 10–15)
ETEC LTA+ST1A+ST1B TOX ST NAA+NON-PROBE: NEGATIVE
G LAMBLIA DNA STL QL NAA+NON-PROBE: NEGATIVE
HCT VFR BLD AUTO: 35.6 % (ref 35–47)
HGB BLD-MCNC: 11.5 G/DL (ref 11.7–15.7)
IMM GRANULOCYTES # BLD: 0.1 10E3/UL
IMM GRANULOCYTES NFR BLD: 1 %
LYMPHOCYTES # BLD AUTO: 0.8 10E3/UL (ref 0.8–5.3)
LYMPHOCYTES NFR BLD AUTO: 12 %
MCH RBC QN AUTO: 27.6 PG (ref 26.5–33)
MCHC RBC AUTO-ENTMCNC: 32.3 G/DL (ref 31.5–36.5)
MCV RBC AUTO: 85 FL (ref 78–100)
MONOCYTES # BLD AUTO: 0.8 10E3/UL (ref 0–1.3)
MONOCYTES NFR BLD AUTO: 13 %
NEUTROPHILS # BLD AUTO: 4.7 10E3/UL (ref 1.6–8.3)
NEUTROPHILS NFR BLD AUTO: 73 %
NOROVIRUS GI+II RNA STL QL NAA+NON-PROBE: NEGATIVE
NRBC # BLD AUTO: 0 10E3/UL
NRBC BLD AUTO-RTO: 0 /100
P SHIGELLOIDES DNA STL QL NAA+NON-PROBE: NEGATIVE
PLATELET # BLD AUTO: 288 10E3/UL (ref 150–450)
RBC # BLD AUTO: 4.17 10E6/UL (ref 3.8–5.2)
RVA RNA STL QL NAA+NON-PROBE: POSITIVE
SALMONELLA SP RPOD STL QL NAA+PROBE: NEGATIVE
SAPO I+II+IV+V RNA STL QL NAA+NON-PROBE: POSITIVE
SHIGELLA SP+EIEC IPAH ST NAA+NON-PROBE: NEGATIVE
V CHOLERAE DNA SPEC QL NAA+PROBE: NEGATIVE
VIBRIO DNA SPEC NAA+PROBE: NEGATIVE
WBC # BLD AUTO: 6.5 10E3/UL (ref 4–11)
Y ENTEROCOL DNA STL QL NAA+PROBE: NEGATIVE

## 2024-05-31 PROCEDURE — 87493 C DIFF AMPLIFIED PROBE: CPT | Mod: 59 | Performed by: PHYSICIAN ASSISTANT

## 2024-05-31 PROCEDURE — 85025 COMPLETE CBC W/AUTO DIFF WBC: CPT

## 2024-05-31 PROCEDURE — 87507 IADNA-DNA/RNA PROBE TQ 12-25: CPT | Performed by: PHYSICIAN ASSISTANT

## 2024-05-31 PROCEDURE — 250N000011 HC RX IP 250 OP 636: Performed by: MIDWIFE

## 2024-05-31 PROCEDURE — 99214 OFFICE O/P EST MOD 30 MIN: CPT | Performed by: PHYSICIAN ASSISTANT

## 2024-05-31 PROCEDURE — 96361 HYDRATE IV INFUSION ADD-ON: CPT

## 2024-05-31 PROCEDURE — 36415 COLL VENOUS BLD VENIPUNCTURE: CPT

## 2024-05-31 PROCEDURE — 96374 THER/PROPH/DIAG INJ IV PUSH: CPT

## 2024-05-31 PROCEDURE — 258N000003 HC RX IP 258 OP 636: Performed by: MIDWIFE

## 2024-05-31 RX ORDER — HEPARIN SODIUM,PORCINE 10 UNIT/ML
5-20 VIAL (ML) INTRAVENOUS DAILY PRN
OUTPATIENT
Start: 2024-06-01

## 2024-05-31 RX ORDER — ONDANSETRON 2 MG/ML
4 INJECTION INTRAMUSCULAR; INTRAVENOUS EVERY 8 HOURS PRN
Status: DISCONTINUED | OUTPATIENT
Start: 2024-05-31 | End: 2024-05-31 | Stop reason: HOSPADM

## 2024-05-31 RX ORDER — ONDANSETRON 2 MG/ML
4 INJECTION INTRAMUSCULAR; INTRAVENOUS EVERY 8 HOURS PRN
Start: 2024-06-01

## 2024-05-31 RX ORDER — HEPARIN SODIUM (PORCINE) LOCK FLUSH IV SOLN 100 UNIT/ML 100 UNIT/ML
5 SOLUTION INTRAVENOUS
OUTPATIENT
Start: 2024-06-01

## 2024-05-31 RX ADMIN — SODIUM CHLORIDE, POTASSIUM CHLORIDE, SODIUM LACTATE AND CALCIUM CHLORIDE 1000 ML: 600; 310; 30; 20 INJECTION, SOLUTION INTRAVENOUS at 14:44

## 2024-05-31 RX ADMIN — ONDANSETRON 4 MG: 2 INJECTION INTRAMUSCULAR; INTRAVENOUS at 14:57

## 2024-05-31 NOTE — PROGRESS NOTES
"Infusion Nursing Note:  Litzy Hatch presents today for IV Hydration and Zofran 4 mg IVP.   Patient seen by provider today: No   present during visit today: Not Applicable.    Note: Patient ambulated into Infusion Care by herself. Patient is alert and oriented and VSS. A peripheral IV was inserted into her left forearm and labs drawn.hemoglobin was 11.5. Patient is c/o severe nausea and diarrhea and not being able to eat anything without \"throwing up.\" Patient is 32 weeks pregnant with her second child. LR Fluids administered and 4 mg IVP Zofran. Patient stated that at her last infusion she had a Vasal vagal reaction and \"passed out.\" Fluids were administered at a rate of 100 ml /hour and patient was positioned on her left side with a pillow behind her back. After approx. 30 minutes the fluid rate was increased and VSS. Patient tolerated IV fluids without any problems. She stated she was \"feeling better.\" Peripheral IV was discontinued and a dry 2 x 2 gauze was wrapped with Coban around the insertion site. Patient ambulated to the bathroom and had another episode of diarrhea. Stool specimen was collected and sent to the lab. All questions answered and patient was discharged home.      Intravenous Access:  Peripheral IV placed.    Treatment Conditions:  Lab Results   Component Value Date    HGB 11.5 (L) 05/31/2024    WBC 6.5 05/31/2024    ANEU 2.1 11/04/2020    ANEUTAUTO 4.7 05/31/2024     05/31/2024        Not Applicable.      Post Infusion Assessment:  Patient tolerated infusion without incident.  Site patent and intact, free from redness, edema or discomfort.  No evidence of extravasations.  Access discontinued per protocol.       Discharge Plan:   Patient and/or family verbalized understanding of discharge instructions and all questions answered.  Patient discharged in stable condition accompanied by: self.  Departure Mode: Ambulatory.      Laura Barkley, RN,OCN    "

## 2024-05-31 NOTE — PROGRESS NOTES
Assessment & Plan     Vomiting and diarrhea  Pt was seen for 3 day hx of GI sx of vomiting and diarrhea. Her vomiting is resolved but her diarrhea persists, is nonbloody and is worsening.    She did have a fever initially but also resolved at this time. She has no abdomen pain, back pain, vaginal bleeding, no urinary sx.  She is having increased stanley santamaria contractions which is consistent with her dehydration. She has discussed with her OB team.  Will do stool for enteric cultures. 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    She is agreeable to seek immediate care for worsening sx, high fever, abdomen/back pain, vaginal discharge, progressive contractions.      - Enteric Bacteria and Virus Panel by [facility] Stool  - C. difficile Toxin B PCR with reflex to C. difficile Antigen and Toxins A/B EIA  - Enteric Bacteria and Virus Panel by [facility] Stool  - C. difficile Toxin B PCR with reflex to C. difficile Antigen and Toxins A/B EIA         [facility]    Justin Mcghee is a 24 year old female who presents to clinic today for the following health issues:  Chief Complaint   Patient presents with    Diarrhea     Has had diarrhea and vomiting is 32 weeks pregnant     HPI    Started 5-28-24, 101 fever, diarrhea, vomiting.    Vomiting: improved now.    Diarrhea worsening  TNTC, 20+ episodes, no BRBPR    Dehydrated feeling with dizziness, lightheaded.    Increased contractions : BH.    Leaking stool with contractions.    Dizzy and lightheaded.    No abx in the last several months.    No high risk foods.   No exposures or others at home with similar.   No questionable water supply.    Hx of hyperemesis, got better around 28 weeks with one episode of vomiting in the morning now but  then when this occurred it was up to 3.  No abdomen pain, no dysuria, frequency, urgency or hematuria.        Review of Systems  Constitutional, HEENT, cardiovascular, pulmonary, gi and gu systems are negative, except as otherwise noted.      Objective    /76   Pulse 95   Temp 97.9  F (36.6  C) (Oral)   Resp 14   Wt 76.3 kg (168 lb 3 oz)   LMP 10/15/2023 (Exact Date)   SpO2 98%   BMI 25.57 kg/m    Physical Exam   Nad appears well  Gravid abdomen. Pt arranged to go to [facility] for IVF via her ob care team while here in clinic and they are expecting her now, no additional exam done.

## 2024-06-03 PROBLEM — A08.31: Status: ACTIVE | Noted: 2024-06-03

## 2024-06-03 PROBLEM — A08.0 ROTAVIRUS INFECTION: Status: ACTIVE | Noted: 2024-06-03

## 2024-06-12 ENCOUNTER — PRENATAL OFFICE VISIT (OUTPATIENT)
Dept: MIDWIFE SERVICES | Facility: CLINIC | Age: 24
End: 2024-06-12
Payer: COMMERCIAL

## 2024-06-12 ENCOUNTER — MEDICAL CORRESPONDENCE (OUTPATIENT)
Dept: HEALTH INFORMATION MANAGEMENT | Facility: CLINIC | Age: 24
End: 2024-06-12

## 2024-06-12 ENCOUNTER — TELEPHONE (OUTPATIENT)
Dept: INTERNAL MEDICINE | Facility: CLINIC | Age: 24
End: 2024-06-12

## 2024-06-12 ENCOUNTER — TELEPHONE (OUTPATIENT)
Dept: MIDWIFE SERVICES | Facility: CLINIC | Age: 24
End: 2024-06-12

## 2024-06-12 VITALS
HEART RATE: 103 BPM | SYSTOLIC BLOOD PRESSURE: 102 MMHG | BODY MASS INDEX: 26.37 KG/M2 | DIASTOLIC BLOOD PRESSURE: 62 MMHG | WEIGHT: 173.4 LBS

## 2024-06-12 DIAGNOSIS — O99.810 IMPAIRED GLUCOSE IN PREGNANCY, ANTEPARTUM: ICD-10-CM

## 2024-06-12 DIAGNOSIS — O44.40 LOW-LYING PLACENTA: ICD-10-CM

## 2024-06-12 DIAGNOSIS — Z34.80 SUPERVISION OF OTHER NORMAL PREGNANCY, ANTEPARTUM: Primary | ICD-10-CM

## 2024-06-12 PROCEDURE — 59425 ANTEPARTUM CARE ONLY: CPT | Performed by: ADVANCED PRACTICE MIDWIFE

## 2024-06-12 RX ORDER — PROCHLORPERAZINE 25 MG/1
SUPPOSITORY RECTAL
COMMUNITY
Start: 2024-05-13

## 2024-06-12 RX ORDER — ONDANSETRON 4 MG/1
TABLET, ORALLY DISINTEGRATING ORAL
COMMUNITY
Start: 2024-05-13 | End: 2024-06-21

## 2024-06-12 NOTE — PROGRESS NOTES
Here with her family today for routine prenatal visit at 34w3d. Reports feeling well overall but anxious about unresolved concerns in pregnancy and awaiting next steps. Notes very active FM and BH contractions but no regular UCs. Wondering about continuous glucose monitoring instead of 3 hr GTT due to her elevated A1C last visit. She notes she is currently monitoring and FS: 85-92; 1 hr PP 110s-130s. Diet has been challenging overall this pregnancy due to hyperemesis with increased need for carbs for management so is unsurprised of the elevated hgb A1C. We discussed need for ongoing glucose monitoring with a log and will review each visit. Discussed plan for follow up ultrasound for placental location and growth check; advised to schedule within the week and recommended same day CNM appointment to discuss next steps if placenta continues low lying. Inquired about using an [facility] (her daughters Ped) if need for transfer; advised should contact their clinic and recommended to discuss specific complications to pregnancy including her low lying placenta and any desire for trial of labor rather than planned CS if margin is 1-2cm as well as her election for alternative glucose monitoring and late pregnancy transfer. We reviewed her options here with [facility] and is aware of her options at this time. EPDS = 3; negative #10, no concerns.  Finally, pt states she spoke with her [place] PCP and would like her ferritin level checked. We discussed her care providers and roles in management of her care, reviewed her hgb levels and when we routinely screen for anemia, reviewed her normal levels mid pregnancy and recommendation for next screen with her upcoming visit. She is agreeable to this timing. She has been supplementing oral iron about 2-3 times a week and this is the most is able to tolerate. Advised on upcoming labs and visit schedule. Reviewed warning s/sx and reasons to call. RTC 1 weeks.  [facility] Labs: hgb rescreen  with extra tube (pt is requesting ferritin and TIBC), thyroid levels. GBS is positive and does not require rescreening.

## 2024-06-12 NOTE — PATIENT INSTRUCTIONS
"\"We hope you had a positive experience and that you can definitely recommend [facility] to your family and friends. You ll be receiving a survey soon and we look forward to hearing your feedback\".    [facility]  - Contact information:  Appointment line and to get a hold of CNM in clinic Monday-Friday 8 am - 5 pm:  [phone].  There are some clinics with early start times (1st appointment 7:40 am) and others with evening hours (last appointment 6:20 pm).  Most are typically open from 8 am to 5 pm.    CNM on call answering service: [phone].  Specify your hospital of choice and leave a brief message for CNM;  will then page CNM who is on call at your specified hospital and you should receive a call back with 15 minutes.  Be sure that your ringer is audible and that you can accept blocked calls so that we can get back in touch with you! This number should be reserved for urgent needs if during the day, before 8 am, after 5 pm, weekends, holidays.    Contact the on-call CNM with warning signs, such as:  vaginal bleeding   Vaginal discharge and itching or pain and burning during urination  Leg/calf pain or swelling on one side  severe abdominal pain  nausea and vomiting more than 4-5 times a day, or if you are unable to keep anything down  fever more than 100.4 degrees F.     [place]  After each of your visits you are welcome to check nGAP for your visit summary including education and links to information relevant to your pregnancy and/or well woman care.   Find the \"Visits\" tab at the top of the page, you will see a list of recent visits and for each visit a for link for \"View After Visit Summary.\" View of your After Visit Summary will allow you to read our recommendations from your visit, review any education provided, and link to websites with useful information.   If you have any questions or difficulty navigating Twyxt, please feel free to " "contact us and we will do our best to direct you.  Meet the Midwives from [facility]  You are invited to an informational meet and greet with [facility]'s [facility] Certified Nurse-Midwives. Our free \"Meet the Midwives\" event is a great opportunity to learn about our midwives' philosophy and experience, the hospitals where we can assist with your birth, and answer questions you may have. Partners, friends, and family are welcome to attend. Currently, this is a virtual event.  Date  Last Thursday of every month at 7 pm.    Link to next (live) meeting  https://Real Time TranslationDayton VA Medical CenterCaptual.[email or web address]/meet-the-midwives  To Join by Telephone (audio only) Call:   [phone] Phone Conference ID: [phone] #      Touring the Maternity Care Center  At this time we are offering a virtual tour of the Maternity Care Centers at both [facility] and [facility]:   [facility] Maternity Care:   https://[facility].IBillionaire/locations/the-birthplace-at--[facility] Maternity Care:   [email or web address]://EcoSynthetixCaptual.IBillionaire/locations/[facility]    Scroll to the bottom of the page in this link if the above link does not work.      Breastfeeding and Birth Control  How do I decide what birth control method is best for me while I am breastfeeding?  Choosing a method of birth control is very personal. First, answer the following questions:     Do you want to have more children?   How much spacing between births do you want for your children?   Do you smoke or have you had any health problems, such as liver disease or a blood clot?  Talk about the answers to each of these questions with your health care provider to help you choose the best method for you.    Can I use breastfeeding as my birth control?  Using breastfeeding as your birth control (the lactational amenorrhea method) can be a good way to keep from getting pregnant in the first " months after the baby is born. Each time your baby nurses, your body releases a hormone called prolactin, which stops your body from making the hormones that cause you to ovulate (release an egg). If you are not ovulating, you cannot get pregnant.    The lactational amenorrhea method works only if:   you have not started your period yet.   you are breastfeeding only and not giving your baby any other food or drink.   you are breastfeeding at least every 4 hours during the day and every 6 hours at night.   your baby is less than 6 months old.  When any 1 of these 4 things is not happening, you no longer have good protection from getting pregnant, and you should use another form of birth control.    What birth control methods are safe for me to use while I breastfeed?    Methods without hormones  Methods without hormones do not affect you, your baby, or your breastfeeding.  Methods without hormones that are the most effective   The copper intrauterine contraceptive device (IUD) (ParaGard) is a small, T-shaped device that is in- serted into your uterus (womb) through the vagina and cervix. The copper IUD lasts for 10 years.   Sterilization (getting your tubes tied or your partner having a vasectomy) is very effective, but it is per- manent. You should choose sterilization only if you do not want to have more children.  A method without hormones that is effective   The lactational amenorrhea method described above is effective for the first 6 months.  Methods without hormones that are less effective   Natural family planning is monitoring your body for signs of ovulation and not having sex when you think you are ovulating. This method is reliable only if you are having regular periods every month.   Barrier methods (condoms, diaphragms, sponges, and spermicides) are used at the time you have sex. These methods are effective only if you use them correctly every time.    Methods with hormones  Birth control methods that  use hormones can be used while you are breastfeeding. They may have a small effect on lowering the amount of milk you make. All hormones will get into your breast milk in very small amounts, but there is no known harm to your baby from this small amount of hormone in breast milk.only methodsmethods use only 1 hormone, called progestin. You can start them right after your baby is born or wait 4 to 6 weeks to make sure your milk supply is good.   Progestin-only pills ( minipills ): If you like to take pills every day, you can use the minipill. In order forpill to work well, you have to take 1 at the same time each day. When you stop breastfeeding, you should start pills that have both estrogen and progestin because they are better at keeping you from get- ting pregnant.   Progestin IUD (Mirena): The progestin IUD is shaped and inserted into the uterus like the copper IUD. It works for up to 5 years. Both IUDs are usually inserted 4 to 6 weeks after the baby is born.  Progestin implant (Nexplanon): The progestin implant is a small matchstick-sized flexible favio. It is placed into the fatty tissue in the back of your arm. It works for up to 3 years.  Progestin shot (Depo-Provera): The progestin shot is given every 3 months.    estrogen and progestin methods  These methods use 2 hormones, called estrogen and progestin.     These methods increase your risk of a blood clot, which is already higher than normal after you have a baby. You should not use them until your baby is at least 6 weeks old. The combined methods are not recommended as the first choice for women who are breastfeeding. If a combined method is the one that you feel will be best for you to prevent getting pregnant, these methods are okay to use while breastfeeding.   Combined birth control pills: You take a pill each day.  Vaginal ring (NuvaRing): The ring is worn in the vagina for 3 weeks then left out for 1 week before youin a new ring.  Patch (Ortho  Aquilino): The patch is placed on your skin and changed every week for 3 weeks then left off forweek before putting a new patch on a different area of your skin.    Pediatric Care Providers at [facility]:    Choosing the right provider is one of the most important decisions you ll make about your health care. We can help you find the right one. Remember, you re looking for a provider you can trust and work with to improve your health and well-being, so take time to think about what you need. Depending on how complicated your health care needs are, you may need to see more than one type of provider.    Primary Care Providers: You ll see a primary care provider first for most health issues. They ll work with you to get your recommended screenings, help you manage chronic conditions, and refer you to other types of providers if you need them. Your primary care provider may be called a family physician or doctor, internist, general practitioner, nurse practitioner, or physician s assistant. Your child or teenager s provider may be called a pediatrician.  Specialists: You ll see a specialist for certain services or to treat specific conditions. Specialists include cardiologists, oncologists, psychologists, allergists, podiatrists, and orthopedists. You may need a referral from your primary care provider before you go to a specialist in order for your health plan to pay for your visit.\Williamere are some tips for finding a provider where you live:  If you already have a provider you like and want to keep working with, call their office and ask if they accept your coverage.  Call your insurance company or state Medicaid and CHIP program. Look at their website or check your member handbook to find providers in your network who take your health coverage.  Ask your friends or family if they have providers they like and use these tools to compare health care providers in your area.    Family Medicine at  [facility]:      https://www.[place].org/specialties/family-medicine    Many of our families enjoy all seeing the same doctor, who comes to know the whole family very well. We base our practice on the knowledge of the patient in the context of family and community.  WHY CHOOSE A FAMILY MEDICINE PHYSICIAN?  Ability of the whole family to see the same doctor  Focus on the whole person, including physical and emotional health  A personal relationship with their doctor that is nurtured over time  Respect for individual and family beliefs and values  No need to change primary care providers when a certain age is reached  Coordination of care when other health care services are needed    Pediatrics at  [facility]:   https://www.[place].org/specialties/pediatric-care    Through a teaching affiliation with the [facility], [facility] staff keeps current on new developments in the field of pediatrics.     Everything we do centers around caring for children. We place special emphasis on wellness and prevention.pediatric care team includes a team of pediatricians and certified nurse practitioners who provide care to pediatric and adolescent patients ages 0 to 18, and some up to the age of 26. We offer preventive health maintenance for healthy children as well the diagnosis and treatment of common and chronic illnesses and injuries. In addition, we also offer several pediatric specialists who focus on adolescent health issues and developmental and behavioral issues.    Circumcision    Educational video:     [email or web address]    For More Information  American Academy of Family Physicians: Circumcision  http://familydoctor.org/familydoctor/en/pregnancy-newborns/caring-for-newborns/infant- care/circumcision.html  MedlinePlus: Circumcision (includes a slide show on the procedure)  www.nlm.nih.gov/medlineplus/circumcision.html  American Academy of Pediatrics:  Policy statement on circumcision  Http://pediatrics.aappublications.org/content/130/3/e756.abstract      Childbirth and Parenting Education:     Everyday Miracles:   https://www.everyday-miracles.org/    Free Video Series from [facility]: https://nursing.[facility]/academics/specialty-areas/nurse-midwifery/having-baby-prenatal-videos/having-baby-prenatal-and    Childbirth Education virtual (live) classes: www.[facility]/classes  The Birth Hour: https://[employer]/online-childbirth-class/  BirthED: https://www.birthedmn.com/  LILLIE parenting center: http://Fifty100/   ([phone] 979-LZXE  Blooma: (education, yoga & wellness) www.[employer]ent[employer] Capital  Enlightened Mama: www.enlightenedmama.Sarenza   Childbirth collective: (Parent topic nights)  www.childbirthcollective.org/  Hypnobabies:  www.hypnobabiestPrevalent Networks.Sarenza/  Hypnobirthing:  Http://Results United.Sarenza/  Hypnobirthing virtual class: www.Collegebound Bus/hypnobirthing    Information about doulas:  Childbirth collective: http://www.childbirthcollective.org/  Doulas of [place] (NUBIA):  www.nubia.org  [facility]  project: http://twincitiesdoulaproject.com/     Early Childhood and Family Education (ECFE):  ECFE offers parents hands-on learning experiences that will nourish a lifetime of teachable moments.  http://ecfe.info/ecfe-home/    APPS and Podcasts:   Emeli Max Nurture    Evidence Based Birth  The Birth Hour (for birth stories)   Birthful   Expectful   The Longest Shortest Time  PregnancyPodcantonia Hurst  https://www.downtobirthshow.com/    Book Recommendations:   Lynne Machias's Birthing From Within--first few chapters include a new-age tone, you may prefer to skip it and keep going, because there is good stuff later.  This book recommendation covers emotional preparation, but does cover coping with pain, and use of both pharmacological and nonpharmacological methods.    Guide to Childbirth by Kamilah May  "Abhijeet  Childbirth Without Fear by Marco A Montanez Read    Dr. Penaloza' The Pregnancy Book and The Birth Book--the pregnancy book goes month-by month    The Birth Partner by Francisca Estrella    Womanly Art of Breastfeeding by La Leche League International   Bestfeeding by Muriel Vickers--great pictures    Mothering Your Nursing Toddler, by Sylvia Zaldivar.   Addresses dealing with so many of the challenging behaviors of a nursing toddler.  How Weaning Happens, by La Leche League.  Discusses weaning at all ages, from medically necessary weaning of an infant, all the way up to age 5 (or older), with why/why not, and strategies.  Very empowering book both for deciding to wean and deciding not to.    American College of Nurse-Midwives (ACNM) http://www.midwife.org/; look at the informational handouts at http://www.midwife.org/Share-With-Women     www.mymidwife.org    Mother to Baby (Medication and Herbal guidance in pregnancy): http://www.mothertobaby.org  Toll-Free Hotline: 382.121.7894  LactMed (Medication use while breastfeeding): http://toxnet.nlm.nih.gov/newtoxnet/lactmed.htm    Women's Health.gov:  http://www.womenshealth.gov/a-z-topics/index.html    American pregnancy association - http://americanpregnancy.org    Centering Pregnancy (group prenatal care option): http://centeringhealthcare.org    March of Dimes www.PalsUniverse.com.com     FDA - Nutrition  www.mypyramid.gov  Under \"For Consumers,\" click on \"pregnant and breastfeeding women.\"      Centers for Disease Control and Prevention (CDC) - Vaccines : http://www.cdc.gov/vaccines/       When researching information on the web, question the validity of websites.  The domains .gov, .edu and.org tend to be more reliable information.  If there are a lot of advertisements, be cautious of the information provided. Stay away from blogs and chat rooms please!     Virtual Breastfeeding Support:    During this time of isolation, breastfeeding families need even more community! " " Here are some area organizations offering virtual support groups for breastfeeding:    [employer] Support Group,  at 10:30 am   Run by ADAN Neville of [employer] [facility]   Go to [employer] Facebook page and click on \"events\" for link   https://www.facebook.com/events/955019624156354/  [facility] Milk Hour,  at 2:30 pm    Run by ADAN Gaviria   Go to [facility] FB page and send message to get link   https://www.Bensussen Deutsch.com/[facility] Hospitalundations/  [facility] holding virtual meetings the first Tuesday of each month, 8-9 pm, and the   Third Saturday, 10 - 11 am.  Go to [facility] FB page; message to get link https://www.Bensussen Deutsch.NextCapital/LLLofGoldMikel/?hc_location=[facility] offers a Lactation Lounge every Friday 12pm - 1pm, run by Elvia Pop [facility] Leader   Sign up via link at https://www.LetsVenture/cbe-lactation  [facility] is offering virtual support groups every Monday, 10:30 am - 12 pm, run by nurse IBCLC   Https://www.facebook.com/events/329862013509692/    Prenatal Breastfeeding Classes:      [facility] is offering virtual breastfeeding and  care classes:  https://www.LetsVenture/education-workshops  BirthEd childbirth and breastfeeding education offering virtual prenatal breastfeeding classes  https://www.birthedmn.com/workshops    Breastfeeding clinic visits are at [facility] on , [facility] on  and [facility] on . Call to schedule, [phone].    New Parent Connection:   [place], [id number] [facility]  In-person meetings on  from 6 pm - 7:15 pm for parents of  to 9 months, at the same site.   All are free, drop-in, no registration required.    There are also free virtual meetings ongoing on " Tuesdays:  11:30 am - 12:30 pm for parents of newborns to 3 months  4:15 pm to 5:15 pm for parents of 3 to 9-month olds  For joining info parents should call Ivanna Jimenez at [phone]

## 2024-06-12 NOTE — TELEPHONE ENCOUNTER
Incoming requests for written orders for sacroiliac support, breast pump, and compression stockings. All three signed by Anabella Olguin CNM. [facility] sent to be scanned to patient's chart.

## 2024-06-17 NOTE — TELEPHONE ENCOUNTER
I would be happy to help take on her care. Can We help work her into my schedule?   I have a prenatal spot on July 3 that is available if she wants that or I could work her in over the lunch spot Tuesday (tomorrow) during the meeting or ok to DB in AM or PM that day? Not sure when her next imaging is planned but we should do that around this timeframe (35/36 weeks) if her midwife team has already ordered that or if she is followed by MFM already for that?

## 2024-06-18 ENCOUNTER — THERAPY VISIT (OUTPATIENT)
Dept: PHYSICAL THERAPY | Facility: REHABILITATION | Age: 24
End: 2024-06-18
Payer: COMMERCIAL

## 2024-06-18 DIAGNOSIS — G44.209 TENSION HEADACHE: Primary | ICD-10-CM

## 2024-06-18 DIAGNOSIS — M79.18 MYOFASCIAL PAIN: ICD-10-CM

## 2024-06-18 PROCEDURE — 97161 PT EVAL LOW COMPLEX 20 MIN: CPT | Mod: GP | Performed by: PHYSICAL THERAPIST

## 2024-06-18 PROCEDURE — 97140 MANUAL THERAPY 1/> REGIONS: CPT | Mod: GP | Performed by: PHYSICAL THERAPIST

## 2024-06-18 NOTE — PROGRESS NOTES
PHYSICAL THERAPY EVALUATION  Type of Visit: Evaluation       Fall Risk Screen:  Fall screen completed by: PT  Have you fallen 2 or more times in the past year?: No  Have you fallen and had an injury in the past year?: No  Is patient a fall risk?: No    Subjective       Presenting condition or subjective complaint: Tension headaches. She has been much better overall. They are ranging from 3-6/10. It has only the been the last week or two. It is worse in the mornings and does get better as the day progresses. It is more left sided. She does get some neck pain and down into her shoulder a little too.   Date of onset:      Relevant medical history:   pregnant - due in the next month  Dates & types of surgery:      Prior diagnostic imaging/testing results: Other     Prior therapy history for the same diagnosis, illness or injury: Yes      Prior Level of Function  Transfers:   Ambulation:   ADL:   IADL:     Living Environment  Social support: With a significant other or spouse   Type of home: House   Stairs to enter the home: Yes   Is there a railing: No    Ramp: No   Stairs inside the home: Yes       Help at home: None  Equipment owned:       Employment:      Hobbies/Interests:      Patient goals for therapy:      Pain assessment:      Objective   CERVICAL SPINE EVALUATION  PAIN: Pain Level at Rest: 3/10  Pain Level with Use: 6/10  Pain Location: left sided head/neck pain  INTEGUMENTARY (edema, incisions):   POSTURE:   GAIT:   Weightbearing Status:   Assistive Device(s):   Gait Deviations:   BALANCE/PROPRIOCEPTION:   WEIGHTBEARING ALIGNMENT:   ROM:   (Degrees) Left AROM Right AROM    Cervical Flexion     Cervical Extension     Cervical Side bend      Cervical Rotation 77 80    Cervical Protrusion     Cervical Retraction     Thoracic Flexion     Thoracic Extension     Thoracic Rotation       Left AROM Left PROM Right AROM Right PROM   Shoulder Flexion WFL  WFL    Shoulder Extension       Shoulder Abduction WFL  WFL     Shoulder Adduction       Shoulder IR       Shoulder ER       Shoulder Horiz Abduction       Shoulder Horiz Adduction       Pain:   End Feel:     MYOTOMES:   All WFL    DTR S:   CORD SIGNS:   DERMATOMES:   NEURAL TENSION:   FLEXIBILITY:    SPECIAL TESTS:   PALPATION: hypomobile fascia of cranium, CT spine and ribs.   SPINAL SEGMENTAL CONCLUSIONS:       Assessment & Plan   CLINICAL IMPRESSIONS  Medical Diagnosis: tension headache    Treatment Diagnosis:     Impression/Assessment: Patient is a 24 year old female with headaches/myofascial pain complaints.  The following significant findings have been identified: Pain and Decreased ROM/flexibility. These impairments interfere with their ability to perform self care tasks, work tasks, recreational activities, and household chores as compared to previous level of function.     Clinical Decision Making (Complexity):  Clinical Presentation: Stable/Uncomplicated  Clinical Presentation Rationale: based on medical and personal factors listed in PT evaluation  Clinical Decision Making (Complexity): Low complexity    PLAN OF CARE  Treatment Interventions:  Interventions: Manual Therapy, Neuromuscular Re-education, Therapeutic Activity, Therapeutic Exercise    Long Term Goals            Frequency of Treatment:    Duration of Treatment:      Recommended Referrals to Other Professionals:   Education Assessment:        Risks and benefits of evaluation/treatment have been explained.   Patient/Family/caregiver agrees with Plan of Care.     Evaluation Time:             Signing Clinician: JAMES RODRIGUEZ, PT

## 2024-06-19 ENCOUNTER — PRENATAL OFFICE VISIT (OUTPATIENT)
Dept: FAMILY MEDICINE | Facility: CLINIC | Age: 24
End: 2024-06-19
Payer: COMMERCIAL

## 2024-06-19 VITALS
SYSTOLIC BLOOD PRESSURE: 112 MMHG | WEIGHT: 173 LBS | HEART RATE: 91 BPM | BODY MASS INDEX: 26.3 KG/M2 | DIASTOLIC BLOOD PRESSURE: 68 MMHG | OXYGEN SATURATION: 99 %

## 2024-06-19 DIAGNOSIS — O09.93 SUPERVISION OF HIGH RISK PREGNANCY IN THIRD TRIMESTER: ICD-10-CM

## 2024-06-19 DIAGNOSIS — F41.1 GAD (GENERALIZED ANXIETY DISORDER): ICD-10-CM

## 2024-06-19 DIAGNOSIS — O24.410 DIET CONTROLLED GESTATIONAL DIABETES MELLITUS (GDM) IN THIRD TRIMESTER: ICD-10-CM

## 2024-06-19 DIAGNOSIS — R76.8 ANTI-TPO ANTIBODIES PRESENT: ICD-10-CM

## 2024-06-19 DIAGNOSIS — E06.3 HASHIMOTO'S THYROIDITIS: ICD-10-CM

## 2024-06-19 DIAGNOSIS — O99.810 IMPAIRED GLUCOSE IN PREGNANCY, ANTEPARTUM: ICD-10-CM

## 2024-06-19 DIAGNOSIS — O21.0 HYPEREMESIS GRAVIDARUM: ICD-10-CM

## 2024-06-19 DIAGNOSIS — Z76.89 ENCOUNTER TO ESTABLISH CARE: Primary | ICD-10-CM

## 2024-06-19 DIAGNOSIS — O44.40 LOW-LYING PLACENTA: ICD-10-CM

## 2024-06-19 PROCEDURE — 99207 PR COMPLICATED OB VISIT: CPT | Performed by: FAMILY MEDICINE

## 2024-06-19 PROCEDURE — 99417 PROLNG OP E/M EACH 15 MIN: CPT | Performed by: FAMILY MEDICINE

## 2024-06-19 PROCEDURE — 99215 OFFICE O/P EST HI 40 MIN: CPT | Mod: 25 | Performed by: FAMILY MEDICINE

## 2024-06-19 NOTE — PATIENT INSTRUCTIONS
- Discussed that blood glucose monitoring should be performed at least four times daily (fasting and one or two hours after the first bite of each meal) for the first two weeks after diagnosis   ?Fasting blood glucose concentration: <95 mg/dL (5.3 mmol/L)   ?One-hour postprandial blood glucose concentration: <140 mg/dL (7.8 mmol/L)   ?Two-hour postprandial glucose concentration: <120 mg/dL (6.7 mmol/L)    Please call [facility] Labor & Delivery at [phone] to speak to a nurse if you think you may be in labor or if your water ruptures or if you have any other urgent questions at any time of day or night.     Check out the main website for a VIRTUAL TOUR!!    You can call the radiology department at [phone] to schedule your imaging test that was ordered.      Non-Constipating Iron Supplement:    Gentle Iron by [employer] (Iron bisglycinate) 25mg once daily.  It is formulated with vegetable based iron sources and can be found at the [employer] in [place] and probably available online too. It comes in a brown/dipak glass container with gold label/lid.     You can also increase your iron levels by eating more iron in your diet. Good sources of iron are actually iron-fortified cereals (like All Bran, or Cream of Wheat). Iron can be found in red meats such as beef, steak, liver; as well as almonds, lentil beans, chickpeas, green leafy vegetables like spinach, peaches, prune juice and raisins.       Thanks,  La Nena Hightower MD

## 2024-06-19 NOTE — PROGRESS NOTES
CHRISTINE  35w3d  Estimated Date of Delivery: Jul 21, 2024    Chief Complaint   Patient presents with    Prenatal Care     35 weeks         S: Here today to discuss transfer of care from midwife group to [facility] with myself as I help care for her daughter Mally and she indicates goal for more consistency with remaining OB visits. Reviews that every appointment they have needed to revisit her health issues due to change in providers at a lot of visits with that model of care.     Pregnancy complicated by low lying placenta dx at 20wk ultrasound; still at 1.8cm from cervix as of last scan around 30 weeks.   Due to have this repeated    Hyperemesis has been a big issue this pregnancy too- zofran once daily at this point    Anxiety worse lately; doing therapy now with an amazing therapist.     She is really looking for more continuity going forward with her pregnancy    1hr GTT passed with BS of 69  She is using a CGM to check glucose levels as A1c noted to be 5.9% on 5/22.  (5.7% prior to pregnancy). She didn't want to do the 3hr test due to severe nausea and anxiety around this with hx hyperemesis  A1c elevated due to needing to eat a lot of snacks for her hyperemesis   Fastings: 87-95 or less usually, occasional 97   1hr post prandial : 110s usually  2hr post prandials: less than 100    Taking ferrasorb iron bisglycinate     Had rotavirus + on stool testing 5/31    Subclinical hypothyroidism, Hashimoto's thyroiditis; I reviewed labs and looks like she has had positive TPO antibodies as a high as 228 in March. (Present since 2020 off and on)  Taking synthroid since 2022  Last saw endocrinology 10/2022    IgA deficiency - this is not an endocrine condition but it can be associated with autoimmune thyroid disease.  She reports she is going to see immunologist.       Celiac disease may be present - as above.  History of SIBO .  She is gluten free.    Latest Reference Range & Units 03/27/24 10:43 05/22/24 13:38   TSH 0.30 - 4.20  uIU/mL 2.14 1.66       + fetal movement.   No vaginal bleeding during the pregnancy, cramping, LOF.   No headaches, vision change, RUQ abdominal pain, LE swelling.       O: Vitals reviewed, see prenatal flowsheet for measurements.   appears well, no acute distress.  Normal respiratory effort.  Abdomen is soft. No LE swelling.   Infant is vertex by bedside ultrasound    A/P:     Encounter to establish care  Supervision of high risk pregnancy- low lying placenta  Reviewed medical hx and pregnancy episode information via available EMR records.    Reviewed options for care in scope of [facility] practice vs ob/gyn. She is agreeable to the goals reviewed today and consistent prenatal care and ideally present at delivery.     Feeding plans/breast pump: breast   Provider chosen: Dr. Campbell ALVES BCM/family planning: [place] method, used in the past as well.  Birth plan/preferences: Reviewed birth plan today and will scan into chart. Hoping for un medicated birth, hydrotherapy, Magallanes hour.  Car seat: yes  Circumcision if boy: Likely no.     - RSV vaccine declined for now; prefers mAB RSV for baby after birth  - GBS + in urine earlier in pregnancy; will treat with abx ppx during labor   - O positive, didn't need rhogam this pregnancy    Low-lying placenta  Reviewed goal to recheck placental location now; last scan done @ 30 weeks.   We reviewed pending this measurement (last at 1.8cm) we will need to have further risk/benefit conversation regarding trial of  vs . We reviewed uptodate numbers regarding risk for emergency c-s when <1cm is much higher compared to when the range is 1-2cm vs after 2cm from os. She prefers vaginal route if at all possible but understands risk for hemorrhage etc and we will continue this conversation at next visit after follow up ultrasound. She was given option to meet with [facility] though for now felt she was provided enough information at this time.   - US OB >14 Weeks Follow Up;  Future    Impaired glucose in pregnancy, antepartum  Diet controlled gestational PREdiabetes mellitus (GDM) in third trimester  A1c 5.9% in 2nd trimester and using CGM to collect BS readings for fasting/post prandials. Suspect prediabetes range levels based on essentially normal but upper end normal fasting blood sugars with the occasional fasting at 35. For now declines need for dietary visit and watching sugars closely with CGM. Understands this is a valid way to check sugars however not currently used as a standard in pregnancy management. She is open to treating this as GDM diet controlled   - Discussed that blood glucose monitoring should be performed at least four times daily (fasting and one or two hours after the first bite of each meal) for the first two weeks after diagnosis   ?Fasting blood glucose concentration: <95 mg/dL (5.3 mmol/L)   ?One-hour postprandial blood glucose concentration: <140 mg/dL (7.8 mmol/L)   ?Two-hour postprandial glucose concentration: <120 mg/dL (6.7 mmol/L)  -Discussed that no increase in  fetal testing as indicated for this dx.  -Discussed that a single third trimester ultrasound screen for macrosomia is recommended between 36-37 weeks gestational age and this was ordered  -Discussed that if there is a concern for fetal macrosomia, we could offer   -Discussed that we offer induction at 40 weeks and recommend induction at 41 weeks  -Discussed that patient will need a 2 hour oral glucose tolerance test between 6 and 12 weeks postpartum  (or repeat A1c by her preference/CGM due to concern for vomiting)      Hashimoto's thyroiditis  Anti-TPO antibodies present  Has previously been seen by endocrinology though not currently; declines lab recheck today but open for next week. TSH has been stable on current synthoid dose.     Hyperemesis gravidarum  Stable at this point; on zofran about once daily; has needed IV fluids    CHRISTINA (generalized anxiety disorder)  Stable at  this time; suboptimal overall this pregnancy  - has prn hydroxyzine for panic  - declines selective serotonin reuptake inhibitor for now  - has a great therapist she continues with        67 minutes spent on the date of the encounter doing chart review, patient visit and documentation.      La Nena Hightower MD  Follow up: 2 weeks, then weekly     La Nena Hightower MD  \

## 2024-06-20 ENCOUNTER — MEDICAL CORRESPONDENCE (OUTPATIENT)
Dept: HEALTH INFORMATION MANAGEMENT | Facility: CLINIC | Age: 24
End: 2024-06-20
Payer: COMMERCIAL

## 2024-06-21 DIAGNOSIS — O21.9 NAUSEA AND VOMITING OF PREGNANCY, ANTEPARTUM: Primary | ICD-10-CM

## 2024-06-21 RX ORDER — ONDANSETRON 4 MG/1
TABLET, ORALLY DISINTEGRATING ORAL
Qty: 90 TABLET | Refills: 1 | Status: SHIPPED | OUTPATIENT
Start: 2024-06-21 | End: 2024-08-29

## 2024-06-24 ENCOUNTER — HOSPITAL ENCOUNTER (OUTPATIENT)
Dept: ULTRASOUND IMAGING | Facility: CLINIC | Age: 24
Discharge: HOME | End: 2024-06-24
Attending: FAMILY MEDICINE | Admitting: FAMILY MEDICINE
Payer: COMMERCIAL

## 2024-06-24 DIAGNOSIS — O44.40 LOW-LYING PLACENTA: ICD-10-CM

## 2024-06-24 PROCEDURE — 76816 OB US FOLLOW-UP PER FETUS: CPT

## 2024-07-03 ENCOUNTER — PRENATAL OFFICE VISIT (OUTPATIENT)
Dept: FAMILY MEDICINE | Facility: CLINIC | Age: 24
End: 2024-07-03
Payer: COMMERCIAL

## 2024-07-03 VITALS
WEIGHT: 171.9 LBS | HEIGHT: 68 IN | RESPIRATION RATE: 16 BRPM | HEART RATE: 80 BPM | BODY MASS INDEX: 26.05 KG/M2 | DIASTOLIC BLOOD PRESSURE: 76 MMHG | TEMPERATURE: 98 F | SYSTOLIC BLOOD PRESSURE: 109 MMHG | OXYGEN SATURATION: 98 %

## 2024-07-03 DIAGNOSIS — F41.0 PANIC ATTACK: ICD-10-CM

## 2024-07-03 DIAGNOSIS — O09.93 SUPERVISION OF HIGH RISK PREGNANCY IN THIRD TRIMESTER: Primary | ICD-10-CM

## 2024-07-03 DIAGNOSIS — O44.40 LOW-LYING PLACENTA: ICD-10-CM

## 2024-07-03 DIAGNOSIS — F41.1 GAD (GENERALIZED ANXIETY DISORDER): ICD-10-CM

## 2024-07-03 DIAGNOSIS — O24.410 DIET CONTROLLED GESTATIONAL DIABETES MELLITUS (GDM) IN THIRD TRIMESTER: ICD-10-CM

## 2024-07-03 DIAGNOSIS — R76.8 ANTI-TPO ANTIBODIES PRESENT: ICD-10-CM

## 2024-07-03 DIAGNOSIS — O21.0 HYPEREMESIS GRAVIDARUM: ICD-10-CM

## 2024-07-03 DIAGNOSIS — E06.3 HASHIMOTO'S THYROIDITIS: ICD-10-CM

## 2024-07-03 PROBLEM — A08.0 ROTAVIRUS INFECTION: Status: RESOLVED | Noted: 2024-06-03 | Resolved: 2024-07-03

## 2024-07-03 LAB
FERRITIN SERPL-MCNC: 15 NG/ML (ref 6–175)
HGB BLD-MCNC: 11.7 G/DL (ref 11.7–15.7)
TSH SERPL DL<=0.005 MIU/L-ACNC: 2.01 UIU/ML (ref 0.3–4.2)

## 2024-07-03 PROCEDURE — 82728 ASSAY OF FERRITIN: CPT | Performed by: FAMILY MEDICINE

## 2024-07-03 PROCEDURE — 36415 COLL VENOUS BLD VENIPUNCTURE: CPT | Performed by: FAMILY MEDICINE

## 2024-07-03 PROCEDURE — 99207 PR COMPLICATED OB VISIT: CPT | Performed by: FAMILY MEDICINE

## 2024-07-03 PROCEDURE — 87653 STREP B DNA AMP PROBE: CPT | Performed by: FAMILY MEDICINE

## 2024-07-03 PROCEDURE — 99215 OFFICE O/P EST HI 40 MIN: CPT | Mod: 25 | Performed by: FAMILY MEDICINE

## 2024-07-03 PROCEDURE — 85018 HEMOGLOBIN: CPT | Performed by: FAMILY MEDICINE

## 2024-07-03 PROCEDURE — 84443 ASSAY THYROID STIM HORMONE: CPT | Performed by: FAMILY MEDICINE

## 2024-07-03 RX ORDER — METOCLOPRAMIDE 5 MG/1
TABLET ORAL
COMMUNITY
Start: 2024-06-21 | End: 2024-08-29

## 2024-07-03 RX ORDER — PROCHLORPERAZINE 25 MG/1
SUPPOSITORY RECTAL
COMMUNITY
Start: 2024-06-21

## 2024-07-03 RX ORDER — PROMETHAZINE HYDROCHLORIDE 25 MG/1
TABLET ORAL
COMMUNITY
Start: 2024-06-21 | End: 2024-08-29

## 2024-07-03 RX ORDER — HYDROXYZINE HYDROCHLORIDE 25 MG/1
25-50 TABLET, FILM COATED ORAL EVERY 8 HOURS PRN
Qty: 30 TABLET | Refills: 1 | Status: SHIPPED | OUTPATIENT
Start: 2024-07-03 | End: 2024-08-29

## 2024-07-03 ASSESSMENT — PAIN SCALES - GENERAL: PAINLEVEL: NO PAIN (0)

## 2024-07-03 NOTE — PATIENT INSTRUCTIONS
Options for constipation:    Increase fluids (60-80oz fluids/24hr)  Increase dietary fiber (25-35 grams/day). Grape Nuts or Raisin Bran cereals; Prunes, raisins or 4oz of prune/apple  juice daily are great options.  Increase movement (exercising/walking helps digestion)  Try Medication options available over the counter at your pharmacy:  Start medication version of fiber: metamucil/psyllium once daily in a large 8oz glass of water  Colace (stool softener): Take 1 or 2 times daily to keep stools soft  Miralax (laxative): Take 1 capful daily to maintain soft regular stools, and take up to 2-3 times daily for a few days,if constipation is worsening  Latasha-colace (Colace softener + Senna stimulant) once daily- this would be used INSTEAD of the plain colace if you need stimulant    Milk of Magnesium or magnesium citrate as needed every few days  Glycerin suppository as needed      Bowel Cleanse option if severe:  Mix 64 ounces of Gatorade with 8.3 ounces of MiraLAX.  Drink 1 cup every 15 minutes until finished.  After the cleanse, please start MiraLAX 1 capful daily.        Please call [facility] at [phone] to speak to a nurse if you think you may be in labor or if your water ruptures or if you have any other urgent questions at any time of day or night.     Check out the main website for a VIRTUAL TOUR!!  Please note during COVID certain modalities may or may not be available (such as water birth, though tubs for laboring are available).  [email or web address]

## 2024-07-03 NOTE — PROGRESS NOTES
CHRISTINE  37w3d  Estimated Date of Delivery: Jul 21, 2024      S: Reviewed placental location  has now improved to 3.1cm away from os. This confirms RESOLUTION of the low lying placenta given distance >2cm from os. Pt very appreciative for this outcome.     Baby has moved lower, less heartburn  Anxiety high; used hydroxyzine today which she found helpful  Nausea/vomiting persist; zofran in AM after breakfast helps; thinks anxiety is higher contributor now and hydroxyzine helpful; has reglan to try again at home (took a break from this after 14wks of use; stopped last use @~ 20 wks)     She strongly prefers to recheck GBS because she is at an intersection of understanding risks/benefits of abx- very concerned her need for abx might flare her SIBO and already has had 9 months vomiting (from pregnancy) and disordered eating as a result and doesn't want to feel miserable again from the SIBO type symptoms; she is also very aware for risk with a family member losing their baby at 1day old from sepsis. She wants to consider GBS recheck for these reasons.    Continues to have stanley diane often 2-3x/hr; has tried to optimize with fluids/electrolytes    Hx of fast labor with her first baby- 24hr labor overall   39 week visit was 2cm; back pain/early labors  She labored so mildly it seemed that she didn't realize she actually got to complete at  home  eventually and felt vertex/sac or head (so she was complete but didn't realize it at the time).  Delivered within 10min of hospital arrival- in just 2 ctx which were 6 min apart.  She didn't realize at the time she was bearing down/pushing and eating dinner between      + fetal movement.   No vaginal bleeding, cramping, LOF.   No headaches, vision change, RUQ abdominal pain, LE swelling.       O: Vitals reviewed, see prenatal flowsheet for measurements.   appears well, no acute distress.  Normal respiratory effort.  Abdomen is soft. No LE swelling.   Cephalic by bedside  ultrasound  Defers cervical exma    A/P:     Supervision of high risk pregnancy- low lying placenta     Feeding plans/breast pump: breast  [place] Provider chosen: Dr. Campbell ALVES BCM/family planning: [place] method, used in the past as well.  Birth plan/preferences: Reviewed birth plan today and will scan into chart. Hoping for un medicated birth, hydrotherapy, Magallanes hour.  Car seat: yes  Circumcision if boy: Likely no.      - RSV vaccine declined for now; prefers mAB RSV for baby after birth  - GBS + in urine earlier in pregnancy; pt prefers recheck sent today; she understands the recommendations for labor treatment regardless of this result though per HPI has concerns for abx exposure and well aware of risks.   - O positive, didn't need rhogam this pregnancy     Low-lying placenta--> resolved now @ 36wk scan  3.1cm from os (>2cm goal)  Anticipate         Impaired glucose in pregnancy, antepartum  Diet controlled gestational PREdiabetes mellitus (GDM) in third trimester  A1c 5.9% in 2nd trimester and using CGM to collect BS readings for fasting/post prandials. Suspect prediabetes range levels based on essentially normal but upper end normal fasting blood sugars with the occasional fasting at 35. For now declines need for dietary visit and watching sugars closely with CGM. Understands this is a valid way to check sugars however not currently used as a standard in pregnancy management. She is open to treating this as GDM diet controlled   - Discussed that blood glucose monitoring should be performed at least four times daily (fasting and one or two hours after the first bite of each meal) for the first two weeks after diagnosis              ?Fasting blood glucose concentration: <95 mg/dL (5.3 mmol/L)              ?One-hour postprandial blood glucose concentration: <140 mg/dL (7.8 mmol/L)              ?Two-hour postprandial glucose concentration: <120 mg/dL (6.7 mmol/L)  -Discussed that no increase in   fetal testing as indicated for this dx.  -Discussed that a single third trimester ultrasound screen for macrosomia is recommended between 36-37 weeks gestational age and this was ordered  -Discussed that if there is a concern for fetal macrosomia, we could offer   -Discussed that we offer induction at 40 weeks and recommend induction at 41 weeks  -Discussed that patient will need a 2 hour oral glucose tolerance test between 6 and 12 weeks postpartum  (or repeat A1c by her preference/CGM due to concern for vomiting)        Hashimoto's thyroiditis  Anti-TPO antibodies present  Has previously been seen by endocrinology though not currently. TSH has been stable on current synthoid dose.   - desires recheck today      Hyperemesis gravidarum  Stable at this point; on zofran about once daily; has needed IV fluids   - optimize anxiety, trial Reglan again if desired    CHRISTINA (generalized anxiety disorder)  Stable at this time; suboptimal overall this pregnancy  - has prn hydroxyzine for panic  - declines selective serotonin reuptake inhibitor for now  - has a great therapist she continues with     44 minutes spent on the date of the encounter doing chart review, patient visit and documentation.      Follow up: weekly until delivery     La Nena Hightower MD    Next 5 appointments (look out 90 days)      Jul 10, 2024 2:00 PM  (Arrive by 1:50 PM)  Return OB Visit with La Nena Hightower MD  [facility] ([facility] ) [address] [place] [id number]  [phone]     2024 12:30 PM  (Arrive by 12:20 PM)  Return OB Visit with La Nena Hightower MD  [facility] ([facility] ) [address] [place] [id number]  [phone]     2024 12:30 PM  (Arrive by 12:10 PM)  Well Child Check with La Nena Hightower MD  [facility]  [facility] [address] [facility] [address] [phone]

## 2024-07-04 LAB — GP B STREP DNA SPEC QL NAA+PROBE: POSITIVE

## 2024-07-10 ENCOUNTER — PRENATAL OFFICE VISIT (OUTPATIENT)
Dept: FAMILY MEDICINE | Facility: CLINIC | Age: 24
End: 2024-07-10
Payer: COMMERCIAL

## 2024-07-10 VITALS
BODY MASS INDEX: 25.77 KG/M2 | SYSTOLIC BLOOD PRESSURE: 106 MMHG | HEIGHT: 69 IN | OXYGEN SATURATION: 99 % | TEMPERATURE: 97.6 F | RESPIRATION RATE: 12 BRPM | WEIGHT: 174 LBS | HEART RATE: 89 BPM | DIASTOLIC BLOOD PRESSURE: 71 MMHG

## 2024-07-10 DIAGNOSIS — R76.8 ANTI-TPO ANTIBODIES PRESENT: ICD-10-CM

## 2024-07-10 DIAGNOSIS — F41.1 GAD (GENERALIZED ANXIETY DISORDER): ICD-10-CM

## 2024-07-10 DIAGNOSIS — O24.410 DIET CONTROLLED GESTATIONAL DIABETES MELLITUS (GDM) IN THIRD TRIMESTER: ICD-10-CM

## 2024-07-10 DIAGNOSIS — O09.93 SUPERVISION OF HIGH RISK PREGNANCY IN THIRD TRIMESTER: Primary | ICD-10-CM

## 2024-07-10 DIAGNOSIS — F41.0 PANIC ATTACK: ICD-10-CM

## 2024-07-10 DIAGNOSIS — O21.0 HYPEREMESIS GRAVIDARUM: ICD-10-CM

## 2024-07-10 DIAGNOSIS — E06.3 HASHIMOTO'S THYROIDITIS: ICD-10-CM

## 2024-07-10 PROCEDURE — 99214 OFFICE O/P EST MOD 30 MIN: CPT | Mod: 25 | Performed by: FAMILY MEDICINE

## 2024-07-10 PROCEDURE — 99207 PR COMPLICATED OB VISIT: CPT | Performed by: FAMILY MEDICINE

## 2024-07-10 NOTE — PROGRESS NOTES
CHRISTINE  38w3d    Estimated Date of Delivery: Jul 21, 2024      S:  Reviewed option for IOL @ 39 weeks for her medical comorbidities; declines for now.     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     Reviewed recent GBS swab was +; she opts to continue with abx during labor. See prior notes on that discussion.     Yesterday a lot of stanley diane but none today.   Yesterday was more anxious and constipated and dehydrated   Today feels great and no stanley diane; feeling very settled  Has noticed increased stanley diane on day s she is anxious (or visa versa)    Gestational prediabetes: has been going well; using CGM monitor; Fasting BS 90-94, post prandials all less than 120s.     Overall states her anxiety is much less since transition of care.   Tried the hydroxyzine twice last week 1/2 tab which was helpful    Severe hyperemesis until the last week or so.   Nausea has been improved  Reflux has been really bad- tums only a couple per day; most helpful. Pepcid not helpful; omeprazole so so; doesn't want further change on this     Hx of fast labor with her first baby- 24hr labor overall ; 7lb 12oz at birth  39 week visit was 2cm; back pain/early labors  She labored so mildly it seemed that she didn't realize she actually got to complete at  home  eventually and felt vertex/sac or head (so she was complete but didn't realize it at the time).  Delivered within 10min of hospital arrival- in just 2 ctx which were 6 min apart.  She didn't realize at the time she was bearing down/pushing and eating dinner between      + fetal movement.   No vaginal bleeding, cramping, LOF.   No headaches, vision change, RUQ abdominal pain, LE swelling.       O: Vitals reviewed, see prenatal flowsheet for  measurements.   appears well, no acute distress.  Normal respiratory effort.  Abdomen is soft. No LE swelling.   Cephalic by bedside ultrasound- large head noted  Cervical exam declined by pt    A/P:     Supervision of high risk pregnancy (for hx of low lying placenta, maternal anxiety, hyperemesis, subclinical hypothyroidism, gestational prediabetes, LGA)    Coping ok at this time.     LGA baby on last 36wk scan 91%ile (7lb5oz); total weight gain 19lbs (hyperemesis)    Feeding plans/breast pump: breast  [place] Provider chosen: Dr. Hightower  [facility] BCM/family planning: [place] method, used in the past as well.  Birth plan/preferences:  Hoping for un medicated birth, hydrotherapy, Magallanes hour. However she is open to other forms of pain management and we reviewed possible operative delivery routes with LGA baby.   Car seat: yes  Circumcision if boy: Likely no.      - RSV vaccine declined for now; prefers mAB RSV for baby after birth  - GBS + in urine earlier in pregnancy; pt requested repeat GBS swab (see prior notes)>>> PCR was + @ 37 weeks, Pt opts for abx during labor and will manage her SIBO/gi effects if needed after  - O positive, didn't need rhogam this pregnancy     Low-lying placenta--> resolved now @ 36wk scan  3.1cm from os (>2cm goal)  Anticipate         Impaired glucose in pregnancy, antepartum  Diet controlled gestational PREdiabetes mellitus (GDM) in third trimester  A1c 5.9% in 2nd trimester and using CGM to collect BS readings for fasting/post prandials.   Was unable to do GTT due to nausea/hyperemesis.   - She is open to treating this as GDM diet controlled   - declines 39wk IOL at this time (for LGA infant/anxiety); reviewed offered at 40wks for GDM diet controlled and recommended at 41 weeks.   - For now declines need for dietary visit and watching sugars closely with CGM. Understands the CGM is a valid way to check sugars however not currently used as a standard in pregnancy management.                ?Fasting blood glucose concentration: <95 mg/dL (5.3 mmol/L)              ?One-hour postprandial blood glucose concentration: <140 mg/dL (7.8 mmol/L)              ?Two-hour postprandial glucose concentration: <120 mg/dL (6.7 mmol/L)  -Discussed that patient will need a 2 hour oral glucose tolerance test between 6 and 12 weeks postpartum  (or repeat A1c by her preference/CGM due to concern for vomiting)        Hashimoto's thyroiditis  Anti-TPO antibodies present  Has previously been seen by endocrinology though not currently. TSH has been stable on current synthoid dose.   - TSH @ 37 wks normal     Hyperemesis gravidarum  Improved this week with better appetite; on zofran about once daily; has needed IV fluids   - optimize anxiety, trial increased tums for the reflux; Reglan again if desired    CHRISTINA (generalized anxiety disorder)  Stable at this time; suboptimal overall this pregnancy  - has prn hydroxyzine for panic  - declines selective serotonin reuptake inhibitor for now  - has a great therapist she continues with     Follow up: weekly until delivery     La Nena Hightower MD    Next 5 appointments (look out 90 days)      Jul 10, 2024 2:00 PM  (Arrive by 1:50 PM)  Return OB Visit with La Nena Hightower MD  [facility] ([facility] ) [address] [place] [phone]  [phone]     Jul 18, 2024 12:30 PM  (Arrive by 12:20 PM)  Return OB Visit with La Nena Hightower MD  [facility] ([facility] ) [address] [place] [phone]  [phone]     Jul 31, 2024 12:30 PM  (Arrive by 12:10 PM)  Well Child Check with La Nena Hightower MD  [facility] ([facility] ) [address] [place] [phone]  [phone]

## 2024-07-18 ENCOUNTER — PRENATAL OFFICE VISIT (OUTPATIENT)
Dept: FAMILY MEDICINE | Facility: CLINIC | Age: 24
End: 2024-07-18
Payer: COMMERCIAL

## 2024-07-18 VITALS
RESPIRATION RATE: 20 BRPM | WEIGHT: 175.13 LBS | SYSTOLIC BLOOD PRESSURE: 108 MMHG | OXYGEN SATURATION: 96 % | DIASTOLIC BLOOD PRESSURE: 74 MMHG | BODY MASS INDEX: 26.24 KG/M2 | HEART RATE: 101 BPM

## 2024-07-18 DIAGNOSIS — O09.93 SUPERVISION OF HIGH RISK PREGNANCY IN THIRD TRIMESTER: Primary | ICD-10-CM

## 2024-07-18 DIAGNOSIS — O24.410 DIET CONTROLLED GESTATIONAL DIABETES MELLITUS (GDM) IN THIRD TRIMESTER: ICD-10-CM

## 2024-07-18 PROCEDURE — 99207 PR COMPLICATED OB VISIT: CPT | Performed by: FAMILY MEDICINE

## 2024-07-18 NOTE — PATIENT INSTRUCTIONS
You can call the radiology department at [phone] to schedule your imaging test that was ordered.

## 2024-07-18 NOTE — PROGRESS NOTES
CHRISTINE  39w4d    Estimated Date of Delivery: Jul 21, 2024      S:  Here with Chuy and daughter Mally.   She is feeling really good now from mindset standpoint; therapy very helpful.   Zofran this morning; hydroxyzine a few times a week for the anxiousness before bed  Having stronger stanley diane through the day, nothing regular.   Baby not yet engaged in the pelvis, doesn't want a cervical exam today    GDM: diet controlled, using CGM monitor; Blood sugars: fasting 74-80s, only one abnormal fasting of 97 once this entire time. Post prandials less than 120.    Reviewed option for IOL @ 39 weeks for her medical comorbidities; declines for now.     EFW @ 36 weeks 91%ile, measuring large for dates; prefers to avoid IOL  Estimated Fetal Weight:  3320 g (7.5lbs)  EFW Percentile: 91%  Mally was 7to72hn    Birth hx:  Hx of fast labor with her first baby- 24hr labor overall ; 7lb 12oz at birth  39 week visit was 2cm; back pain/early labors  She labored so mildly it seemed that she didn't realize she actually got to complete at  home  eventually and felt vertex/sac or head (so she was complete but didn't realize it at the time).  Delivered within 10min of hospital arrival- in just 2 ctx which were 6 min apart.  She didn't realize at the time she was bearing down/pushing and eating dinner between      + fetal movement.   No vaginal bleeding, cramping, LOF.   No headaches, vision change, RUQ abdominal pain, LE swelling.       O: Vitals reviewed, see prenatal flowsheet for measurements.   appears well, no acute distress.  Normal respiratory effort.  Abdomen is soft. No LE swelling.   Cephalic by bedside ultrasound- large head noted, not engaged in pelvis but centered  Cervical exam declined by pt    A/P:     Supervision of high risk pregnancy (for hx of low lying placenta, maternal anxiety, hyperemesis, subclinical hypothyroidism, gestational prediabetes, LGA)    Coping ok at this time.     LGA baby on last 36wk scan 91%ile  (7lb5oz); total weight gain 19lbs (hyperemesis)    Declines updating EFW at this time (reviewed benefits/risks associated with ultrasound imaging with emphasis on GDM and factors for large baby/shoulder dystocia and operative delivery.     Feeding plans/breast pump: breast  [facility]olt Provider chosen: Dr. Campbell ALVES BCM/family planning: Simón method, used in the past as well.  Birth plan/preferences:  Hoping for un medicated birth, hydrotherapy, Magallanes hour. However she is open to other forms of pain management and we reviewed possible operative delivery routes with LGA baby.   Car seat: yes  Circumcision if boy: Likely no.      - RSV vaccine declined for now; prefers mAB RSV for baby after birth  - GBS + in urine earlier in pregnancy; pt requested repeat GBS swab (see prior notes)>>> PCR was + @ 37 weeks, Pt opts for abx during labor and will manage her SIBO/gi effects if needed after  - O positive, didn't need rhogam this pregnancy    - Reviewed ACOG recommendations for induction of labor at 40 and 0/7 gestation to decrease the risk of  morbidity and mortality due to GDM diet controlled (specifically stillbirth, , and infant death) and to reduce the risk of  delivery. These risks are reduced compared to expectant management. Reviewed that IOL may include cervical ripening if cervix is unfavorable, AROM, pitocin, fetal monitoring and other interventions to ensure wellbeing. There may be a chance for failed induction or c/s delivery. After this conversation, pt has declined scheduling IOL and opts for expectant management. BPP ordered and pt agreeable to doing early next week.       Low-lying placenta--> resolved now @ 36wk scan  3.1cm from os (>2cm goal)  Anticipate         Impaired glucose in pregnancy, antepartum  Diet controlled gestational PREdiabetes mellitus (GDM) in third trimester  A1c 5.9% in 2nd trimester and using CGM to collect BS readings for fasting/post prandials.    Was unable to do GTT due to nausea/hyperemesis.   - She is open to treating this as GDM diet controlled   - declines 39wk IOL at this time (for LGA infant/anxiety); recommended IOL at 40wks for GDM diet controlled or at latest by 41 weeks.   - For now declines need for dietary visit and watching sugars closely with CGM. Understands the CGM is a valid way to check sugars however not currently used as a standard in pregnancy management.               ?Fasting blood glucose concentration: <95 mg/dL (5.3 mmol/L)              ?One-hour postprandial blood glucose concentration: <140 mg/dL (7.8 mmol/L)              ?Two-hour postprandial glucose concentration: <120 mg/dL (6.7 mmol/L)  -Discussed that patient will need a 2 hour oral glucose tolerance test between 6 and 12 weeks postpartum  (or repeat A1c by her preference/CGM due to concern for vomiting)        Hashimoto's thyroiditis  Anti-TPO antibodies present  Has previously been seen by endocrinology though not currently. TSH has been stable on current synthoid dose.   - TSH @ 37 wks normal     Hyperemesis gravidarum  Improved this week with better appetite; on zofran about once daily; has needed IV fluids   - optimize anxiety, trial increased tums for the reflux; Reglan again if desired    CHRISTINA (generalized anxiety disorder)  Stable at this time; suboptimal overall this pregnancy  - has prn hydroxyzine for panic  - declines selective serotonin reuptake inhibitor for now  - has a great therapist she continues with     Follow up: weekly until delivery     La Nena Hightower MD    Next 5 appointments (look out 90 days)      Jul 25, 2024 12:30 PM  (Arrive by 12:20 PM)  Return OB Visit with La Nena Hightower MD  [facility] ([facility] ) [address] [id number] [phone]     Jul 31, 2024 12:30 PM  (Arrive by 12:20 PM)  Return OB Visit with La Nena Hightower MD  [facility]  [facility] ([facility] ) [address] [address] [phone]

## 2024-07-20 ENCOUNTER — NURSE TRIAGE (OUTPATIENT)
Dept: NURSING | Facility: CLINIC | Age: 24
End: 2024-07-20
Payer: COMMERCIAL

## 2024-07-20 ENCOUNTER — HOSPITAL ENCOUNTER (INPATIENT)
Facility: CLINIC | Age: 24
LOS: 2 days | Discharge: HOME | End: 2024-07-22
Attending: FAMILY MEDICINE | Admitting: FAMILY MEDICINE
Payer: COMMERCIAL

## 2024-07-20 PROBLEM — Z34.90 PREGNANCY: Status: ACTIVE | Noted: 2024-07-20

## 2024-07-20 LAB
ABO/RH(D): NORMAL
ANTIBODY SCREEN: NEGATIVE
HGB BLD-MCNC: 12.3 G/DL (ref 11.7–15.7)
SPECIMEN EXPIRATION DATE: NORMAL
T PALLIDUM AB SER QL: NONREACTIVE

## 2024-07-20 PROCEDURE — 85018 HEMOGLOBIN: CPT | Performed by: FAMILY MEDICINE

## 2024-07-20 PROCEDURE — 36415 COLL VENOUS BLD VENIPUNCTURE: CPT | Performed by: FAMILY MEDICINE

## 2024-07-20 PROCEDURE — 250N000011 HC RX IP 250 OP 636: Performed by: FAMILY MEDICINE

## 2024-07-20 PROCEDURE — 86900 BLOOD TYPING SEROLOGIC ABO: CPT | Performed by: FAMILY MEDICINE

## 2024-07-20 PROCEDURE — 250N000009 HC RX 250: Performed by: FAMILY MEDICINE

## 2024-07-20 PROCEDURE — 120N000001 HC R&B MED SURG/OB

## 2024-07-20 PROCEDURE — 86780 TREPONEMA PALLIDUM: CPT | Performed by: FAMILY MEDICINE

## 2024-07-20 PROCEDURE — 258N000003 HC RX IP 258 OP 636: Performed by: FAMILY MEDICINE

## 2024-07-20 RX ORDER — ONDANSETRON 4 MG/1
4 TABLET, ORALLY DISINTEGRATING ORAL EVERY 6 HOURS PRN
Status: DISCONTINUED | OUTPATIENT
Start: 2024-07-20 | End: 2024-07-21 | Stop reason: HOSPADM

## 2024-07-20 RX ORDER — OXYTOCIN 10 [USP'U]/ML
10 INJECTION, SOLUTION INTRAMUSCULAR; INTRAVENOUS
Status: DISCONTINUED | OUTPATIENT
Start: 2024-07-20 | End: 2024-07-22 | Stop reason: HOSPADM

## 2024-07-20 RX ORDER — OXYTOCIN 10 [USP'U]/ML
10 INJECTION, SOLUTION INTRAMUSCULAR; INTRAVENOUS
Status: DISCONTINUED | OUTPATIENT
Start: 2024-07-20 | End: 2024-07-21 | Stop reason: HOSPADM

## 2024-07-20 RX ORDER — CARBOPROST TROMETHAMINE 250 UG/ML
250 INJECTION, SOLUTION INTRAMUSCULAR
Status: DISCONTINUED | OUTPATIENT
Start: 2024-07-20 | End: 2024-07-21 | Stop reason: HOSPADM

## 2024-07-20 RX ORDER — NALOXONE HYDROCHLORIDE 0.4 MG/ML
0.2 INJECTION, SOLUTION INTRAMUSCULAR; INTRAVENOUS; SUBCUTANEOUS
Status: DISCONTINUED | OUTPATIENT
Start: 2024-07-20 | End: 2024-07-20

## 2024-07-20 RX ORDER — LOPERAMIDE HCL 2 MG
4 CAPSULE ORAL
Status: DISCONTINUED | OUTPATIENT
Start: 2024-07-20 | End: 2024-07-21 | Stop reason: HOSPADM

## 2024-07-20 RX ORDER — NALOXONE HYDROCHLORIDE 0.4 MG/ML
0.2 INJECTION, SOLUTION INTRAMUSCULAR; INTRAVENOUS; SUBCUTANEOUS
Status: DISCONTINUED | OUTPATIENT
Start: 2024-07-20 | End: 2024-07-21 | Stop reason: HOSPADM

## 2024-07-20 RX ORDER — OXYTOCIN/0.9 % SODIUM CHLORIDE 30/500 ML
340 PLASTIC BAG, INJECTION (ML) INTRAVENOUS CONTINUOUS PRN
Status: DISCONTINUED | OUTPATIENT
Start: 2024-07-20 | End: 2024-07-21 | Stop reason: HOSPADM

## 2024-07-20 RX ORDER — OXYTOCIN/0.9 % SODIUM CHLORIDE 30/500 ML
100-340 PLASTIC BAG, INJECTION (ML) INTRAVENOUS CONTINUOUS PRN
Status: DISCONTINUED | OUTPATIENT
Start: 2024-07-20 | End: 2024-07-22 | Stop reason: HOSPADM

## 2024-07-20 RX ORDER — PROCHLORPERAZINE 25 MG
25 SUPPOSITORY, RECTAL RECTAL EVERY 12 HOURS PRN
Status: DISCONTINUED | OUTPATIENT
Start: 2024-07-20 | End: 2024-07-21 | Stop reason: HOSPADM

## 2024-07-20 RX ORDER — ONDANSETRON 2 MG/ML
4 INJECTION INTRAMUSCULAR; INTRAVENOUS EVERY 6 HOURS PRN
Status: DISCONTINUED | OUTPATIENT
Start: 2024-07-20 | End: 2024-07-21 | Stop reason: HOSPADM

## 2024-07-20 RX ORDER — IBUPROFEN 800 MG/1
800 TABLET, FILM COATED ORAL
Status: COMPLETED | OUTPATIENT
Start: 2024-07-20 | End: 2024-07-21

## 2024-07-20 RX ORDER — KETOROLAC TROMETHAMINE 30 MG/ML
30 INJECTION, SOLUTION INTRAMUSCULAR; INTRAVENOUS
Status: COMPLETED | OUTPATIENT
Start: 2024-07-20 | End: 2024-07-21

## 2024-07-20 RX ORDER — MISOPROSTOL 200 UG/1
800 TABLET ORAL
Status: DISCONTINUED | OUTPATIENT
Start: 2024-07-20 | End: 2024-07-21 | Stop reason: HOSPADM

## 2024-07-20 RX ORDER — OXYTOCIN/0.9 % SODIUM CHLORIDE 30/500 ML
1-24 PLASTIC BAG, INJECTION (ML) INTRAVENOUS CONTINUOUS
Status: DISCONTINUED | OUTPATIENT
Start: 2024-07-20 | End: 2024-07-21 | Stop reason: HOSPADM

## 2024-07-20 RX ORDER — SODIUM CHLORIDE, SODIUM LACTATE, POTASSIUM CHLORIDE, CALCIUM CHLORIDE 600; 310; 30; 20 MG/100ML; MG/100ML; MG/100ML; MG/100ML
INJECTION, SOLUTION INTRAVENOUS CONTINUOUS PRN
Status: DISCONTINUED | OUTPATIENT
Start: 2024-07-20 | End: 2024-07-21 | Stop reason: HOSPADM

## 2024-07-20 RX ORDER — PROCHLORPERAZINE MALEATE 10 MG
10 TABLET ORAL EVERY 6 HOURS PRN
Status: DISCONTINUED | OUTPATIENT
Start: 2024-07-20 | End: 2024-07-21 | Stop reason: HOSPADM

## 2024-07-20 RX ORDER — NALOXONE HYDROCHLORIDE 0.4 MG/ML
0.4 INJECTION, SOLUTION INTRAMUSCULAR; INTRAVENOUS; SUBCUTANEOUS
Status: DISCONTINUED | OUTPATIENT
Start: 2024-07-20 | End: 2024-07-20

## 2024-07-20 RX ORDER — TRANEXAMIC ACID 10 MG/ML
1 INJECTION, SOLUTION INTRAVENOUS EVERY 30 MIN PRN
Status: DISCONTINUED | OUTPATIENT
Start: 2024-07-20 | End: 2024-07-21 | Stop reason: HOSPADM

## 2024-07-20 RX ORDER — LOPERAMIDE HCL 2 MG
2 CAPSULE ORAL
Status: DISCONTINUED | OUTPATIENT
Start: 2024-07-20 | End: 2024-07-21 | Stop reason: HOSPADM

## 2024-07-20 RX ORDER — MISOPROSTOL 200 UG/1
400 TABLET ORAL
Status: DISCONTINUED | OUTPATIENT
Start: 2024-07-20 | End: 2024-07-21 | Stop reason: HOSPADM

## 2024-07-20 RX ORDER — LIDOCAINE 40 MG/G
CREAM TOPICAL
Status: DISCONTINUED | OUTPATIENT
Start: 2024-07-20 | End: 2024-07-21 | Stop reason: HOSPADM

## 2024-07-20 RX ORDER — CITRIC ACID/SODIUM CITRATE 334-500MG
30 SOLUTION, ORAL ORAL
Status: DISCONTINUED | OUTPATIENT
Start: 2024-07-20 | End: 2024-07-21 | Stop reason: HOSPADM

## 2024-07-20 RX ORDER — METHYLERGONOVINE MALEATE 0.2 MG/ML
200 INJECTION INTRAVENOUS
Status: DISCONTINUED | OUTPATIENT
Start: 2024-07-20 | End: 2024-07-21 | Stop reason: HOSPADM

## 2024-07-20 RX ORDER — PENICILLIN G 3000000 [IU]/50ML
3 INJECTION, SOLUTION INTRAVENOUS EVERY 4 HOURS
Status: DISCONTINUED | OUTPATIENT
Start: 2024-07-20 | End: 2024-07-21 | Stop reason: HOSPADM

## 2024-07-20 RX ORDER — PENICILLIN G POTASSIUM 5000000 [IU]/1
5 INJECTION, POWDER, FOR SOLUTION INTRAMUSCULAR; INTRAVENOUS ONCE
Status: COMPLETED | OUTPATIENT
Start: 2024-07-20 | End: 2024-07-20

## 2024-07-20 RX ORDER — FENTANYL CITRATE 50 UG/ML
50 INJECTION, SOLUTION INTRAMUSCULAR; INTRAVENOUS EVERY 30 MIN PRN
Status: DISCONTINUED | OUTPATIENT
Start: 2024-07-20 | End: 2024-07-21 | Stop reason: HOSPADM

## 2024-07-20 RX ORDER — METOCLOPRAMIDE HYDROCHLORIDE 5 MG/ML
10 INJECTION INTRAMUSCULAR; INTRAVENOUS EVERY 6 HOURS PRN
Status: DISCONTINUED | OUTPATIENT
Start: 2024-07-20 | End: 2024-07-21 | Stop reason: HOSPADM

## 2024-07-20 RX ORDER — NALOXONE HYDROCHLORIDE 0.4 MG/ML
0.4 INJECTION, SOLUTION INTRAMUSCULAR; INTRAVENOUS; SUBCUTANEOUS
Status: DISCONTINUED | OUTPATIENT
Start: 2024-07-20 | End: 2024-07-21 | Stop reason: HOSPADM

## 2024-07-20 RX ORDER — METOCLOPRAMIDE 10 MG/1
10 TABLET ORAL EVERY 6 HOURS PRN
Status: DISCONTINUED | OUTPATIENT
Start: 2024-07-20 | End: 2024-07-21 | Stop reason: HOSPADM

## 2024-07-20 RX ADMIN — SODIUM CHLORIDE, POTASSIUM CHLORIDE, SODIUM LACTATE AND CALCIUM CHLORIDE 1000 ML: 600; 310; 30; 20 INJECTION, SOLUTION INTRAVENOUS at 04:49

## 2024-07-20 RX ADMIN — PENICILLIN G 3 MILLION UNITS: 3000000 INJECTION, SOLUTION INTRAVENOUS at 16:54

## 2024-07-20 RX ADMIN — PENICILLIN G POTASSIUM 5 MILLION UNITS: 5000000 POWDER, FOR SOLUTION INTRAMUSCULAR; INTRAPLEURAL; INTRATHECAL; INTRAVENOUS at 04:52

## 2024-07-20 RX ADMIN — Medication 1 MILLI-UNITS/MIN: at 19:21

## 2024-07-20 RX ADMIN — PENICILLIN G 3 MILLION UNITS: 3000000 INJECTION, SOLUTION INTRAVENOUS at 21:07

## 2024-07-20 RX ADMIN — PENICILLIN G 3 MILLION UNITS: 3000000 INJECTION, SOLUTION INTRAVENOUS at 09:07

## 2024-07-20 ASSESSMENT — ACTIVITIES OF DAILY LIVING (ADL)
ADLS_ACUITY_SCORE: 18

## 2024-07-20 NOTE — PROGRESS NOTES
Litzy has been laboring throughout the day today.  Contractions every 5 to 10 minutes throughout the morning and then spaced out a bit this afternoon but have now picked up a bit again.    She did get 2 doses of antibiotics (group B strep positive with SROM) but declined her 1 PM penicillin dose.  She has concerns of GI upset due to history of bacterial overgrowth.  She has not had issues with GI upset with her first use 2 doses of penicillin however.    She had a cervical check at around 11 AM and she was 2.5/70/-1/post -she then was able to pump which caused a few strong contractions.  She is been walking.  Cervical check at 4 PM was essentially unchanged other than cervix is more anterior.    She does feel as though things may be slowly picking up.    She is hopeful to avoid interventions but is willing to use Pitocin for augmentation if needed.    We discussed the risk of group B strep and the importance of antibiotic therapy throughout labor.  Discussed that penicillin's half-life is only about 30 to 60 minutes thus we need to be dosing every 4 hours.  She is amenable to restarting the antibiotics now.    She would like to see if her body continues to have stronger contractions over the next few hours and would like a recheck at around 6:30 PM.  If she has not made appreciable cervical change at that time she would be amenable to starting low-dose Pitocin (1X1).    Excellent discussion regarding risk and benefits of antibiotics for group B strep as well as risk and benefits of the Pitocin.    She does have a slight concern regarding a retained placenta after her last delivery (required D&C).  We discussed an active third stage of labor with gentle cord traction.  She is okay with this.  We discussed that sometimes the placenta can take 20 to 30 minutes to expulse spontaneously.  We would recommend further intervention thereafter.    Patient has a continuous glucose monitor.  Blood sugars have been in the 80s  all day and labor.  No further intervention needed regarding this at this time.  Ultrasound done approximately 4 weeks ago showed baby was in the 91st percentile weighing 7 pounds 3 ounces.  First baby at birth weight 7 pounds 12 ounces.  We will plan to do hypoglycemic protocol for baby after delivery.    I personally spent over 75 minutes performing care related to this patient on the day of the patient admission.  This includes chart review, talking with the nurse, precharting, talking with/ examining the patient as well as completing after visit documentation.

## 2024-07-20 NOTE — PROVIDER NOTIFICATION
Dr. De La Vega updated on the following;    Patient placed on external monitor due to auscultated higher heart rate, FHR tracing CAT I on the monitor. Xander every 9-10 minutes.  Patient opts to wait a bit longer to reassess cervix.  She would like to walk and try pumping prior to next exam. Patient also declined 1300 dose of PCN due to GI concerns.

## 2024-07-20 NOTE — TELEPHONE ENCOUNTER
"OB Triage Call    Is patient's OB/Midwife with the [facility] or [facility]? [facility]- Is patient's primary OB a Midwife? No- Proceed with triage.     Reason for call: rupture of membranes just prior to this call    Assessment:   Denies;  Contractions at time of this call  Urge to push  Vaginal bleeding    Plan: Go to L&D now    Patient plans to deliver at [facility]    Patient's primary OB Provider is Dr. Hightower.    Per protocol recommendations Patient to be evaluated in L&D.  Patient plans to deliver at [facility]ivering [facility]: [facility] ([phone]).  [facility] and Deliver notified of patient's pending arrival.  Report given to Alicia at L&D.      Is patient's delivering hospital on divert? No      39w6d    Estimated Date of Delivery: 2024        OB History    Para Term  AB Living   2 1 1 0 0 1   SAB IAB Ectopic Multiple Live Births   0 0 0 0 1      # Outcome Date GA Lbr Mulugeta/2nd Weight Sex Type Anes PTL Lv   2 Current            1 Term 22 39w0d 01:00 / 00:11 3.51 kg (7 lb 11.8 oz) F Vag-Spont None N RYLIE      Birth Comments: Rapid labor, delivered shortly after arrival to hospital, retained placenta with D&C approx 1 month after delivery      Name: Mally      Apgar1: 8  Apgar5: 9       No results found for: \"GBS\"       Corrie Pavon RN   Reason for Disposition   Leakage of fluid from vagina    Additional Information   Negative: Severe bleeding (e.g., continuous red blood from vagina, or large blood clots)   Negative: [1] SEVERE abdominal pain (e.g., excruciating) AND [2] constant AND [3] present > 1 hour   Negative: Umbilical cord hanging out of the vagina (shiny, white, curled appearance, \"like telephone cord\")   Negative: Uncontrollable urge to push (i.e., feels like baby is coming out now)   Negative: Can see baby   Negative: Sounds like a life-threatening emergency to the triager   Negative: < 20 weeks pregnant   Negative: Vaginal bleeding    Protocols used: Pregnancy - Rupture " of [id number]

## 2024-07-20 NOTE — PROVIDER NOTIFICATION
Dr. De La Vega called for update on patient status;    -still fabricio every 5-6, somewhat irregular.  -no concerns with FHR dopplers  -plan is to offer cervical check again to get a starting point and possibly ultrasound to verify positioning if patient declines check.
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
## 2024-07-20 NOTE — PLAN OF CARE
Goal Outcome Evaluation:       Problem: Labor  Goal: Effective Progression to Delivery  Outcome: Progressing       Pt arrived to unit on this shift. SROM at 0200 for clear fluid with contractions that are manageable. Rates 3/10 pain and is breathing well through contractions. Plan to start IV, run antibiotics d/t GBS+ and let rest and monitor labor progression.

## 2024-07-20 NOTE — PROGRESS NOTES
Pt ambulated to 264 from triage. Pt here for ruptured membranes and contractions. Spouse, Chuy, with patient. Pt oriented to room. Plan discussed with patient. Agreeable to starting IV, running fluids with PCN and intermittent auscultating with doppler. Pt wanting to rest after starting IV.    Will continue to monitor. Kate Melendez RN

## 2024-07-20 NOTE — PROGRESS NOTES
Dr. De La Vega called for update.     Reported to Dr. De La Vega as followed: Pt reported having 6 ctx's in 45 mins. Pt stated that they are manageable, able to breathe through them but slowly increasing in intensity. Pt denying cervical exam at this time. IV antibiotics administered and IV saline locked. Auscultation rate is 130s with audible increases and no audible decreases. Pt reports fetal movement.    Dr. De La Vega would like update if pt status changes, otherwise will be rounding on the patient later this morning. No new orders at this time.    Pt requesting no doppler or temperature check at 0700 to allow for rest. Discussed the risks and pt verbalized understanding and continued to request no doppler or temperature check at 0700.    Will continue to monitor. Kate Melendez RN

## 2024-07-20 NOTE — PROVIDER NOTIFICATION
PROVIDER NOTIFICATION:    Dr. De La Vega updated on SVE of 3.5/75/-1    Patient states agreeable to starting low dose pitocin.

## 2024-07-20 NOTE — PROVIDER NOTIFICATION
Dr. De La Vega updated on patient status;    -Continuing intermittent dopplers. No concerns at this time.  -describes contractions as low intermittent cramping. States feeling them every 5 minutes.

## 2024-07-20 NOTE — PROVIDER NOTIFICATION
Dr. De La Vega updated on patient's status;    -SVE-2.5/75/-1  -patient reports irregular and sporadic contractions   -no concerns with FHR dopplers   no forebag noted  -head down based on SVE    Dr. Muñoz states ok to recheck patient in 2 hours and reassess plan of care at that time

## 2024-07-21 PROCEDURE — 59400 OBSTETRICAL CARE: CPT | Performed by: FAMILY MEDICINE

## 2024-07-21 PROCEDURE — 120N000001 HC R&B MED SURG/OB

## 2024-07-21 PROCEDURE — 250N000013 HC RX MED GY IP 250 OP 250 PS 637: Performed by: FAMILY MEDICINE

## 2024-07-21 PROCEDURE — 722N000001 HC LABOR CARE VAGINAL DELIVERY SINGLE

## 2024-07-21 PROCEDURE — 250N000011 HC RX IP 250 OP 636: Performed by: FAMILY MEDICINE

## 2024-07-21 PROCEDURE — 250N000009 HC RX 250: Performed by: FAMILY MEDICINE

## 2024-07-21 RX ORDER — METHYLERGONOVINE MALEATE 0.2 MG/ML
200 INJECTION INTRAVENOUS
Status: DISCONTINUED | OUTPATIENT
Start: 2024-07-21 | End: 2024-07-22 | Stop reason: HOSPADM

## 2024-07-21 RX ORDER — OXYTOCIN/0.9 % SODIUM CHLORIDE 30/500 ML
340 PLASTIC BAG, INJECTION (ML) INTRAVENOUS CONTINUOUS PRN
Status: DISCONTINUED | OUTPATIENT
Start: 2024-07-21 | End: 2024-07-22 | Stop reason: HOSPADM

## 2024-07-21 RX ORDER — BISACODYL 10 MG
10 SUPPOSITORY, RECTAL RECTAL DAILY PRN
Status: DISCONTINUED | OUTPATIENT
Start: 2024-07-21 | End: 2024-07-22 | Stop reason: HOSPADM

## 2024-07-21 RX ORDER — ACETAMINOPHEN 325 MG/1
650 TABLET ORAL EVERY 4 HOURS PRN
Status: DISCONTINUED | OUTPATIENT
Start: 2024-07-21 | End: 2024-07-22 | Stop reason: HOSPADM

## 2024-07-21 RX ORDER — IBUPROFEN 800 MG/1
800 TABLET, FILM COATED ORAL EVERY 6 HOURS PRN
Status: DISCONTINUED | OUTPATIENT
Start: 2024-07-21 | End: 2024-07-22 | Stop reason: HOSPADM

## 2024-07-21 RX ORDER — MODIFIED LANOLIN
OINTMENT (GRAM) TOPICAL
Status: DISCONTINUED | OUTPATIENT
Start: 2024-07-21 | End: 2024-07-22 | Stop reason: HOSPADM

## 2024-07-21 RX ORDER — MISOPROSTOL 200 UG/1
800 TABLET ORAL
Status: DISCONTINUED | OUTPATIENT
Start: 2024-07-21 | End: 2024-07-22 | Stop reason: HOSPADM

## 2024-07-21 RX ORDER — LOPERAMIDE HCL 2 MG
4 CAPSULE ORAL
Status: DISCONTINUED | OUTPATIENT
Start: 2024-07-21 | End: 2024-07-22 | Stop reason: HOSPADM

## 2024-07-21 RX ORDER — OXYTOCIN 10 [USP'U]/ML
10 INJECTION, SOLUTION INTRAMUSCULAR; INTRAVENOUS
Status: DISCONTINUED | OUTPATIENT
Start: 2024-07-21 | End: 2024-07-22 | Stop reason: HOSPADM

## 2024-07-21 RX ORDER — CARBOPROST TROMETHAMINE 250 UG/ML
250 INJECTION, SOLUTION INTRAMUSCULAR
Status: DISCONTINUED | OUTPATIENT
Start: 2024-07-21 | End: 2024-07-22 | Stop reason: HOSPADM

## 2024-07-21 RX ORDER — DOCUSATE SODIUM 100 MG/1
100 CAPSULE, LIQUID FILLED ORAL DAILY
Status: DISCONTINUED | OUTPATIENT
Start: 2024-07-21 | End: 2024-07-22 | Stop reason: HOSPADM

## 2024-07-21 RX ORDER — HYDROCORTISONE 25 MG/G
CREAM TOPICAL 3 TIMES DAILY PRN
Status: DISCONTINUED | OUTPATIENT
Start: 2024-07-21 | End: 2024-07-22 | Stop reason: HOSPADM

## 2024-07-21 RX ORDER — MISOPROSTOL 200 UG/1
400 TABLET ORAL
Status: DISCONTINUED | OUTPATIENT
Start: 2024-07-21 | End: 2024-07-22 | Stop reason: HOSPADM

## 2024-07-21 RX ORDER — TRANEXAMIC ACID 10 MG/ML
1 INJECTION, SOLUTION INTRAVENOUS EVERY 30 MIN PRN
Status: DISCONTINUED | OUTPATIENT
Start: 2024-07-21 | End: 2024-07-22 | Stop reason: HOSPADM

## 2024-07-21 RX ORDER — LOPERAMIDE HCL 2 MG
2 CAPSULE ORAL
Status: DISCONTINUED | OUTPATIENT
Start: 2024-07-21 | End: 2024-07-22 | Stop reason: HOSPADM

## 2024-07-21 RX ADMIN — ACETAMINOPHEN 650 MG: 325 TABLET ORAL at 07:26

## 2024-07-21 RX ADMIN — KETOROLAC TROMETHAMINE 30 MG: 30 INJECTION, SOLUTION INTRAMUSCULAR at 02:49

## 2024-07-21 RX ADMIN — PENICILLIN G 3 MILLION UNITS: 3000000 INJECTION, SOLUTION INTRAVENOUS at 01:16

## 2024-07-21 RX ADMIN — ACETAMINOPHEN 650 MG: 325 TABLET ORAL at 15:16

## 2024-07-21 RX ADMIN — IBUPROFEN 800 MG: 800 TABLET, FILM COATED ORAL at 16:39

## 2024-07-21 RX ADMIN — Medication 340 ML/HR: at 02:11

## 2024-07-21 RX ADMIN — IBUPROFEN 800 MG: 800 TABLET, FILM COATED ORAL at 22:46

## 2024-07-21 RX ADMIN — ACETAMINOPHEN 650 MG: 325 TABLET ORAL at 19:18

## 2024-07-21 RX ADMIN — IBUPROFEN 800 MG: 800 TABLET, FILM COATED ORAL at 10:36

## 2024-07-21 ASSESSMENT — ACTIVITIES OF DAILY LIVING (ADL)
ADLS_ACUITY_SCORE: 18

## 2024-07-21 NOTE — PLAN OF CARE
Problem: Adult Inpatient Plan of Care  Goal: Optimal Comfort and Wellbeing  Outcome: Progressing  Intervention: Provide Person-Centered Care  Recent Flowsheet Documentation  Taken 2024 0400 by Meme Pak RN  Trust Relationship/Rapport:   care explained   emotional support provided   choices provided   questions answered   empathic listening provided   questions encouraged   reassurance provided   thoughts/feelings acknowledged     Problem: Postpartum (Vaginal Delivery)  Goal: Effective Urinary Elimination  Outcome: Progressing     Problem: Breastfeeding  Goal: Effective Breastfeeding  Outcome: Progressing   Goal Outcome Evaluation:       Patients vitals WDL. Fundus firm and at midline. Bleeding is light. Pain controlled by Toradol. Ambulates and voids independently. Bonding well with .

## 2024-07-21 NOTE — PLAN OF CARE
Goal Outcome Evaluation:    Problem: Postpartum (Vaginal Delivery)  Goal: Optimal Pain Control and Function  Outcome: Progressing     Problem: Postpartum (Vaginal Delivery)  Goal: Effective Urinary Elimination  Outcome: Progressing       Vitally stable. Fundus firm, minimal bleeding. Utilizing ice packs and tucks pads. Voiding adequately. Bonding well with baby.

## 2024-07-21 NOTE — PLAN OF CARE
Goal Outcome Evaluation:      Plan of Care Reviewed With: patient    Overall Patient Progress: improvingOverall Patient Progress: improving       Patient continuing to naturally labor. Fluid remains clear and patient afebrile.

## 2024-07-21 NOTE — LACTATION NOTE
"This note was copied from a baby's chart.  Lactation visit for mom Litzy and 8 hour old  boy - Brady.  2nd baby for mom.  Had good success with nursing previous baby.  And has already been able to collect a lot of colostrum via hand expression and [employer] pump.  Baby has nursed well on left side in football hold but has not been successful on right side.    Assisted with positioning and breastfeeding on right side; suggested cross cradle hold.  Had mom express colostrum to initiate feeding.  Baby latched and nursed well with minimal assistance.  Latch felt a little \"pinchy\" at start but then became comfortable.  Frequent swallows audible.  Showed mom how to compress breast to keep milk flowing to baby.  Encouraged lots of breast/nipple stimulation and skin to skin to help with milk production with a minimum of 8 feeding attempts in 24 hours.  Discussed use of pacifier.  Began review of breastfeeding section of patient education booklet and pointed out handout with QR codes on hand expression, latch, positions, how to choose a flange size, etc.    Mom as [employer]ra pump at home.  Nipples measure at 17 mm's so could try 19 mm size flanges.    "

## 2024-07-21 NOTE — L&D DELIVERY NOTE
OB Vaginal Delivery Note    Litzy Hatch MRN# [id number]   Age: 24 year old YOB: 2000       GA: 40w0d  GP:   Labor Complications: None   EBL:   mL  Delivery QBL:    Delivery Type: Vaginal, Spontaneous   ROM to Delivery Time: (Delivered) Hours: 23 Minutes: 53  [place] Weight:     1 Minute 5 Minute 10 Minute   Apgar Totals: 8   9             Delivery Details:  Litzy Hatch, a 24 year old  female delivered a viable infant with apgars of 8  and 9 .     Please see previous note for further details, briefly patient presented to labor and delivery after SROM at 2am on 2024.  She is group B strep positive and was started on antibiotics upon admission.  She was allowed to labor with a cervical check at 11 AM at 2.5 cm with a repeat check a few hours later basically unchanged.  She did many position changes and walking and pumping.  Repeat cervical check at 7 PM was around 3.5 cm and she consented to low-dose Pitocin.  1 milliunit of Pitocin was started with good results.  She became very uncomfortable and was checked again at around 10:30 PM and was 6 cm.  I was called in for delivery at that time due to her discomfort.  She was checked again around 1130 and found to be 7 cm.  She then opted to use nitrous for pain control.      She was once again checked at around 115 and found to be complete.  She mostly labored on her hands and knees and deliver this way as well.     Delivery was via vaginal, spontaneous  to a sterile field under nitrous oxide  anesthesia. Infant delivered in vertex  middle  occiput  anterior  position. Anterior and posterior shoulders delivered without difficulty. The cord was clamped, cut twice and 3 vessels  were noted. Cord blood was obtained in routine fashion with the following disposition: lab .      Cord complications: none   Placenta delivered at 2024  2:10 AM . Placental disposition was Hospital disposal . Fundal massage performed and fundus  found to be firm.     Episiotomy: none    Perineum, vagina, cervix were inspected, and the following lacerations were noted:   Perineal lacerations: none       Excellent hemostasis was noted. Needle count correct. Infant and patient in delivery room in good and stable condition.        Polly Hatch [[id number]]      Rupture date/time: 7/20/24 0200   Rupture type: Spontaneous Rupture of Membranes  Fluid color: Clear  Fluid odor: Normal     Augmentation: Oxytocin  Indications for augmentation: Ineffective Contraction Pattern       Delivery/Placenta Date and Time      Delivery Date: 7/21/24 Delivery Time:  1:53 AM   Placenta Date/Time: 7/21/2024  2:10 AM  Oxytocin given at the time of delivery: after delivery of placenta  Delivering clinician: Deepa De La Vega MD              Apgars    Living status: Living   1 Minute 5 Minute 10 Minute 15 Minute 20 Minute   Skin color: 1  1       Heart rate: 2  2       Reflex irritability: 2  2       Muscle tone: 1  2       Respiratory effort: 2  2       Total: 8  9       Apgars assigned by: MARGARITA LEE       Cord      Vessels: 3 Vessels    Cord Complications: None               Cord Blood Disposition: Lab    Gases Sent?: No    Delayed cord clamping?: Yes    Cord Clamping Delay (seconds): >120 seconds           Labor Events and Shoulder Dystocia    Fetal Tracing Prior to Delivery: Category 1  Shoulder dystocia present?: Neg       Delivery (Maternal) (Provider to Complete) ([id number])    Episiotomy: None  Perineal lacerations: None    Repair suture: None  Genital tract inspection done: Pos       Blood Loss  Mother: Litzy Hatch ELENI #[id number]     Start of Mother's Information      Delivery Blood Loss  07/20/24 1353 - 07/21/24 0231      None                 End of Mother's Information  Mother: Litzy Hatch D #[id number]                Delivery - Provider to Complete ([id number])    Delivering clinician: Deepa De La Vega MD  Delivery Type (Choose the 1  that will go to the Birth History): Vaginal, Spontaneous                                           Placenta    Date/Time: 7/21/2024  2:10 AM  Removal: Spontaneous  Disposition: Hospital disposal             Anesthesia    Method: Nitrous Oxide                    Presentation and Position    Presentation: Vertex    Position: Middle Occiput Anterior                     Deepa De La Vega MD

## 2024-07-21 NOTE — PROGRESS NOTES
Patient fabricio q3-6 minutes at this time. Palpating moderate. Pt states contractions are increasing in intensity. Pt requested the pitocin to not be increased at 2030 to 3 milliunits/min. Pitocin remained at 2 milliunits/min. At 2105, Pt requested pitocin to be lowered to 1 miliiunit/min. RN educated patient of R CAT 1 and ideal contraction pattern to be 2-3 minutes apart. Plan to check patient after penicillin is done and see if she has made change. Pt agreeable to adjusting pitocin as needed following check.     Meme Pak RN

## 2024-07-22 VITALS
DIASTOLIC BLOOD PRESSURE: 70 MMHG | SYSTOLIC BLOOD PRESSURE: 99 MMHG | RESPIRATION RATE: 16 BRPM | HEART RATE: 92 BPM | TEMPERATURE: 97.6 F

## 2024-07-22 PROCEDURE — 250N000013 HC RX MED GY IP 250 OP 250 PS 637: Performed by: FAMILY MEDICINE

## 2024-07-22 RX ADMIN — IBUPROFEN 800 MG: 800 TABLET, FILM COATED ORAL at 06:09

## 2024-07-22 RX ADMIN — ACETAMINOPHEN 650 MG: 325 TABLET ORAL at 00:17

## 2024-07-22 RX ADMIN — ACETAMINOPHEN 650 MG: 325 TABLET ORAL at 06:09

## 2024-07-22 RX ADMIN — DOCUSATE SODIUM 100 MG: 100 CAPSULE, LIQUID FILLED ORAL at 10:13

## 2024-07-22 RX ADMIN — ACETAMINOPHEN 650 MG: 325 TABLET ORAL at 10:13

## 2024-07-22 ASSESSMENT — ACTIVITIES OF DAILY LIVING (ADL)
ADLS_ACUITY_SCORE: 18

## 2024-07-22 NOTE — PLAN OF CARE
Problem: Adult Inpatient Plan of Care  Goal: Optimal Comfort and Wellbeing  Outcome: Progressing  Intervention: Monitor Pain and Promote Comfort  Recent Flowsheet Documentation  Taken 2024 2330 by Meme Pak RN  Pain Management Interventions: rest  Intervention: Provide Person-Centered Care  Recent Flowsheet Documentation  Taken 2024 0230 by Meme Pak RN  Trust Relationship/Rapport:   care explained   emotional support provided   choices provided   questions answered   empathic listening provided   questions encouraged   reassurance provided   thoughts/feelings acknowledged     Problem: Breastfeeding  Goal: Effective Breastfeeding  Outcome: Progressing   Goal Outcome Evaluation:       Patients vitals WDL. Fundus firm and at midline. Bleeding is scant. Pain controlled by tylenol and ibuprofen. Ambulates and voids independently. Bonding well with . Plan to discharge today.

## 2024-07-22 NOTE — PLAN OF CARE
Problem: Breastfeeding  Goal: Effective Breastfeeding  Outcome: Met     Problem: Postpartum (Vaginal Delivery)  Goal: Absence of Infection Signs and Symptoms  Outcome: Met     Problem: Postpartum (Vaginal Delivery)  Goal: Successful Parent Role Transition  Outcome: Met     Ready for discharge to home. Discharge instructions reviewed and given to patient.

## 2024-07-22 NOTE — DISCHARGE INSTRUCTIONS
Warning Signs after Having a Baby    Keep this paper on your fridge or somewhere else where you can see it.    Call your provider if you have any of these symptoms up to 12 weeks after having your baby.    Thoughts of hurting yourself or your baby  Pain in your chest or trouble breathing  Severe headache not helped by pain medicine  Eyesight concerns (blurry vision, seeing spots or flashes of light, other changes to eyesight)  Fainting, shaking or other signs of a seizure    Call 9-1-1 if you feel that it is an emergency.     The symptoms below can happen to anyone after giving birth. They can be very serious. Call your provider if you have any of these warning signs.    My provider s phone number: _______________________    Losing too much blood (hemorrhage)    Call your provider if you soak through a pad in less than an hour or pass blood clots bigger than a golf ball. These may be signs that you are bleeding too much.    Blood clots in the legs or lungs    After you give birth, your body naturally clots its blood to help prevent blood loss. Sometimes this increased clotting can happen in other areas of the body, like the legs or lungs. This can block your blood flow and be very dangerous.     Call your provider if you:  Have a red, swollen spot on the back of your leg that is warm or painful when you touch it.   Are coughing up blood.     Infection    Call your provider if you have any of these symptoms:  Fever of 100.4 F (38 C) or higher.  Pain or redness around your stitches if you had an incision.   Any yellow, white, or green fluid coming from places where you had stitches or surgery.    Mood Problems (postpartum depression)    Many people feel sad or have mood changes after having a baby. But for some people, these mood swings are worse.     Call your provider right away if you feel so anxious or nervous that you can't care for yourself or your baby.    Preeclampsia (high blood pressure)    Even if you  didn't have high blood pressure when you were pregnant, you are at risk for the high blood pressure disease called preeclampsia. This risk can last up to 12 weeks after giving birth.     Call your provider if you have:   Pain on your right side under your rib cage  Sudden swelling in the hands and face    Remember: You know your body. If something doesn't feel right, get medical help.     For informational purposes only. Not to replace the advice of your health care provider. Copyright 2020 [facility]. All rights reserved. Clinically reviewed by Meghna Gray, RNC-OB, MSN. [employer] 666146 - Rev 02/23.

## 2024-07-22 NOTE — PROGRESS NOTES
[facility]    Post-Partum Progress Note    Assessment & Plan   Assessment:  Post-partum day #1  Normal spontaneous vaginal delivery    Doing well.  No excessive bleeding  Pain well-controlled.    Plan:  Ambulation encouraged  Breast feeding strategies discussed  Pain control measures as needed  Discharge later today    Deepa De La Vega MD     Interval History   Doing well.  Pain is well-controlled.  No fevers.  No history of foul-smelling vaginal discharge.  Good appetite.  Denies chest pain, shortness of breath, nausea or vomiting.  Vaginal bleeding is similar to a heavy menstrual flow.  Ambulatory.  Breastfeeding well.    Medications   Current Facility-Administered Medications   Medication Dose Route Frequency Provider Last Rate Last Admin    No MMR Needed - Assessment: Patient does not need MMR vaccine   Does not apply Continuous PRN Deepa De La Vega MD        No Tdap Needed - Assessment: Patient does not need Tdap vaccine   Does not apply Continuous PRN Deepa De La Vega MD        oxytocin (PITOCIN) 30 units in 500 mL 0.9% NaCl infusion  340 mL/hr Intravenous Continuous PRN Deepa De La Vega MD        oxytocin (PITOCIN) 30 units in 500 mL 0.9% NaCl infusion  100-340 mL/hr Intravenous Continuous PRN Deepa De La Vega  mL/hr at 07/21/24 0211 340 mL/hr at 07/21/24 0211     Current Facility-Administered Medications   Medication Dose Route Frequency Provider Last Rate Last Admin    docusate sodium (COLACE) capsule 100 mg  100 mg Oral Daily Deepa De La Vega MD           Physical Exam   Temp: 97.6  F (36.4  C) Temp src: Oral BP: 104/69 Pulse: 92   Resp: 18        There were no vitals filed for this visit.  Vital Signs with Ranges  Temp:  [97.6  F (36.4  C)-98.2  F (36.8  C)] 97.6  F (36.4  C)  Pulse:  [92] 92  Resp:  [16-18] 18  BP: (101-114)/(68-77) 104/69  No intake/output data recorded.    Uterine fundus is firm, non-tender and at the level of the  umbilicus  Extremities Non-tender  Heart is regular rate and rhythm and lungs clear to auscultation    Data   Recent Labs   Lab Test 07/20/24  0428 01/10/22  2236 07/08/21  1459   ABO  --   --  O   RH  --   --  Pos   AS Negative   < > Neg    < > = values in this interval not displayed.     Recent Labs   Lab Test 07/20/24 0428 07/03/24  1233   HGB 12.3 11.7     Recent Labs   Lab Test 12/28/23  1636   RUQIGG Positive

## 2024-07-22 NOTE — DISCHARGE SUMMARY
[facility]    Discharge Summary  Obstetrics    Date of Admission:  2024  Date of Discharge:  2024  Discharging Provider: Deepa De La Vega MD    Discharge Diagnoses       History of Present Illness   Litzy Hatch is a 24 year old female who presented with SROM - clear fluid    Hospital Course      Patient presented to labor and delivery after SROM at 2am on 2024.  She is group B strep positive and was started on antibiotics upon admission.  She was allowed to labor with a cervical check at 11 AM at 2.5 cm with a repeat check a few hours later basically unchanged.  She did many position changes and walking and pumping.  Repeat cervical check at 7 PM was around 3.5 cm and she consented to low-dose Pitocin.  1 milliunit of Pitocin was started with good results.  She became very uncomfortable and was checked again at around 10:30 PM and was 6 cm.  I was called in for delivery at that time due to her discomfort.  She was checked again around 1130 and found to be 7 cm.  She then opted to use nitrous for pain control.       She was once again checked at around 115 and found to be complete.  She mostly labored on her hands and knees and deliver this way as well.    The patient's hospital course was unremarkable.  She recovered as anticipated and experienced no post-delivery complications.  On discharge, her pain was well controlled. Vaginal bleeding is similar to peak menstrual flow.  Voiding without difficulty.  Ambulating well and tolerating a normal diet.  No fevers.  Breastfeeding well.  Infant is stable.  She was discharged on post-partum day #2.    Post-partum hemoglobin:   Hemoglobin   Date Value Ref Range Status   2024 12.3 11.7 - 15.7 g/dL Final   2021 11.5 (L) 11.7 - 15.7 g/dL Final       [place] Depression Scale  Thoughts of Harming Self:    Total Score:        Deepa De La Vega MD    Discharge Disposition   Discharged to home    Condition at discharge: Stable    Primary Care Physician   La Nena Hightower    Consultations This Hospital Stay   LACTATION IP CONSULT    Discharge Orders   No discharge procedures on file.  Discharge Medications   Current Discharge Medication List        CONTINUE these medications which have NOT CHANGED    Details   clindamycin (CLEOCIN T) 1 % external solution Apply topically 2 times daily as needed (acne)  Qty: 30 mL, Refills: 2    Associated Diagnoses: Acne vulgaris      Continuous Glucose Sensor (DEXCOM G6 SENSOR) MISC       Continuous Glucose Transmitter (DEXCOM G6 TRANSMITTER) MISC       ferrous sulfate (FEROSUL) 325 (65 Fe) MG tablet Take 1 tablet (325 mg) by mouth daily (with breakfast)  Qty: 30 tablet, Refills: 4    Associated Diagnoses: Anemia during pregnancy in second trimester      hydrOXYzine HCl (ATARAX) 25 MG tablet Take 1-2 tablets (25-50 mg) by mouth every 8 hours as needed for anxiety or other (as needed for panic attacks)  Qty: 30 tablet, Refills: 1    Associated Diagnoses: CHRISTINA (generalized anxiety disorder); Panic attack      magnesium gluconate (MAGONATE) 500 (27 Mg) MG tablet Take 500 mg by mouth 2 times daily prn      metoclopramide (REGLAN) 5 MG tablet       ondansetron (ZOFRAN ODT) 4 MG ODT tab DISSOLVE ONE TABLET ON TONGUE EVERY 8 HOURS AS NEEDED FOR NAUSEA  Qty: 90 tablet, Refills: 1    Associated Diagnoses: Nausea and vomiting of pregnancy, antepartum      Prenatal Vit-Fe Fumarate-FA (PRENATAL MULTIVITAMIN W/IRON) 27-0.8 MG tablet Take 1 tablet by mouth daily      promethazine (PHENERGAN) 25 MG tablet       SYNTHROID 112 MCG tablet Take 1 tablet (112 mcg) by mouth daily  Qty: 90 tablet, Refills: 4    Associated Diagnoses: Subclinical hypothyroidism; Pregnancy, incidental      vitamin C (ASCORBIC ACID) 250 MG tablet Take 1 tablet (250 mg) by mouth daily  Qty: 30 tablet, Refills: 4    Associated Diagnoses: Anemia during pregnancy in second trimester      Vitamin D,  Cholecalciferol, 25 MCG (1000 UT) CAPS Take 2 tablets by mouth daily           Allergies   Allergies   Allergen Reactions    Gluten Meal Dermatitis, Diarrhea, GI Disturbance, Itching, Nausea and Rash

## 2024-07-23 ENCOUNTER — TELEPHONE (OUTPATIENT)
Dept: FAMILY MEDICINE | Facility: CLINIC | Age: 24
End: 2024-07-23
Payer: COMMERCIAL

## 2024-07-23 NOTE — TELEPHONE ENCOUNTER
07/23/24  Forms/Letter Request    Type of form/letter:  Short Term Disability    Have you been seen for this request: Yes     Do we have the form/letter: Yes: in [place] folder    When is form/letter needed by: 07/26/24    How would you like the form/letter returned: Fax [phone]

## 2024-08-14 NOTE — PROCEDURE: MEDICATION COUNSELING
Topical Clindamycin Pregnancy And Lactation Text: This medication is Pregnancy Category B and is considered safe during pregnancy. It is unknown if it is excreted in breast milk. Benzoyl Peroxide Counseling: Patient counseled that medicine may cause skin irritation and bleach clothing.  In the event of skin irritation, the patient was advised to reduce the amount of the drug applied or use it less frequently.   The patient verbalized understanding of the proper use and possible adverse effects of benzoyl peroxide.  All of the patient's questions and concerns were addressed. Azelaic Acid Counseling: Patient counseled that medicine may cause skin irritation and to avoid applying near the eyes.  In the event of skin irritation, the patient was advised to reduce the amount of the drug applied or use it less frequently.   The patient verbalized understanding of the proper use and possible adverse effects of azelaic acid.  All of the patient's questions and concerns were addressed. Isotretinoin Counseling: Patient should get monthly blood tests, not donate blood, not drive at night if vision affected, not share medication, and not undergo elective surgery for 6 months after tx completed. Side effects reviewed, pt to contact office should one occur. Sarecycline Pregnancy And Lactation Text: This medication is Pregnancy Category D and not consider safe during pregnancy. It is also excreted in breast milk. High Dose Vitamin A Counseling: Side effects reviewed, pt to contact office should one occur. Topical Sulfur Applications Pregnancy And Lactation Text: This medication is Pregnancy Category C and has an unknown safety profile during pregnancy. It is unknown if this topical medication is excreted in breast milk. Dapsone Pregnancy And Lactation Text: This medication is Pregnancy Category C and is not considered safe during pregnancy or breast feeding. Spironolactone Pregnancy And Lactation Text: This medication can cause feminization of the male fetus and should be avoided during pregnancy. The active metabolite is also found in breast milk. Birth Control Pills Pregnancy And Lactation Text: This medication should be avoided if pregnant and for the first 30 days post-partum. Aklief counseling:  Patient advised to apply a pea-sized amount only at bedtime and wait 30 minutes after washing their face before applying.  If too drying, patient may add a non-comedogenic moisturizer.  The most commonly reported side effects including irritation, redness, scaling, dryness, stinging, burning, itching, and increased risk of sunburn.  The patient verbalized understanding of the proper use and possible adverse effects of retinoids.  All of the patient's questions and concerns were addressed. Topical Retinoid Pregnancy And Lactation Text: This medication is Pregnancy Category C. It is unknown if this medication is excreted in breast milk. Detail Level: Detailed Winlevi Counseling:  I discussed with the patient the risks of topical clascoterone including but not limited to erythema, scaling, itching, and stinging. Patient voiced their understanding. Tetracycline Counseling: Patient counseled regarding possible photosensitivity and increased risk for sunburn.  Patient instructed to avoid sunlight, if possible.  When exposed to sunlight, patients should wear protective clothing, sunglasses, and sunscreen.  The patient was instructed to call the office immediately if the following severe adverse effects occur:  hearing changes, easy bruising/bleeding, severe headache, or vision changes.  The patient verbalized understanding of the proper use and possible adverse effects of tetracycline.  All of the patient's questions and concerns were addressed. Patient understands to avoid pregnancy while on therapy due to potential birth defects. Doxycycline Counseling:  Patient counseled regarding possible photosensitivity and increased risk for sunburn.  Patient instructed to avoid sunlight, if possible.  When exposed to sunlight, patients should wear protective clothing, sunglasses, and sunscreen.  The patient was instructed to call the office immediately if the following severe adverse effects occur:  hearing changes, easy bruising/bleeding, severe headache, or vision changes.  The patient verbalized understanding of the proper use and possible adverse effects of doxycycline.  All of the patient's questions and concerns were addressed. Azithromycin Pregnancy And Lactation Text: This medication is considered safe during pregnancy and is also secreted in breast milk. High Dose Vitamin A Pregnancy And Lactation Text: High dose vitamin A therapy is contraindicated during pregnancy and breast feeding. Tazorac Pregnancy And Lactation Text: This medication is not safe during pregnancy. It is unknown if this medication is excreted in breast milk. Bactrim Pregnancy And Lactation Text: This medication is Pregnancy Category D and is known to cause fetal risk.  It is also excreted in breast milk. Erythromycin Counseling:  I discussed with the patient the risks of erythromycin including but not limited to GI upset, allergic reaction, drug rash, diarrhea, increase in liver enzymes, and yeast infections. Dapsone Counseling: I discussed with the patient the risks of dapsone including but not limited to hemolytic anemia, agranulocytosis, rashes, methemoglobinemia, kidney failure, peripheral neuropathy, headaches, GI upset, and liver toxicity.  Patients who start dapsone require monitoring including baseline LFTs and weekly CBCs for the first month, then every month thereafter.  The patient verbalized understanding of the proper use and possible adverse effects of dapsone.  All of the patient's questions and concerns were addressed. Isotretinoin Pregnancy And Lactation Text: This medication is Pregnancy Category X and is considered extremely dangerous during pregnancy. It is unknown if it is excreted in breast milk. Spironolactone Counseling: Patient advised regarding risks of diarrhea, abdominal pain, hyperkalemia, birth defects (for female patients), liver toxicity and renal toxicity. The patient may need blood work to monitor liver and kidney function and potassium levels while on therapy. The patient verbalized understanding of the proper use and possible adverse effects of spironolactone.  All of the patient's questions and concerns were addressed. Include Pregnancy/Lactation Warning?: No Azelaic Acid Pregnancy And Lactation Text: This medication is considered safe during pregnancy and breast feeding. Topical Clindamycin Counseling: Patient counseled that this medication may cause skin irritation or allergic reactions.  In the event of skin irritation, the patient was advised to reduce the amount of the drug applied or use it less frequently.   The patient verbalized understanding of the proper use and possible adverse effects of clindamycin.  All of the patient's questions and concerns were addressed. Birth Control Pills Counseling: Birth Control Pill Counseling: I discussed with the patient the potential side effects of OCPs including but not limited to increased risk of stroke, heart attack, thrombophlebitis, deep venous thrombosis, hepatic adenomas, breast changes, GI upset, headaches, and depression.  The patient verbalized understanding of the proper use and possible adverse effects of OCPs. All of the patient's questions and concerns were addressed. Erythromycin Pregnancy And Lactation Text: This medication is Pregnancy Category B and is considered safe during pregnancy. It is also excreted in breast milk. Benzoyl Peroxide Pregnancy And Lactation Text: This medication is Pregnancy Category C. It is unknown if benzoyl peroxide is excreted in breast milk. Topical Sulfur Applications Counseling: Topical Sulfur Counseling: Patient counseled that this medication may cause skin irritation or allergic reactions.  In the event of skin irritation, the patient was advised to reduce the amount of the drug applied or use it less frequently.   The patient verbalized understanding of the proper use and possible adverse effects of topical sulfur application.  All of the patient's questions and concerns were addressed. Bactrim Counseling:  I discussed with the patient the risks of sulfa antibiotics including but not limited to GI upset, allergic reaction, drug rash, diarrhea, dizziness, photosensitivity, and yeast infections.  Rarely, more serious reactions can occur including but not limited to aplastic anemia, agranulocytosis, methemoglobinemia, blood dyscrasias, liver or kidney failure, lung infiltrates or desquamative/blistering drug rashes. Doxycycline Pregnancy And Lactation Text: This medication is Pregnancy Category D and not consider safe during pregnancy. It is also excreted in breast milk but is considered safe for shorter treatment courses. Tazorac Counseling:  Patient advised that medication is irritating and drying.  Patient may need to apply sparingly and wash off after an hour before eventually leaving it on overnight.  The patient verbalized understanding of the proper use and possible adverse effects of tazorac.  All of the patient's questions and concerns were addressed. Winlevi Pregnancy And Lactation Text: This medication is considered safe during pregnancy and breastfeeding. Topical Retinoid counseling:  Patient advised to apply a pea-sized amount only at bedtime and wait 30 minutes after washing their face before applying.  If too drying, patient may add a non-comedogenic moisturizer. The patient verbalized understanding of the proper use and possible adverse effects of retinoids.  All of the patient's questions and concerns were addressed. Azithromycin Counseling:  I discussed with the patient the risks of azithromycin including but not limited to GI upset, allergic reaction, drug rash, diarrhea, and yeast infections. Sarecycline Counseling: Patient advised regarding possible photosensitivity and discoloration of the teeth, skin, lips, tongue and gums.  Patient instructed to avoid sunlight, if possible.  When exposed to sunlight, patients should wear protective clothing, sunglasses, and sunscreen.  The patient was instructed to call the office immediately if the following severe adverse effects occur:  hearing changes, easy bruising/bleeding, severe headache, or vision changes.  The patient verbalized understanding of the proper use and possible adverse effects of sarecycline.  All of the patient's questions and concerns were addressed. Aklief Pregnancy And Lactation Text: It is unknown if this medication is safe to use during pregnancy.  It is unknown if this medication is excreted in breast milk.  Breastfeeding women should use the topical cream on the smallest area of the skin for the shortest time needed while breastfeeding.  Do not apply to nipple and areola. Minocycline Counseling: Patient advised regarding possible photosensitivity and discoloration of the teeth, skin, lips, tongue and gums.  Patient instructed to avoid sunlight, if possible.  When exposed to sunlight, patients should wear protective clothing, sunglasses, and sunscreen.  The patient was instructed to call the office immediately if the following severe adverse effects occur:  hearing changes, easy bruising/bleeding, severe headache, or vision changes.  The patient verbalized understanding of the proper use and possible adverse effects of minocycline.  All of the patient's questions and concerns were addressed. Rifampin Counseling: I discussed with the patient the risks of rifampin including but not limited to liver damage, kidney damage, red-orange body fluids, nausea/vomiting and severe allergy.

## 2024-08-28 ENCOUNTER — MEDICAL CORRESPONDENCE (OUTPATIENT)
Dept: HEALTH INFORMATION MANAGEMENT | Facility: CLINIC | Age: 24
End: 2024-08-28
Payer: COMMERCIAL

## 2024-08-29 ENCOUNTER — PRENATAL OFFICE VISIT (OUTPATIENT)
Dept: FAMILY MEDICINE | Facility: CLINIC | Age: 24
End: 2024-08-29
Payer: COMMERCIAL

## 2024-08-29 VITALS
OXYGEN SATURATION: 96 % | RESPIRATION RATE: 14 BRPM | HEIGHT: 69 IN | WEIGHT: 155.2 LBS | DIASTOLIC BLOOD PRESSURE: 72 MMHG | BODY MASS INDEX: 22.99 KG/M2 | HEART RATE: 91 BPM | SYSTOLIC BLOOD PRESSURE: 106 MMHG

## 2024-08-29 DIAGNOSIS — O24.410 DIET CONTROLLED GESTATIONAL DIABETES MELLITUS (GDM) IN THIRD TRIMESTER: ICD-10-CM

## 2024-08-29 DIAGNOSIS — F41.1 GAD (GENERALIZED ANXIETY DISORDER): ICD-10-CM

## 2024-08-29 DIAGNOSIS — R76.8 ANTI-TPO ANTIBODIES PRESENT: ICD-10-CM

## 2024-08-29 DIAGNOSIS — B35.1 FUNGAL INFECTION OF NAIL: ICD-10-CM

## 2024-08-29 DIAGNOSIS — E03.8 SUBCLINICAL HYPOTHYROIDISM: ICD-10-CM

## 2024-08-29 DIAGNOSIS — E06.3 HASHIMOTO'S THYROIDITIS: ICD-10-CM

## 2024-08-29 DIAGNOSIS — D80.2 IGA DEFICIENCY (H): ICD-10-CM

## 2024-08-29 LAB
FERRITIN SERPL-MCNC: 82 NG/ML (ref 6–175)
HBA1C MFR BLD: 5.3 % (ref 0–5.6)
HGB BLD-MCNC: 13.3 G/DL (ref 11.7–15.7)
INSULIN SERPL-ACNC: 4.7 UU/ML (ref 2.6–24.9)
TSH SERPL DL<=0.005 MIU/L-ACNC: 0.34 UIU/ML (ref 0.3–4.2)
VIT D+METAB SERPL-MCNC: 57 NG/ML (ref 20–50)

## 2024-08-29 PROCEDURE — 83036 HEMOGLOBIN GLYCOSYLATED A1C: CPT | Performed by: FAMILY MEDICINE

## 2024-08-29 PROCEDURE — 82306 VITAMIN D 25 HYDROXY: CPT | Performed by: FAMILY MEDICINE

## 2024-08-29 PROCEDURE — 84443 ASSAY THYROID STIM HORMONE: CPT | Performed by: FAMILY MEDICINE

## 2024-08-29 PROCEDURE — 99207 PR POST PARTUM EXAM: CPT | Performed by: FAMILY MEDICINE

## 2024-08-29 PROCEDURE — 83525 ASSAY OF INSULIN: CPT | Performed by: FAMILY MEDICINE

## 2024-08-29 PROCEDURE — 99213 OFFICE O/P EST LOW 20 MIN: CPT | Mod: 24 | Performed by: FAMILY MEDICINE

## 2024-08-29 PROCEDURE — 36415 COLL VENOUS BLD VENIPUNCTURE: CPT | Performed by: FAMILY MEDICINE

## 2024-08-29 PROCEDURE — 85018 HEMOGLOBIN: CPT | Performed by: FAMILY MEDICINE

## 2024-08-29 PROCEDURE — 82728 ASSAY OF FERRITIN: CPT | Performed by: FAMILY MEDICINE

## 2024-08-29 RX ORDER — LEVOTHYROXINE SODIUM 112 MCG
112 TABLET ORAL DAILY
Qty: 90 TABLET | Refills: 4 | Status: SHIPPED | OUTPATIENT
Start: 2024-08-29

## 2024-08-29 RX ORDER — CICLOPIROX 80 MG/ML
SOLUTION TOPICAL
Qty: 6.6 ML | Refills: 11 | Status: SHIPPED | OUTPATIENT
Start: 2024-08-29

## 2024-08-29 NOTE — PROGRESS NOTES
Assessment/plan   Litzy Hatch is a 24 year old female who is established to my practice, here for a 5 week post partum visit.      Routine postpartum follow up  - Mood is good, declines need for additional resources. Mindfulness and exercise reviewed.  - Pap is not indicated  - Hb will be rechecked with ferritin  - Continue prenatal vitamin  - Continue increasing iron containing foods in diet  - Contraception:  natural family planning (NFP)  -     Hemoglobin A1c  -     TSH  -     Hemoglobin  -     Vitamin D deficiency screening  -     Ferritin; Future    Subclinical hypothyroidism  Hashimoto's thyroiditis  Anti-TPO antibodies present  I can help resume her medical care for the Hashimoto's. On daily 112mcg brand name Synthroid for TSH stability   -     TSH; Future  -     SYNTHROID 112 MCG tablet; Take 1 tablet (112 mcg) by mouth daily.    CHRISTINA (generalized anxiety disorder)  Improving, not needing hydroxyzine. EDPS score of 1 today     Diet controlled gestational diabetes mellitus (GDM) in third trimester  Defer 2hr testing for now based on excelled CGM readings; A1c today is 5.3%   Advised to stop checking BS/using CGM at this point (as there's been some increase in food anxiety/relationship with eating)  -     Hemoglobin A1c  -     Insulin level    GERD  We talked about possible EGD to eval for hiatal hernia; since she has hx hyperemesis/severe GERD with pregnancies and wants to feel good for future pregnancies if possible    IgA deficiency (H)  The patient has selective IgA deficiency. The patient is at risk for an allergic reaction to any injected plasma-containing blood products, and testing     Fungal infection of nail  Reviewed that I primarily suspect that she had microtrauma to the great toenail causing it to now separate rather than any true fungal overlay however she would like to use Penlac which is fairly benign in nature and can apply that as necessary. Minimal absorption likely with  breastfeeding  -     ciclopirox (PENLAC) 8 % external solution; Apply to adjacent skin and affected nails daily.  Remove with alcohol every 7 days, then repeat.    Other orders  -     PRIMARY CARE FOLLOW-UP SCHEDULING; Future      Follow up: for contraception management, mood check or annual exam as needed    La Nena Hightower MD    Subjective:      HPI: Litzy Hatch is a 24 year old female who is here for post partum visit.    Post partum check: I have fully reviewed the prenatal and intrapartum course.  40w0d at time of delivery.  Outcome: spontaneous vaginal delivery  Delivery course & outcome: Per Dr Wade's delivery note: michael presented to labor and delivery after SROM at 2am on 7/20/2024.  She is group B strep positive and was started on antibiotics upon admission.  She was allowed to labor with a cervical check at 11 AM at 2.5 cm with a repeat check a few hours later basically unchanged.  She did many position changes and walking and pumping.  Repeat cervical check at 7 PM was around 3.5 cm and she consented to low-dose Pitocin.  1 milliunit of Pitocin was started with good results.  She became very uncomfortable and was checked again at around 10:30 PM and was 6 cm.  I was called in for delivery at that time due to her discomfort.  She was checked again around 1130 and found to be 7 cm.  She then opted to use nitrous for pain control.       She was once again checked at around 115 and found to be complete.  She mostly labored on her hands and knees and deliver this way as well.      Delivery was via vaginal, spontaneous  to a sterile field under nitrous oxide  anesthesia. Infant delivered in vertex  middle  occiput  anterior  position. Anterior and posterior shoulders delivered without difficulty. The cord was clamped, cut twice and 3 vessels  were noted. Cord blood was obtained in routine fashion with the following disposition: lab .      Postpartum course has been uncomplicated.  Baby's course  has been uncomplicated.  She is currently breastfeeding      Patient has resumed intercourse with no concerns.   Contraception methods discussed per plan above.    Bleeding pattern: normal for postpartum course; normal for postpartum course, no significant clots or cramping  Bowel function: normal, colace reviewed as needed  Bladder function: normal    Postpartum depression screening score: 1   [place]  Depression Scale [unfilled]    Other medical conditions that need follow up: Thyroid, anxiety, iron as discussed in plan above  Right great toe- nail is starting to avulse - ?trauma from tight shoes  Wants to still try the penlac    Still having some reflux; with 1st daughter it went away after delivery right away  With Brady though it's not better  She doesn't have heartburn sensation; just the medication refluxing up after taking her prenatals; doesn't happen with food    We talked about possible EGD to eval for hiatal hernia; since she has hx hyperemesis/severe GERD with pregnancies and wants to feel good for future pregnancies if possible    She monitors her blood sugars with Dexcom CGM: between meals 90s, after meals 110s. Fasting blood sugars  are   She is really trying to watch carbs and not eat fruit even.   Doing a snack before bed- greek yogurt/berries with heavy whip for the fat source; coconut parfaits.     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  Remove with alcohol every 7 days, then repeat. 6.6 mL 11    Continuous Glucose Sensor (DEXCOM G6 SENSOR) MISC       Continuous Glucose Transmitter (DEXCOM G6 TRANSMITTER) MISC       ferrous sulfate (FEROSUL) 325 (65 Fe) MG tablet Take 1 tablet (325 mg) by mouth daily (with breakfast) 30 tablet 4    magnesium gluconate (MAGONATE) 500 (27 Mg) MG tablet Take 500 mg by mouth 2 times daily prn      Prenatal Vit-Fe Fumarate-FA (PRENATAL MULTIVITAMIN W/IRON) 27-0.8 MG tablet Take 1 tablet by mouth daily      SYNTHROID 112 MCG tablet Take 1 tablet (112 mcg) by mouth daily. 90 tablet 4    vitamin C (ASCORBIC ACID) 250 MG tablet Take 1 tablet (250 mg) by mouth daily 30 tablet 4    Vitamin D, Cholecalciferol, 25 MCG (1000 UT) CAPS Take 2 tablets by mouth daily         Imaging & Labs reviewed this visit:   Lab Results   Component Value Date    WBC 6.5 05/31/2024    HGB 13.3 08/29/2024    HCT 35.6 05/31/2024    MCV 85 05/31/2024     05/31/2024     Lab Results   Component Value Date    TSH 2.01 07/03/2024     No results found for: \"HGBA1C\"    Objective:      Vitals:    08/29/24 0819   BP: 106/72   Pulse: 91   Resp: 14   SpO2: 96%   Weight: 70.4 kg (155 lb 3.2 oz)   Height: 1.753 m (5' 9\")       Physical Exam:     General: Alert, no acute distress.   HEET: normocephalic conjunctivae are clear, Nose is clear.  Oropharynx is moist and clear, without tonsillar hypertrophy, asymmetry, exudate or lesions.   Neck: supple without adenopathy or thyromegaly.  Back: Symmetric, no curvature, ROM normal, no CVA tenderness  Breasts: pt declined exam  Lungs: Good aeration bilaterally. Clear to auscultation without wheezes, rales or rhonci.   Heart: regular rate and rhythm, normal S1 and S2, no murmurs  Abdomen: soft and " nontender, no masses, no organomegaly  Skin: clear without rash or lesions  Neuro: alert, interactive moving all extremities equally, normal muscle tone in all 4 extremities, deep tendon reflexes 2+ symmetrically at the patella  Pelvic: pt declined exam.   Extremities: Extremities normal, atraumatic, no cyanosis or edema  Lymph nodes: Cervical &  supraclavicular nodes normal    Right great toe- nail is starting to avulse from the base;  yellowish-white discoloration and thickening noted

## 2024-09-11 ENCOUNTER — PATIENT OUTREACH (OUTPATIENT)
Dept: CARE COORDINATION | Facility: CLINIC | Age: 24
End: 2024-09-11
Payer: COMMERCIAL

## 2024-09-11 ASSESSMENT — MIGRAINE DISABILITY ASSESSMENT (MIDAS)
TOTAL SCORE: 9
ON A SCALE FROM 0-10 ON AVERAGE HOW PAINFUL WERE HEADACHES: 6
HOW MANY DAYS DID YOU MISS WORK OR SCHOOL BECAUSE OF HEADACHES: 0
HOW MANY DAYS WAS YOUR PRODUCTIVITY CUT IN HALF BECAUSE OF HEADACHES: 0
HOW MANY DAYS IN THE PAST 3 MONTHS HAVE YOU HAD A HEADACHE: 7
HOW MANY DAYS WAS HOUSEWORK PRODUCTIVITY CUT IN HALF DUE TO HEADACHES: 2
HOW OFTEN WERE SOCIAL ACTIVITIES MISSED DUE TO HEADACHES: 2
HOW MANY DAYS DID YOU NOT DO HOUSEWORK BECAUSE OF HEADACHES: 5

## 2024-09-11 ASSESSMENT — HEADACHE IMPACT TEST (HIT 6)
WHEN YOU HAVE A HEADACHE HOW OFTEN DO YOU WISH YOU COULD LIE DOWN: VERY OFTEN
HIT6 TOTAL SCORE: 53
HOW OFTEN HAVE YOU FELT FED UP OR IRRITATED BECAUSE OF YOUR HEADACHES: RARELY
HOW OFTEN HAVE YOU FELT TOO TIRED TO WORK BECAUSE OF YOUR HEADACHES: RARELY
WHEN YOU HAVE HEADACHES HOW OFTEN IS THE PAIN SEVERE: SOMETIMES
HOW OFTEN DO HEADACHES LIMIT YOUR DAILY ACTIVITIES: RARELY
HOW OFTEN DID HEADACHS LIMIT CONCENTRATION ON WORK OR DAILY ACTIVITY: RARELY

## 2024-09-16 ENCOUNTER — OFFICE VISIT (OUTPATIENT)
Dept: NEUROLOGY | Facility: CLINIC | Age: 24
End: 2024-09-16
Payer: COMMERCIAL

## 2024-09-16 VITALS
SYSTOLIC BLOOD PRESSURE: 103 MMHG | OXYGEN SATURATION: 96 % | RESPIRATION RATE: 18 BRPM | HEART RATE: 73 BPM | DIASTOLIC BLOOD PRESSURE: 67 MMHG

## 2024-09-16 DIAGNOSIS — G43.109 MIGRAINE WITH AURA AND WITHOUT STATUS MIGRAINOSUS, NOT INTRACTABLE: Primary | ICD-10-CM

## 2024-09-16 PROCEDURE — G2211 COMPLEX E/M VISIT ADD ON: HCPCS | Performed by: PSYCHIATRY & NEUROLOGY

## 2024-09-16 PROCEDURE — 99213 OFFICE O/P EST LOW 20 MIN: CPT | Performed by: PSYCHIATRY & NEUROLOGY

## 2024-09-16 RX ORDER — ONDANSETRON 4 MG/1
4 TABLET, FILM COATED ORAL EVERY 8 HOURS PRN
Qty: 10 TABLET | Refills: 11 | Status: SHIPPED | OUTPATIENT
Start: 2024-09-16

## 2024-09-16 RX ORDER — SUMATRIPTAN 25 MG/1
25 TABLET, FILM COATED ORAL
Qty: 18 TABLET | Refills: 11 | Status: SHIPPED | OUTPATIENT
Start: 2024-09-16

## 2024-09-16 ASSESSMENT — PAIN SCALES - GENERAL: PAINLEVEL: NO PAIN (0)

## 2024-09-16 NOTE — PROGRESS NOTES
[facility]    Headache Neurology Progress Note  September 16, 2024      Assessment/Plan:   Litzy Hatch is a 24 year old woman who returns for follow-up of migraine with visual aura.  She experienced visual aura during her pregnancy, with a few migraine attacks.  Since the birth of her child, she has had 2-3 migraine attacks monthly.  We anticipate she will continue with the current frequency until she stops breast-feeding, at which time there may be change again.  She is currently breast-feeding.    We will continue the current plan with avoiding triggers (iron supplementation, neti pot) and utilizing magnesium glycinate daily for headache prevention.  She will let me know if an order for physical therapy is needed.    We reviewed a symptomatic treatment strategy.  -For acute treatment of moderate to severe headache, sumatriptan 25 mg at the onset of headache, with repeat dose in 2 hours if needed.  This should not exceed more than 9 days/month to avoid medication overuse.  -For headache related nausea, I recommend ondansetron 4 mg tablet every 6 hours as needed.  -She will continue Cefaly antimigraine device as needed.     In the future, other preventative treatments for migraine could be considered: CGRP inhibitors, antiseizure medications, tricyclic antidepressants, beta-blockers, botulinum toxin injections at this time.     I will plan to see her back in 1 year, or sooner if needed.    The longitudinal plan of care for Litzy was addressed during this visit. Due to the added complexity in care, I will continue to support Litzy in the subsequent management of this condition(s) and with the ongoing continuity of care of this condition(s).    Liliya Vera MD  Neurology     Subjective:    Litzy Hatch returns for follow up of migraine with visual aura.    Since I last saw her, she had a second child.  During the pregnancy, she used sumatriptan on 2 occasions, with  success.  She utilized ondansetron frequently, due to nausea in pregnancy, not specifically related to migraine.  She experienced visual aura without headache during her pregnancy.    Since the birth of her child about 1 month ago, she has had 2 or 3 migraine attacks.  Overall, headache remains similar to previous.  Attacks have not become frequent.  She is continuing to breast-feed, with a goal of breast-feeding for 1 year.    With her other child, headaches worsened after she stopped breast-feeding.    Sumatriptan 25 mg remains effective as needed.  Zofran is helpful for nausea.    She uses Cefaly antimigraine device, which is effective.    For headache prevention, she is using iron supplementation, Orinda pot daily, and previously found physical therapy very helpful.    She also takes magnesium glycinate 300 to 500 mg daily.    Objective:    Vitals: LMP 10/15/2023 (Exact Date)   General: Cooperative, NAD  Neurologic:  Mental Status: Fully alert, attentive and oriented. Speech clear and fluent.   Cranial Nerves: Facial movements symmetric.   Motor: No abnormal movements.      Pertinent Investigations:          3/27/2023    11:46 AM 8/2/2023    10:38 AM 9/11/2024     9:07 PM   HIT-6   When you have headaches, how often is the pain severe 11 11 10   How often do headaches limit your ability to do usual daily activities including household work, work, school, or social activities? 11 11 8   When you have a headache, how often do you wish you could lie down? 13 13 11   In the past 4 weeks, how often have you felt too tired to do work or daily activities because of your headaches 11 11 8   In the past 4 weeks, how often have you felt fed up or irritated because of your headaches 13 11 8   In the past 4 weeks, how often did headaches limit your ability to concentrate on work or daily activities 11 11 8   HIT-6 Total Score 70 68 53           3/27/2023    11:48 AM 8/2/2023    10:40 AM 9/11/2024     9:08 PM   MIDAS - in  the past three months:   On how many days did you miss work or school because of your headaches? 10 5 0   How many days was your productivity at work or school reduced by half or more because of your headaches? 45 30 0   On how many days did you not do household work because of your headaches? 60 60 5   How many days was your productivity in household work reduced by half or more because of your headaches? 90 30 2   On how many days did you miss family, social, or leisure activities because of your headaches? 60 60 2   On how many days did you have a headache? 75 80 7   On a scale of 0-10, on average how painful were these headaches? 8 8 6   MIDAS Score 265 (IV - Severe Disability) 185 (IV - Severe Disability) 9 (II - Mild Disability)

## 2024-09-16 NOTE — NURSING NOTE
Chief Complaint   Patient presents with    RECHECK     /67 (BP Location: Right arm, Patient Position: Sitting, Cuff Size: Adult Regular)   Pulse 73   Resp 18   LMP 10/15/2023 (Exact Date)   SpO2 96%     RUSS LEANN

## 2024-09-16 NOTE — LETTER
9/16/2024       RE: Litzy Hatch  [address] [place] [address]     Dear Colleague,    Thank you for referring your patient, Litzy Hatch, to the [facility]. Please see a copy of my visit note below.    [facility]    Headache Neurology Progress Note  September 16, 2024      Assessment/Plan:   Litzy Hatch is a 24 year old woman who returns for follow-up of migraine with visual aura.  She experienced visual aura during her pregnancy, with a few migraine attacks.  Since the birth of her child, she has had 2-3 migraine attacks monthly.  We anticipate she will continue with the current frequency until she stops breast-feeding, at which time there may be change again.  She is currently breast-feeding.    We will continue the current plan with avoiding triggers (iron supplementation, neti pot) and utilizing magnesium glycinate daily for headache prevention.  She will let me know if an order for physical therapy is needed.    We reviewed a symptomatic treatment strategy.  -For acute treatment of moderate to severe headache, sumatriptan 25 mg at the onset of headache, with repeat dose in 2 hours if needed.  This should not exceed more than 9 days/month to avoid medication overuse.  -For headache related nausea, I recommend ondansetron 4 mg tablet every 6 hours as needed.  -She will continue Cefaly antimigraine device as needed.     In the future, other preventative treatments for migraine could be considered: CGRP inhibitors, antiseizure medications, tricyclic antidepressants, beta-blockers, botulinum toxin injections at this time.     I will plan to see her back in 1 year, or sooner if needed.    The longitudinal plan of care for Litzy was addressed during this visit. Due to the added complexity in care, I will continue to support Litzy in the subsequent management of  this condition(s) and with the ongoing continuity of care of this condition(s).    Liliya Vera MD  Neurology     Subjective:    Litzy Hatch returns for follow up of migraine with visual aura.    Since I last saw her, she had a second child.  During the pregnancy, she used sumatriptan on 2 occasions, with success.  She utilized ondansetron frequently, due to nausea in pregnancy, not specifically related to migraine.  She experienced visual aura without headache during her pregnancy.    Since the birth of her child about 1 month ago, she has had 2 or 3 migraine attacks.  Overall, headache remains similar to previous.  Attacks have not become frequent.  She is continuing to breast-feed, with a goal of breast-feeding for 1 year.    With her other child, headaches worsened after she stopped breast-feeding.    Sumatriptan 25 mg remains effective as needed.  Zofran is helpful for nausea.    She uses Cefaly antimigraine device, which is effective.    For headache prevention, she is using iron supplementation, Rupali pot daily, and previously found physical therapy very helpful.    She also takes magnesium glycinate 300 to 500 mg daily.    Objective:    Vitals: LMP 10/15/2023 (Exact Date)   General: Cooperative, NAD  Neurologic:  Mental Status: Fully alert, attentive and oriented. Speech clear and fluent.   Cranial Nerves: Facial movements symmetric.   Motor: No abnormal movements.      Pertinent Investigations:          3/27/2023    11:46 AM 8/2/2023    10:38 AM 9/11/2024     9:07 PM   HIT-6   When you have headaches, how often is the pain severe 11 11 10   How often do headaches limit your ability to do usual daily activities including household work, work, school, or social activities? 11 11 8   When you have a headache, how often do you wish you could lie down? 13 13 11   In the past 4 weeks, how often have you felt too tired to do work or daily activities because of your headaches 11 11 8   In the past 4  weeks, how often have you felt fed up or irritated because of your headaches 13 11 8   In the past 4 weeks, how often did headaches limit your ability to concentrate on work or daily activities 11 11 8   HIT-6 Total Score 70 68 53           3/27/2023    11:48 AM 8/2/2023    10:40 AM 9/11/2024     9:08 PM   MIDAS - in the past three months:   On how many days did you miss work or school because of your headaches? 10 5 0   How many days was your productivity at work or school reduced by half or more because of your headaches? 45 30 0   On how many days did you not do household work because of your headaches? 60 60 5   How many days was your productivity in household work reduced by half or more because of your headaches? 90 30 2   On how many days did you miss family, social, or leisure activities because of your headaches? 60 60 2   On how many days did you have a headache? 75 80 7   On a scale of 0-10, on average how painful were these headaches? 8 8 6   MIDAS Score 265 (IV - Severe Disability) 185 (IV - Severe Disability) 9 (II - Mild Disability)          Again, thank you for allowing me to participate in the care of your patient.      Sincerely,    Liliya Vera MD

## 2024-09-25 ENCOUNTER — MEDICAL CORRESPONDENCE (OUTPATIENT)
Dept: HEALTH INFORMATION MANAGEMENT | Facility: CLINIC | Age: 24
End: 2024-09-25
Payer: COMMERCIAL

## 2024-12-03 ENCOUNTER — VIRTUAL VISIT (OUTPATIENT)
Dept: FAMILY MEDICINE | Facility: CLINIC | Age: 24
End: 2024-12-03
Payer: COMMERCIAL

## 2024-12-03 DIAGNOSIS — K21.00 GASTROESOPHAGEAL REFLUX DISEASE WITH ESOPHAGITIS, UNSPECIFIED WHETHER HEMORRHAGE: ICD-10-CM

## 2024-12-03 DIAGNOSIS — E06.3 HASHIMOTO'S THYROIDITIS: ICD-10-CM

## 2024-12-03 DIAGNOSIS — R76.8 ANTI-TPO ANTIBODIES PRESENT: ICD-10-CM

## 2024-12-03 DIAGNOSIS — E03.8 SUBCLINICAL HYPOTHYROIDISM: Primary | ICD-10-CM

## 2024-12-03 DIAGNOSIS — K90.0 CELIAC DISEASE: ICD-10-CM

## 2024-12-03 PROCEDURE — 99213 OFFICE O/P EST LOW 20 MIN: CPT | Mod: 95 | Performed by: FAMILY MEDICINE

## 2024-12-03 PROCEDURE — G2211 COMPLEX E/M VISIT ADD ON: HCPCS | Mod: 95 | Performed by: FAMILY MEDICINE

## 2024-12-03 RX ORDER — LEVOTHYROXINE SODIUM 112 UG/1
112 CAPSULE ORAL DAILY
Qty: 90 CAPSULE | Refills: 3 | Status: SHIPPED | OUTPATIENT
Start: 2024-12-03

## 2024-12-03 NOTE — PROGRESS NOTES
Litzy is a 24 year old who is being evaluated via a billable video visit.    How would you like to obtain your AVS? MyChart  If the video visit is dropped, the invitation should be resent by: Text to cell phone: [phone]  Will anyone else be joining your video visit? No      Assessment & Plan     Subclinical hypothyroidism  Hashimoto's thyroiditis  Anti-TPO antibodies present  Check TSH, change brand Synthroid to brand Tirosint which is gluten free. (See next). Can also check for TPO antibodies again (elevated at 228 approx in March); and then recheck after 3 months on the GF version of levothyroxine       Celiac disease   Given IgA deficiency, Hashimoto's thyroiditis, gluten intolerance, and positive HLA DQ2, she went on gluten free diet years ago. Has opted out of gluten trial/biopsy on endoscopy    She is avoiding EGD due to concerns regarding her symptoms on a gluten challenge.     GERD  Discussed previously goal to consider EGD if sx not improving (hx of severe hyperemesis; possible hiatal hernia or can also eval for markers of celiac disease)  If this change from Synthroid the GF Tirosint does not improve gerd will have low threshold to consider EGD.  - continues prn pepcid/PPI      Follow-up for annual exam in 1 year or sooner    Declines COVID/Flu    The longitudinal plan of care for the diagnosis(es)/condition(s) as documented were addressed during this visit. Due to the added complexity in care, I will continue to support Litzy in the subsequent management and with ongoing continuity of care.    Subjective   Litzy is a 24 year old, presenting for the following health issues:  Medication Request (Out of levothyroxine. /Would like to discuss Tirosint as a new brand due to gluten sensitivity and contamination with Synthroid. Also would like to recheck thyroid labs. (Maybe Wednesday at [facility] child apt?) /Currently taking Synthroid, name brand 112mcg/day. /)    History of Present Illness        Hypothyroidism:     Since last visit, patient describes the following symptoms::  None    She eats 4 or more servings of fruits and vegetables daily.She consumes 0 sweetened beverage(s) daily.She exercises with enough effort to increase her heart rate 20 to 29 minutes per day.  She exercises with enough effort to increase her heart rate 4 days per week. She is missing 1 dose(s) of medications per week.  She is not taking prescribed medications regularly due to remembering to take.     Subclinical hypothyroidism, Hashimoto's thyroiditis;   She has been gluten free for a long time (years) and has labs suggestive of celiac but didn't want to do the gluten trial/bx so has been essentially Celiac lifestyle. (although does not avoid potential cross-contamination 100%).   Reports GI/headache symptoms worsen when she consumes gluten. Has IgA deficiency     Has been on 112mcg Levothyroxine/Synthroid   Has been doing well thyroid wise for stability she thinks but she does still get some heartburn and wonders if that is related to gluten exposure through the Synthroid      she has had positive TPO antibodies as a high as 228 in March. 2024 (Present since 2020 off and on)  Taking synthroid since 2022  Last saw endocrinology 10/2022                   Objective           Vitals:  No vitals were obtained today due to virtual visit.    Physical Exam   GENERAL: alert and no distress  EYES: Eyes grossly normal to inspection.  No discharge or erythema, or obvious scleral/conjunctival abnormalities.  RESP: No audible wheeze, cough, or visible cyanosis.    SKIN: Visible skin clear. No significant rash, abnormal pigmentation or lesions.  NEURO: Cranial nerves grossly intact.  Mentation and speech appropriate for age.  PSYCH: Appropriate affect, tone, and pace of words          Video-Visit Details    Type of service:  Video Visit   Originating Location (pt. Location): Home    Distant Location (provider location):  On-site  Platform  used for Video Visit: Bryan  Signed Electronically by: La Nena Hightower MD

## 2024-12-04 ENCOUNTER — LAB (OUTPATIENT)
Dept: LAB | Facility: CLINIC | Age: 24
End: 2024-12-04
Payer: COMMERCIAL

## 2024-12-04 DIAGNOSIS — E03.8 SUBCLINICAL HYPOTHYROIDISM: ICD-10-CM

## 2024-12-04 DIAGNOSIS — R76.8 ANTI-TPO ANTIBODIES PRESENT: ICD-10-CM

## 2024-12-04 DIAGNOSIS — E06.3 HASHIMOTO'S THYROIDITIS: ICD-10-CM

## 2024-12-04 PROCEDURE — 86376 MICROSOMAL ANTIBODY EACH: CPT

## 2024-12-04 PROCEDURE — 84443 ASSAY THYROID STIM HORMONE: CPT

## 2024-12-04 PROCEDURE — 36415 COLL VENOUS BLD VENIPUNCTURE: CPT

## 2024-12-05 ENCOUNTER — MEDICAL CORRESPONDENCE (OUTPATIENT)
Dept: HEALTH INFORMATION MANAGEMENT | Facility: CLINIC | Age: 24
End: 2024-12-05
Payer: COMMERCIAL

## 2024-12-05 LAB
THYROPEROXIDASE AB SERPL-ACNC: 539 IU/ML
TSH SERPL DL<=0.005 MIU/L-ACNC: 10.7 UIU/ML (ref 0.3–4.2)

## 2024-12-14 ENCOUNTER — MYC MEDICAL ADVICE (OUTPATIENT)
Dept: FAMILY MEDICINE | Facility: CLINIC | Age: 24
End: 2024-12-14
Payer: COMMERCIAL

## 2024-12-16 NOTE — TELEPHONE ENCOUNTER
Outgoing call to patient to triage symptoms as reported in MyChart message. No answer. Left message to call back.    MyChart message sent, as well.     If patient calls back, please triage symptoms.

## 2025-01-21 ENCOUNTER — TELEPHONE (OUTPATIENT)
Dept: FAMILY MEDICINE | Facility: CLINIC | Age: 25
End: 2025-01-21
Payer: COMMERCIAL

## 2025-01-21 NOTE — TELEPHONE ENCOUNTER
Reason for Call:  Appointment Request    Patient requesting this type of appt:  Follow up    Requested provider: La Nena Hightower    Reason patient unable to be scheduled:  Pt is hoping to be double booked at time of her son's appt 01/22/25 @ 11am. She wants to follow up on thyroid med changes     Comments: See above    [facility] or would you prefer to receive a phone call?:   Patient would prefer a phone call   Okay to leave a detailed message?: Yes at Home number on file [phone] (home)    Call taken on 1/21/2025 at 4:29 PM by Nubia Boyce

## 2025-01-22 ENCOUNTER — LAB (OUTPATIENT)
Dept: LAB | Facility: CLINIC | Age: 25
End: 2025-01-22
Payer: COMMERCIAL

## 2025-01-22 DIAGNOSIS — R76.8 ANTI-TPO ANTIBODIES PRESENT: ICD-10-CM

## 2025-01-22 DIAGNOSIS — E03.8 SUBCLINICAL HYPOTHYROIDISM: ICD-10-CM

## 2025-01-22 DIAGNOSIS — E06.3 HASHIMOTO'S THYROIDITIS: ICD-10-CM

## 2025-01-22 PROCEDURE — 84443 ASSAY THYROID STIM HORMONE: CPT

## 2025-01-22 PROCEDURE — 86376 MICROSOMAL ANTIBODY EACH: CPT

## 2025-01-22 PROCEDURE — 36415 COLL VENOUS BLD VENIPUNCTURE: CPT

## 2025-01-22 NOTE — TELEPHONE ENCOUNTER
Reviewed at [facility] for her son; collecting standing TSH orders today. ON Tirosent for a couple months now.

## 2025-01-23 LAB
THYROPEROXIDASE AB SERPL-ACNC: 535 IU/ML
TSH SERPL DL<=0.005 MIU/L-ACNC: 9.16 UIU/ML (ref 0.3–4.2)

## 2025-01-29 DIAGNOSIS — R76.8 ANTI-TPO ANTIBODIES PRESENT: ICD-10-CM

## 2025-01-29 DIAGNOSIS — E03.8 SUBCLINICAL HYPOTHYROIDISM: ICD-10-CM

## 2025-01-29 DIAGNOSIS — E06.3 HASHIMOTO'S THYROIDITIS: ICD-10-CM

## 2025-01-29 DIAGNOSIS — K90.0 CELIAC DISEASE: ICD-10-CM

## 2025-01-29 RX ORDER — LEVOTHYROXINE SODIUM 137 UG/1
137 CAPSULE ORAL
Qty: 90 CAPSULE | Refills: 0 | Status: SHIPPED | OUTPATIENT
Start: 2025-01-29

## 2025-01-30 ENCOUNTER — MYC MEDICAL ADVICE (OUTPATIENT)
Dept: FAMILY MEDICINE | Facility: CLINIC | Age: 25
End: 2025-01-30
Payer: COMMERCIAL

## 2025-02-01 ENCOUNTER — VIRTUAL VISIT (OUTPATIENT)
Dept: URGENT CARE | Facility: CLINIC | Age: 25
End: 2025-02-01
Payer: COMMERCIAL

## 2025-02-01 DIAGNOSIS — J11.1 INFLUENZA-LIKE ILLNESS: Primary | ICD-10-CM

## 2025-02-01 DIAGNOSIS — Z20.828 EXPOSURE TO INFLUENZA: ICD-10-CM

## 2025-02-01 PROCEDURE — 98005 SYNCH AUDIO-VIDEO EST LOW 20: CPT

## 2025-02-01 RX ORDER — OSELTAMIVIR PHOSPHATE 75 MG/1
75 CAPSULE ORAL 2 TIMES DAILY
Qty: 10 CAPSULE | Refills: 0 | Status: SHIPPED | OUTPATIENT
Start: 2025-02-01 | End: 2025-02-06

## 2025-02-01 NOTE — PROGRESS NOTES
"  Litzy Hatch is a 24 year old female who is being evaluated via a billable video visit.      The patient has been notified of following at the time of scheduling video visit:     \"This video visit will be conducted via a video call between you and your physician/provider. We have found that certain health care needs can be provided without the need for a physical exam.  This service lets us provide the care you need with a video conversation.  If a prescription is necessary we can send it directly to your pharmacy.  If lab work is needed we can place an order for that and you can then stop by our lab to have the test done at a later time.\"   Patient has given consent for video visit?  YES    SUBJECTIVE:  Litzy Hatch is an 24 year old female who presents for not feeling well.  Last night started to have cough and runny nose and throat irritation.  No v/d.   is positive for influenza A.  Has some headache and body aches.  No fevers yet.  Is interested in treating with tamiflu.    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  Patient Active Problem List   Diagnosis    Hashimoto's thyroiditis    Subclinical hypothyroidism    Anti-TPO antibodies present    IgA deficiency (H)    Hyperemesis gravidarum    GBS bacteriuria    BMI 23.0-23.9, adult    Supervision of other normal pregnancy, antepartum    CHRISTINA (generalized anxiety disorder)    Low-lying placenta- resolved as of 36wks    Electron microscopy positive for sapovirus    Impaired glucose in pregnancy, antepartum    Pregnancy    Celiac " disease     Social History     Socioeconomic History    Marital status:      Spouse name: Erik-    Number of children: 1    Highest education level: Bachelor's degree (e.g., BA, AB, BS)   Occupational History    Occupation: ICU RN     Employer: [employer]   Tobacco Use    Smoking status: Never     Passive exposure: Never    Smokeless tobacco: Never   Vaping Use    Vaping status: Never Used   Substance and Sexual Activity    Alcohol use: Not Currently     Comment: 1-2 X per week    Drug use: Never    Sexual activity: Yes     Partners: Male     Birth control/protection: Natural Family Planning     Comment:    Other Topics Concern    Parent/sibling w/ CABG, MI or angioplasty before 65F 55M? 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;  [place] :  for healthcare company    Willem Bal      Social Drivers of Health     Stress: No Stress Concern Present (6/21/2021)    [employer] [place] [employer] - Occupational Stress Questionnaire     Feeling of Stress : Not at all    [facility] from [facility], [facility]    Social Connections   Interpersonal Safety: Low Risk  (4/17/2024)    Interpersonal Safety     Do you feel physically and emotionally safe where you currently live?: Yes     Within the past 12 months, have you been hit, slapped, kicked or otherwise physically hurt by someone?: No     Within the past 12 months, have you been humiliated or emotionally abused in other ways by your partner or ex-partner?: No     Family History   Problem Relation Age of Onset    Allergies Mother         Nickel    Anxiety Disorder Mother     Hyperlipidemia Mother     No Known Problems Father     Attention Deficit Disorder Sister     Allergies Brother         Currently on AIT- noted 5/12/2021    Attention Deficit Disorder Brother     Hypothyroidism Maternal Grandmother     Asthma  Maternal Grandmother     Thyroid Disease Maternal Grandmother         Hashimoto's    Hypertension Maternal Grandmother     Breast Cancer Paternal Grandmother     No Known Problems Paternal Grandfather     Hypothyroidism Maternal Aunt     Breast Cancer Maternal Great-Grandmother     Hypothyroidism Maternal Great-Grandmother     Diabetes No family hx of     Glaucoma No family hx of     Macular Degeneration No family hx of        ALLERGIES:  Gluten meal    Current Outpatient Medications   Medication Sig Dispense Refill    ciclopirox (PENLAC) 8 % external solution Apply to adjacent skin and affected nails daily.  Remove with alcohol every 7 days, then repeat. 6.6 mL 11    Continuous Glucose Sensor (DEXCOM G6 SENSOR) MISC       Continuous Glucose Transmitter (DEXCOM G6 TRANSMITTER) MISC       ferrous sulfate (FEROSUL) 325 (65 Fe) MG tablet Take 1 tablet (325 mg) by mouth daily (with breakfast) 30 tablet 4    levothyroxine (TIROSINT) 137 MCG capsule Take 137 mcg by mouth every morning (before breakfast). Brand name Tirosint only (gluten free) 90 capsule 0    magnesium gluconate (MAGONATE) 500 (27 Mg) MG tablet Take 500 mg by mouth 2 times daily prn      ondansetron (ZOFRAN) 4 MG tablet Take 1 tablet (4 mg) by mouth every 8 hours as needed for nausea. 10 tablet 11    Prenatal Vit-Fe Fumarate-FA (PRENATAL MULTIVITAMIN W/IRON) 27-0.8 MG tablet Take 1 tablet by mouth daily      SUMAtriptan (IMITREX) 25 MG tablet Take 1 tablet (25 mg) by mouth at onset of headache for migraine. May repeat in 2 hours. Max 8 tablets/24 hours. 18 tablet 11    vitamin C (ASCORBIC ACID) 250 MG tablet Take 1 tablet (250 mg) by mouth daily 30 tablet 4    Vitamin D, Cholecalciferol, 25 MCG (1000 UT) CAPS Take 2 tablets by mouth daily       No current facility-administered medications for this visit.         ROS:  ROS is done and is negative for general/constitutional, eye, ENT, Respiratory, cardiovascular, GI, , Skin, musculoskeletal except as noted  elsewhere.  All other review of systems negative except as noted elsewhere.      OBJECTIVE:    No vital signs obtained as is virtual visit    GENERAL: alert and no distress  EYES: Eyes grossly normal to inspection.  No discharge or erythema, or obvious scleral/conjunctival abnormalities.  RESP: No audible wheeze, cough, or visible cyanosis.    SKIN: Visible skin clear. No significant rash, abnormal pigmentation or lesions.  NEURO: Cranial nerves grossly intact.  Mentation and speech appropriate for age.  PSYCH: Appropriate affect, tone, and pace of words           ASSESSMENT/PLAN:    ASSESSMENT / PLAN:  (J11.1) Influenza-like illness  (primary encounter diagnosis)  Comment: sxs started less than 48 hours ago and has close exposure to influenza A.  Discussed treatment options and pt would like to take tamiflu  Plan: oseltamivir (TAMIFLU) 75 MG capsule        Reviewed medication instructions and side effects. Follow up if experiences side effects.. I reviewed supportive care, otc meds to use if needed, expected course, and signs of concern.  Follow up as needed or if does not improve within 5 day(s) or if worsens in any way.  Reviewed red flag symptoms and is to go to the ER if experiences any of these.    (Z20.828) Exposure to influenza  Comment:   Plan: has sxs, so treat as above.          See [facility] for orders, medications, letters, patient instructions    Madelaine Cross MD  2/1/2025, 7:26 AM    Video-Visit Details    Video Start Time:  8:50    Type of service:  Video Visit    Video End Time: 9:05    Originating Location (pt. Location): Home    Distant Location (provider location):  [facility]     Platform used for Video Visit: Bryan  Done with son's visit.

## 2025-02-12 ENCOUNTER — OFFICE VISIT (OUTPATIENT)
Dept: FAMILY MEDICINE | Facility: CLINIC | Age: 25
End: 2025-02-12
Payer: COMMERCIAL

## 2025-02-12 VITALS
OXYGEN SATURATION: 98 % | BODY MASS INDEX: 22.82 KG/M2 | DIASTOLIC BLOOD PRESSURE: 78 MMHG | RESPIRATION RATE: 16 BRPM | SYSTOLIC BLOOD PRESSURE: 108 MMHG | HEIGHT: 69 IN | HEART RATE: 82 BPM | WEIGHT: 154.1 LBS

## 2025-02-12 DIAGNOSIS — E03.8 SUBCLINICAL HYPOTHYROIDISM: ICD-10-CM

## 2025-02-12 DIAGNOSIS — K90.0 CELIAC DISEASE: ICD-10-CM

## 2025-02-12 DIAGNOSIS — D80.2 IGA DEFICIENCY (H): ICD-10-CM

## 2025-02-12 DIAGNOSIS — E06.3 HASHIMOTO'S THYROIDITIS: ICD-10-CM

## 2025-02-12 DIAGNOSIS — F41.1 GAD (GENERALIZED ANXIETY DISORDER): ICD-10-CM

## 2025-02-12 DIAGNOSIS — B35.1 FUNGAL INFECTION OF NAIL: Primary | ICD-10-CM

## 2025-02-12 PROCEDURE — G2211 COMPLEX E/M VISIT ADD ON: HCPCS | Performed by: FAMILY MEDICINE

## 2025-02-12 PROCEDURE — 99213 OFFICE O/P EST LOW 20 MIN: CPT | Performed by: FAMILY MEDICINE

## 2025-02-12 NOTE — PROGRESS NOTES
Assessment & Plan     Fungal infection of nail  S/p 6 months trial of Penlac topical treatment. She is currently breastfeeding; she opts not to consider oral medication at this point.  She would be interested to meet with a podiatrist.  Of note the toenail bed looks like it is growing and okay but over the past 6 months to a year it really has not changed in texture or thickness and is still dystrophic in appearance.  She has no preceding trauma that she is aware of.  - Orthopedic  Referral; Future    Celiac disease  IgA deficiency- clinically doing well  Hashimotos/hypothyroidism  Anxiety  Conditions reviewed/stable. We are updating TSH level in a few weeks from the 1/30/25 adjustment (currently taking 125mcg 8 doses per week - 1000mcg weekly total dose)    The longitudinal plan of care for the diagnosis(es)/condition(s) as documented were addressed during this visit. Due to the added complexity in care, I will continue to support Litzy in the subsequent management and with ongoing continuity of care.              Subjective   Litzy is a 24 year old, presenting for the following health issues:  RECHECK      2/12/2025    10:23 AM   Additional Questions   Roomed by Sue SULLIVAN MA   Accompanied by Daughter     History of Present Illness       Hypothyroidism:     Since last visit, patient describes the following symptoms::  Anxiety, Constipation, Dry skin, Fatigue and Hair loss    Reason for visit:  Toenail    She eats 2-3 servings of fruits and vegetables daily.She consumes 0 sweetened beverage(s) daily.She exercises with enough effort to increase her heart rate 30 to 60 minutes per day.  She exercises with enough effort to increase her heart rate 3 or less days per week.   She is taking medications regularly.       As of 130/2025 we had her Tirosint adjusted:     using your 125mcg Tirosint go ahead and take 8 doses per week (ie, 2 tabs on Sundays for example, then 1 tab daily every other day)     That's  "a total weekly dose that is 1000mcg; which is just about 40mcg higher than the other dose we would have sent.     Was having a SIBO flare up and using a sunflower fiber and probiotic which helps.   Her intestinal symptoms are improving and also attributes this to the thyroid dose improing.    Wt Readings from Last 3 Encounters:   02/12/25 69.9 kg (154 lb 1.6 oz)   08/29/24 70.4 kg (155 lb 3.2 oz)   07/18/24 79.4 kg (175 lb 2 oz)                    Objective    /78 (BP Location: Left arm, Patient Position: Sitting, Cuff Size: Adult Regular)   Pulse 82   Resp 16   Ht 1.753 m (5' 9\")   Wt 69.9 kg (154 lb 1.6 oz)   LMP 01/20/2025 (Exact Date)   SpO2 98%   Breastfeeding Yes   BMI 22.76 kg/m    Body mass index is 22.76 kg/m .  Physical Exam   General: Alert, no acute distress.   HEENT: normocephalic conjunctivae are clear. EOMI  Neck: supple   Lungs: Normal effort  Heart: regular rate  Abdomen: soft   Skin: The left great toe is yellowed, thickened and slightly pitted/dystrophic down to the nail bed  Neuro: alert, oriented  Psych: mood good, affect appropriate, good eye contact, insight and judgment intact, normal speech pattern.                Signed Electronically by: La Nena Hightower MD    "

## 2025-02-13 ENCOUNTER — PATIENT OUTREACH (OUTPATIENT)
Dept: CARE COORDINATION | Facility: CLINIC | Age: 25
End: 2025-02-13
Payer: COMMERCIAL

## 2025-02-17 ENCOUNTER — PATIENT OUTREACH (OUTPATIENT)
Dept: CARE COORDINATION | Facility: CLINIC | Age: 25
End: 2025-02-17
Payer: COMMERCIAL

## 2025-03-20 ENCOUNTER — LAB (OUTPATIENT)
Dept: LAB | Facility: CLINIC | Age: 25
End: 2025-03-20
Payer: COMMERCIAL

## 2025-03-20 DIAGNOSIS — R76.8 ANTI-TPO ANTIBODIES PRESENT: ICD-10-CM

## 2025-03-20 DIAGNOSIS — E06.3 HASHIMOTO'S THYROIDITIS: ICD-10-CM

## 2025-03-20 DIAGNOSIS — E03.8 SUBCLINICAL HYPOTHYROIDISM: ICD-10-CM

## 2025-04-14 DIAGNOSIS — K90.0 CELIAC DISEASE: ICD-10-CM

## 2025-04-14 DIAGNOSIS — E06.3 HASHIMOTO'S THYROIDITIS: ICD-10-CM

## 2025-04-14 DIAGNOSIS — R76.8 ANTI-TPO ANTIBODIES PRESENT: ICD-10-CM

## 2025-04-14 DIAGNOSIS — E03.8 SUBCLINICAL HYPOTHYROIDISM: ICD-10-CM

## 2025-04-15 ENCOUNTER — PATIENT OUTREACH (OUTPATIENT)
Dept: CARE COORDINATION | Facility: CLINIC | Age: 25
End: 2025-04-15
Payer: COMMERCIAL

## 2025-04-15 RX ORDER — LEVOTHYROXINE SODIUM 137 UG/1
CAPSULE ORAL
Qty: 90 CAPSULE | Refills: 0 | Status: SHIPPED | OUTPATIENT
Start: 2025-04-15

## 2025-04-21 ENCOUNTER — OFFICE VISIT (OUTPATIENT)
Dept: FAMILY MEDICINE | Facility: CLINIC | Age: 25
End: 2025-04-21
Payer: COMMERCIAL

## 2025-04-21 VITALS
OXYGEN SATURATION: 97 % | WEIGHT: 147.3 LBS | RESPIRATION RATE: 14 BRPM | TEMPERATURE: 97.7 F | HEART RATE: 96 BPM | DIASTOLIC BLOOD PRESSURE: 74 MMHG | BODY MASS INDEX: 22.32 KG/M2 | HEIGHT: 68 IN | SYSTOLIC BLOOD PRESSURE: 113 MMHG

## 2025-04-21 DIAGNOSIS — Z13.9 SCREENING FOR CONDITION: ICD-10-CM

## 2025-04-21 DIAGNOSIS — E06.3 HASHIMOTO'S THYROIDITIS: ICD-10-CM

## 2025-04-21 DIAGNOSIS — Z30.09 FAMILY PLANNING COUNSELING: ICD-10-CM

## 2025-04-21 DIAGNOSIS — E03.8 SUBCLINICAL HYPOTHYROIDISM: Primary | ICD-10-CM

## 2025-04-21 PROCEDURE — 3074F SYST BP LT 130 MM HG: CPT | Performed by: FAMILY MEDICINE

## 2025-04-21 PROCEDURE — 3078F DIAST BP <80 MM HG: CPT | Performed by: FAMILY MEDICINE

## 2025-04-21 PROCEDURE — 99214 OFFICE O/P EST MOD 30 MIN: CPT | Performed by: FAMILY MEDICINE

## 2025-04-21 NOTE — PROGRESS NOTES
Assessment & Plan     Subclinical hypothyroidism  Hashimoto's thyroiditis  Family planning counseling  on Tirosint 137mcg (levothyroxine that is gluten free for  hx celiac disease) and last TSH 1.97. Goal for fertility <2.5.   - recheck in 2 months approx (aiming for pregnancy in the fall)  - using Natural family planning     Screening for condition  Will do future orders  - Hemoglobin A1c; Future  (hx of GDM diet controlled)   - Ferritin; Future (currently taking iron during her period weeks      Follow-up as needed for pregnancy care in near future        Justin Mcghee is a 24 year old, presenting for the following health issues:  Follow Up (Pt is here for a follow up. Not fasting. )      4/21/2025     1:00 PM   Additional Questions   Roomed by zuleyka hawkins cma   Accompanied by baby     History of Present Illness       Hypothyroidism:     Since last visit, patient describes the following symptoms::  None    She eats 4 or more servings of fruits and vegetables daily.She consumes 0 sweetened beverage(s) daily.She exercises with enough effort to increase her heart rate 20 to 29 minutes per day.  She exercises with enough effort to increase her heart rate 4 days per week.   She is taking medications regularly.          Hypothyroidism: overall feeling good at this point- on Tirosint 137mcg (levothyroxine that is gluten free for  hx celiac disease) and last TSH 1.97. Goal for fertility <2.5    She has a lot of feelings about the future for pregnancies- She transitioned her care to me from midwives @ 35 wks approx with her 2nd pregnancy    Has had 5 periods so far (9 months post partum at this point; still breast feeding/nursing/pumping)    Had severe hyperemesis with her prior pregnancies and food aversions/unhealthy eating at that time. 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 ~8 months tx so far       Latest Reference Range & Units 07/03/24 12:33 08/29/24 09:28 12/04/24 13:48 01/22/25 12:05 03/20/25 13:29   TSH 0.30 - 4.20 uIU/mL 2.01 0.34 10.70 (H) 9.16 (H) 1.97   (H): Data is abnormally high                  Objective    /74 (BP Location: Right arm, Patient Position: Sitting, Cuff Size: Adult Regular)   Pulse 96   Temp 97.7  F (36.5  C) (Temporal)   Resp 14   Ht 1.738 m (5' 8.43\")   Wt 66.8 kg (147 lb 4.8 oz)   LMP 03/29/2025   SpO2 97%   Breastfeeding Yes   BMI 22.12 kg/m    Body mass index is 22.12 kg/m .  Physical Exam   General: Alert, no acute distress.   HEENT: normocephalic conjunctivae are clear. EOMI  Neck: supple   Lungs: Normal effort  Heart: regular rate  Abdomen: soft   Skin: clear without rash or lesions  Neuro: alert, oriented  Psych: mood good, affect appropriate, good eye contact, insight and judgment intact, normal speech pattern.              Signed Electronically by: La Nena Hightower MD    "

## 2025-05-08 ENCOUNTER — RESULTS FOLLOW-UP (OUTPATIENT)
Dept: FAMILY MEDICINE | Facility: CLINIC | Age: 25
End: 2025-05-08

## 2025-05-08 ENCOUNTER — LAB (OUTPATIENT)
Dept: LAB | Facility: CLINIC | Age: 25
End: 2025-05-08
Payer: COMMERCIAL

## 2025-05-08 DIAGNOSIS — E06.3 HASHIMOTO'S THYROIDITIS: ICD-10-CM

## 2025-05-08 DIAGNOSIS — E03.8 SUBCLINICAL HYPOTHYROIDISM: ICD-10-CM

## 2025-05-08 DIAGNOSIS — Z13.9 SCREENING FOR CONDITION: ICD-10-CM

## 2025-05-08 DIAGNOSIS — R76.8 ANTI-TPO ANTIBODIES PRESENT: ICD-10-CM

## 2025-05-08 DIAGNOSIS — K90.0 CELIAC DISEASE: ICD-10-CM

## 2025-05-08 LAB
EST. AVERAGE GLUCOSE BLD GHB EST-MCNC: 97 MG/DL
HBA1C MFR BLD: 5 % (ref 0–5.6)

## 2025-05-08 PROCEDURE — 82728 ASSAY OF FERRITIN: CPT

## 2025-05-08 PROCEDURE — 84443 ASSAY THYROID STIM HORMONE: CPT

## 2025-05-08 PROCEDURE — 36415 COLL VENOUS BLD VENIPUNCTURE: CPT

## 2025-05-08 PROCEDURE — 86376 MICROSOMAL ANTIBODY EACH: CPT

## 2025-05-08 PROCEDURE — 83036 HEMOGLOBIN GLYCOSYLATED A1C: CPT

## 2025-05-09 LAB
FERRITIN SERPL-MCNC: 45 NG/ML (ref 6–175)
THYROPEROXIDASE AB SERPL-ACNC: 246 IU/ML
TSH SERPL DL<=0.005 MIU/L-ACNC: 4.54 UIU/ML (ref 0.3–4.2)

## 2025-05-09 RX ORDER — LEVOTHYROXINE SODIUM 13 UG/1
150 CAPSULE ORAL
Qty: 90 CAPSULE | Refills: 1 | Status: SHIPPED | OUTPATIENT
Start: 2025-05-09

## 2025-05-17 ENCOUNTER — HEALTH MAINTENANCE LETTER (OUTPATIENT)
Age: 25
End: 2025-05-17

## 2025-07-21 ENCOUNTER — LAB (OUTPATIENT)
Dept: LAB | Facility: CLINIC | Age: 25
End: 2025-07-21
Payer: COMMERCIAL

## 2025-07-21 DIAGNOSIS — E03.8 SUBCLINICAL HYPOTHYROIDISM: ICD-10-CM

## 2025-07-21 DIAGNOSIS — E06.3 HASHIMOTO'S THYROIDITIS: ICD-10-CM

## 2025-07-21 DIAGNOSIS — R76.8 ANTI-TPO ANTIBODIES PRESENT: ICD-10-CM

## 2025-07-21 PROCEDURE — 86376 MICROSOMAL ANTIBODY EACH: CPT

## 2025-07-21 PROCEDURE — 84443 ASSAY THYROID STIM HORMONE: CPT

## 2025-07-21 PROCEDURE — 36415 COLL VENOUS BLD VENIPUNCTURE: CPT

## 2025-07-22 LAB
THYROPEROXIDASE AB SERPL-ACNC: 175 IU/ML
TSH SERPL DL<=0.005 MIU/L-ACNC: 1.23 UIU/ML (ref 0.3–4.2)

## 2025-07-28 ENCOUNTER — E-VISIT (OUTPATIENT)
Dept: FAMILY MEDICINE | Facility: CLINIC | Age: 25
End: 2025-07-28
Payer: COMMERCIAL

## 2025-07-28 ENCOUNTER — MYC MEDICAL ADVICE (OUTPATIENT)
Dept: FAMILY MEDICINE | Facility: CLINIC | Age: 25
End: 2025-07-28

## 2025-07-28 DIAGNOSIS — R21 RASH: Primary | ICD-10-CM

## 2025-07-28 RX ORDER — TRIAMCINOLONE ACETONIDE 1 MG/G
OINTMENT TOPICAL 2 TIMES DAILY
Qty: 30 G | Refills: 1 | Status: SHIPPED | OUTPATIENT
Start: 2025-07-28

## 2025-09-02 ENCOUNTER — APPOINTMENT (OUTPATIENT)
Dept: URBAN - METROPOLITAN AREA CLINIC 260 | Age: 25
Setting detail: DERMATOLOGY
End: 2025-09-03

## 2025-09-02 VITALS — WEIGHT: 145 LBS | HEIGHT: 68 IN

## 2025-09-02 DIAGNOSIS — L30.9 DERMATITIS, UNSPECIFIED: ICD-10-CM

## 2025-09-02 DIAGNOSIS — L70.0 ACNE VULGARIS: ICD-10-CM

## 2025-09-02 PROCEDURE — OTHER COUNSELING: OTHER

## 2025-09-02 PROCEDURE — OTHER PRESCRIPTION MEDICATION MANAGEMENT: OTHER

## 2025-09-02 PROCEDURE — 99214 OFFICE O/P EST MOD 30 MIN: CPT

## 2025-09-02 PROCEDURE — OTHER MIPS QUALITY: OTHER

## 2025-09-02 PROCEDURE — OTHER PHOTO-DOCUMENTATION: OTHER

## 2025-09-02 PROCEDURE — OTHER PRESCRIPTION: OTHER

## 2025-09-02 RX ORDER — CLINDAMYCIN PHOSPHATE 10 MG/ML
SOLUTION TOPICAL
Qty: 60 | Refills: 3 | Status: ERX | COMMUNITY
Start: 2025-09-02

## 2025-09-02 RX ORDER — TRIAMCINOLONE ACETONIDE 1 MG/G
CREAM TOPICAL
Qty: 80 | Refills: 2 | Status: ERX | COMMUNITY
Start: 2025-09-02

## 2025-09-02 RX ORDER — TRETIONIN 0.5 MG/G
CREAM TOPICAL
Qty: 45 | Refills: 3 | Status: ERX | COMMUNITY
Start: 2025-09-02

## 2025-09-02 RX ORDER — KETOCONAZOLE 20 MG/G
CREAM TOPICAL
Qty: 60 | Refills: 2 | Status: ERX | COMMUNITY
Start: 2025-09-02

## 2025-09-02 ASSESSMENT — LOCATION ZONE DERM
LOCATION ZONE: FACE
LOCATION ZONE: AXILLAE
LOCATION ZONE: TRUNK

## 2025-09-02 ASSESSMENT — LOCATION DETAILED DESCRIPTION DERM
LOCATION DETAILED: LEFT INFERIOR CENTRAL MALAR CHEEK
LOCATION DETAILED: SUPERIOR THORACIC SPINE
LOCATION DETAILED: LEFT AXILLARY VAULT
LOCATION DETAILED: RIGHT AXILLARY VAULT

## 2025-09-02 ASSESSMENT — LOCATION SIMPLE DESCRIPTION DERM
LOCATION SIMPLE: UPPER BACK
LOCATION SIMPLE: RIGHT AXILLARY VAULT
LOCATION SIMPLE: LEFT AXILLARY VAULT
LOCATION SIMPLE: LEFT CHEEK

## (undated) DEVICE — CUSTOM PACK PERI GYN SMA5BPGHEA

## (undated) DEVICE — GLOVE BIOGEL PI ULTRATOUCH G SZ 6.0 42160

## (undated) DEVICE — SOL WATER IRRIG 1000ML BOTTLE 2F7114

## (undated) DEVICE — DRSG TELFA 3X4" 1050

## (undated) DEVICE — GLOVE UNDER INDICATOR PI SZ 6 LF 41660

## (undated) DEVICE — LUBRICANT SURG 2 OZ STRL FLIP TOP 2OZLUB

## (undated) DEVICE — TUBING VACUUM COLLECTION 6FT 23116

## (undated) DEVICE — Device

## (undated) DEVICE — MAT FLOOR SURGICAL 40X38 0702140238

## (undated) DEVICE — SOL NACL 0.9% IRRIG 1000ML BOTTLE 2F7124

## (undated) DEVICE — GOWN LG DISP 9515

## (undated) DEVICE — PREP SCRUB SOL EXIDINE 4% CHG 4OZ 29002-404

## (undated) RX ORDER — PROPOFOL 10 MG/ML
INJECTION, EMULSION INTRAVENOUS
Status: DISPENSED
Start: 2022-03-01

## (undated) RX ORDER — FENTANYL CITRATE 50 UG/ML
INJECTION, SOLUTION INTRAMUSCULAR; INTRAVENOUS
Status: DISPENSED
Start: 2022-03-01

## (undated) RX ORDER — ONDANSETRON 2 MG/ML
INJECTION INTRAMUSCULAR; INTRAVENOUS
Status: DISPENSED
Start: 2022-03-01

## (undated) RX ORDER — DEXAMETHASONE SODIUM PHOSPHATE 10 MG/ML
INJECTION, EMULSION INTRAMUSCULAR; INTRAVENOUS
Status: DISPENSED
Start: 2022-03-01

## (undated) RX ORDER — LIDOCAINE HYDROCHLORIDE 10 MG/ML
INJECTION, SOLUTION EPIDURAL; INFILTRATION; INTRACAUDAL; PERINEURAL
Status: DISPENSED
Start: 2022-03-01